# Patient Record
Sex: MALE | Race: WHITE | NOT HISPANIC OR LATINO | Employment: UNEMPLOYED | URBAN - METROPOLITAN AREA
[De-identification: names, ages, dates, MRNs, and addresses within clinical notes are randomized per-mention and may not be internally consistent; named-entity substitution may affect disease eponyms.]

---

## 2018-07-03 ENCOUNTER — OFFICE VISIT (OUTPATIENT)
Dept: FAMILY MEDICINE CLINIC | Facility: CLINIC | Age: 53
End: 2018-07-03
Payer: COMMERCIAL

## 2018-07-03 VITALS
BODY MASS INDEX: 24.14 KG/M2 | SYSTOLIC BLOOD PRESSURE: 138 MMHG | DIASTOLIC BLOOD PRESSURE: 80 MMHG | HEIGHT: 71 IN | WEIGHT: 172.4 LBS

## 2018-07-03 DIAGNOSIS — Z13.6 SCREENING FOR CARDIOVASCULAR CONDITION: ICD-10-CM

## 2018-07-03 DIAGNOSIS — Z23 NEED FOR VACCINATION: ICD-10-CM

## 2018-07-03 DIAGNOSIS — Z12.5 SCREENING FOR PROSTATE CANCER: ICD-10-CM

## 2018-07-03 DIAGNOSIS — Z13.0 SCREENING FOR DEFICIENCY ANEMIA: ICD-10-CM

## 2018-07-03 DIAGNOSIS — Z00.00 WELL ADULT EXAM: Primary | ICD-10-CM

## 2018-07-03 DIAGNOSIS — Z13.29 THYROID DISORDER SCREEN: ICD-10-CM

## 2018-07-03 DIAGNOSIS — Z13.1 SCREENING FOR DIABETES MELLITUS: ICD-10-CM

## 2018-07-03 DIAGNOSIS — Z12.11 SCREEN FOR COLON CANCER: ICD-10-CM

## 2018-07-03 PROCEDURE — 90715 TDAP VACCINE 7 YRS/> IM: CPT | Performed by: FAMILY MEDICINE

## 2018-07-03 PROCEDURE — 99386 PREV VISIT NEW AGE 40-64: CPT | Performed by: FAMILY MEDICINE

## 2018-07-03 PROCEDURE — 90471 IMMUNIZATION ADMIN: CPT | Performed by: FAMILY MEDICINE

## 2018-07-03 NOTE — PROGRESS NOTES
Assessment/Plan:    No problem-specific Assessment & Plan notes found for this encounter  There are no diagnoses linked to this encounter  Subjective:   Chief Complaint   Patient presents with    Physical Exam            Patient ID: Amber Lemus is a 48 y o  male  Patient is here as a new old patient for PE Patient has insurance now He has not been seen since 2006 Patient has history of hyperlipidemia Patient had at that time some chest pain and had stress testing done that was normal His last LDL was 195 ith HDL of 45 but that was in 2005 Patient is not no meds Patient ahs had not adacel in many years He works for an [employer]        The following portions of the patient's history were reviewed and updated as appropriate: allergies, current medications, past social history and problem list     Review of Systems   Constitutional: Negative for activity change, fever and unexpected weight change  HENT: Negative for congestion, ear discharge, ear pain, postnasal drip, rhinorrhea, sinus pain and sinus pressure  Eyes: Positive for visual disturbance  Negative for photophobia, pain, discharge, redness and itching  Respiratory: Negative for chest tightness, shortness of breath and wheezing  Cardiovascular: Negative for chest pain, palpitations and leg swelling  Gastrointestinal: Positive for blood in stool  Negative for abdominal pain, constipation, diarrhea, nausea, rectal pain and vomiting  Hemorrhoids   Genitourinary: Negative for difficulty urinating, dysuria, frequency, hematuria and testicular pain  Musculoskeletal: Positive for arthralgias  Skin: Negative for rash  Neurological: Negative for dizziness, tremors, light-headedness, numbness and headaches  Psychiatric/Behavioral: Negative for decreased concentration, dysphoric mood and sleep disturbance  The patient is not nervous/anxious            Objective:      /80   Ht 5' 11" (1 803 m)   Wt 78 2 kg (172 lb 6 4 oz) BMI 24 04 kg/m²          Physical Exam   Constitutional: He is oriented to person, place, and time  He appears well-developed and well-nourished  HENT:   Head: Normocephalic and atraumatic  Right Ear: Hearing, tympanic membrane and external ear normal    Left Ear: Hearing, tympanic membrane and external ear normal    Eyes: Conjunctivae and EOM are normal  Pupils are equal, round, and reactive to light  Neck: Normal range of motion  Neck supple  No thyromegaly present  Cardiovascular: Normal rate and normal heart sounds  Pulmonary/Chest: Effort normal and breath sounds normal  He has no wheezes  He has no rales  Abdominal: Soft  Bowel sounds are normal  He exhibits no distension  There is no tenderness  Musculoskeletal: He exhibits no edema or tenderness  Lymphadenopathy:     He has no cervical adenopathy  Neurological: He is alert and oriented to person, place, and time  No cranial nerve deficit  Coordination normal    Skin: Skin is warm and dry  No rash noted  Psychiatric: He has a normal mood and affect   His behavior is normal  Judgment and thought content normal

## 2018-07-03 NOTE — PATIENT INSTRUCTIONS
Wellness Visit for Adults   WHAT YOU NEED TO KNOW:   What is a wellness visit? A wellness visit is when you see your healthcare provider to get screened for health problems  You can also get advice on how to stay healthy  Write down your questions so you remember to ask them  Ask your healthcare provider how often you should have a wellness visit  What happens at a wellness visit? Your healthcare provider will ask about your health, and your family history of health problems  This includes high blood pressure, heart disease, and cancer  He or she will ask if you have symptoms that concern you, if you smoke, and about your mood  You may also be asked about your intake of medicines, supplements, food, and alcohol  Any of the following may be done:  · Your weight  will be checked  Your height may also be checked so your body mass index (BMI) can be calculated  Your BMI shows if you are at a healthy weight  · Your blood pressure  and heart rate will be checked  Your temperature may also be checked  · Blood and urine tests  may be done  Blood tests may be done to check your cholesterol levels  Abnormal cholesterol levels increase your risk for heart disease and stroke  You may also need a blood or urine test to check for diabetes if you are at increased risk  Urine tests may be done to look for signs of an infection or kidney disease  · A physical exam  includes checking your heartbeat and lungs with a stethoscope  Your healthcare provider may also check your skin to look for sun damage  · Screening tests  may be recommended  A screening test is done to check for diseases that may not cause symptoms  The screening tests you may need depend on your age, gender, family history, and lifestyle habits  For example, colorectal screening may be recommended if you are 48years old or older  What screening tests do I need if I am a woman? · A Pap smear  is used to screen for cervical cancer   Pap smears are usually done every 3 to 5 years depending on your age  You may need them more often if you have had abnormal Pap smear test results in the past  Ask your healthcare provider how often you should have a Pap smear  · A mammogram  is an x-ray of your breasts to screen for breast cancer  Experts recommend mammograms every 2 years starting at age 48 years  You may need a mammogram at age 52 years or younger if you have an increased risk for breast cancer  Talk to your healthcare provider about when you should start having mammograms and how often you need them  What vaccines might I need? · Get an influenza vaccine  every year  The influenza vaccine protects you from the flu  Several types of viruses cause the flu  The viruses change over time, so new vaccines are made each year  · Get a tetanus-diphtheria (Td) booster vaccine  every 10 years  This vaccine protects you against tetanus and diphtheria  Tetanus is a severe infection that may cause painful muscle spasms and lockjaw  Diphtheria is a severe bacterial infection that causes a thick covering in the back of your mouth and throat  · Get a human papillomavirus (HPV) vaccine  if you are female and aged 23 to 32 or male 23 to 24 and never received it  This vaccine protects you from HPV infection  HPV is the most common infection spread by sexual contact  HPV may also cause vaginal, penile, and anal cancers  · Get a pneumococcal vaccine  if you are aged 72 years or older  The pneumococcal vaccine is an injection given to protect you from pneumococcal disease  Pneumococcal disease is an infection caused by pneumococcal bacteria  The infection may cause pneumonia, meningitis, or an ear infection  · Get a shingles vaccine  if you are aged 61 or older, even if you have had shingles before  The shingles vaccine is an injection to protect you from the varicella-zoster virus  This is the same virus that causes chickenpox   Shingles is a painful rash that develops in people who had chickenpox or have been exposed to the virus  How can I eat healthy? My Plate is a model for planning healthy meals  It shows the types and amounts of foods that should go on your plate  Fruits and vegetables make up about half of your plate, and grains and protein make up the other half  A serving of dairy is included on the side of your plate  The amount of calories and serving sizes you need depends on your age, gender, weight, and height  Examples of healthy foods are listed below:  · Eat a variety of vegetables  such as dark green, red, and orange vegetables  You can also include canned vegetables low in sodium (salt) and frozen vegetables without added butter or sauces  · Eat a variety of fresh fruits , canned fruit in 100% juice, frozen fruit, and dried fruit  · Include whole grains  At least half of the grains you eat should be whole grains  Examples include whole-wheat bread, wheat pasta, brown rice, and whole-grain cereals such as oatmeal     · Eat a variety of protein foods such as seafood (fish and shellfish), lean meat, and poultry without skin (turkey and chicken)  Examples of lean meats include pork leg, shoulder, or tenderloin, and beef round, sirloin, tenderloin, and extra lean ground beef  Other protein foods include eggs and egg substitutes, beans, peas, soy products, nuts, and seeds  · Choose low-fat dairy products such as skim or 1% milk or low-fat yogurt, cheese, and cottage cheese  · Limit unhealthy fats  such as butter, hard margarine, and shortening  How much exercise do I need? Exercise at least 30 minutes per day on most days of the week  Some examples of exercise include walking, biking, dancing, and swimming  You can also fit in more physical activity by taking the stairs instead of the elevator or parking farther away from stores  Include muscle strengthening activities 2 days each week  Regular exercise provides many health benefits  It helps you manage your weight, and decreases your risk for type 2 diabetes, heart disease, stroke, and high blood pressure  Exercise can also help improve your mood  Ask your healthcare provider about the best exercise plan for you  What are some general health and safety guidelines I should follow? · Do not smoke  Nicotine and other chemicals in cigarettes and cigars can cause lung damage  Ask your healthcare provider for information if you currently smoke and need help to quit  E-cigarettes or smokeless tobacco still contain nicotine  Talk to your healthcare provider before you use these products  · Limit alcohol  A drink of alcohol is 12 ounces of beer, 5 ounces of wine, or 1½ ounces of liquor  · Lose weight, if needed  Being overweight increases your risk of certain health conditions  These include heart disease, high blood pressure, type 2 diabetes, and certain types of cancer  · Protect your skin  Do not sunbathe or use tanning beds  Use sunscreen with a SPF 15 or higher  Apply sunscreen at least 15 minutes before you go outside  Reapply sunscreen every 2 hours  Wear protective clothing, hats, and sunglasses when you are outside  · Drive safely  Always wear your seatbelt  Make sure everyone in your car wears a seatbelt  A seatbelt can save your life if you are in an accident  Do not use your cell phone when you are driving  This could distract you and cause an accident  Pull over if you need to make a call or send a text message  · Practice safe sex  Use latex condoms if are sexually active and have more than one partner  Your healthcare provider may recommend screening tests for sexually transmitted infections (STIs)  · Wear helmets, lifejackets, and protective gear  Always wear a helmet when you ride a bike or motorcycle, go skiing, or play sports that could cause a head injury  Wear protective equipment when you play sports   Wear a lifejacket when you are on a boat or doing water sports  CARE AGREEMENT:   You have the right to help plan your care  Learn about your health condition and how it may be treated  Discuss treatment options with your caregivers to decide what care you want to receive  You always have the right to refuse treatment  The above information is an  only  It is not intended as medical advice for individual conditions or treatments  Talk to your doctor, nurse or pharmacist before following any medical regimen to see if it is safe and effective for you  © 2017 [employer]  Information is for End User's use only and may not be sold, redistributed or otherwise used for commercial purposes  All illustrations and images included in CareNotes® are the copyrighted property of [employer]  or [employer]  Diphtheria/Acellular Pertussis/Tetanus Booster Vaccine (Tdap) (By injection)   Pertussis Vaccine, Acellular (per-TUS-iss VAX-een, d-QWAX-vsw-lar), Reduced Diphtheria Toxoid (ree-DOOST dif-THEER-ee-a TOX-oyd), Tetanus Toxoid (TET-a-nus TOX-oyd)  Protects against infections caused by tetanus (lockjaw), diphtheria, or pertussis (whooping cough)  This is a booster vaccine  Brand Name(s): Adacel, Boostrix   There may be other brand names for this medicine  When This Medicine Should Not Be Used: You should not receive this vaccine if you have had an allergic reaction to the separate or combined tetanus, diphtheria, or pertussis vaccine  You should not receive this vaccine if you have had seizures, mental changes, or any other serious reaction within 7 days after you received a pertussis vaccine  How to Use This Medicine:   Injectable  · A nurse or other health provider will give you this medicine  · Your doctor will prescribe your exact dose and tell you how often it should be given  This medicine is given as a shot into one of your muscles    · You may receive other vaccines at the same time as this one, but in a different body area  You should receive patient instructions for all of the vaccines  Talk to your doctor or nurse if you have questions  · Read and follow the patient instructions that come with this medicine  Talk to your doctor or pharmacist if you have any questions  Drugs and Foods to Avoid:   Ask your doctor or pharmacist before using any other medicine, including over-the-counter medicines, vitamins, and herbal products  · Make sure the doctor knows if you are receiving a treatment or medicine that weakens your immune system  This includes radiation treatment, steroid medicines (such as dexamethasone, hydrocortisone, methylprednisolone, prednisolone, prednisone, Medrol®), or cancer medicines  Warnings While Using This Medicine:   · Make sure your doctor knows if you are pregnant or breastfeeding or have epilepsy, a weak immune system, or a history of a stroke  Tell your doctor if you are sick or have a fever  · Tell your doctor about any reaction you had after you received a vaccine  This includes fainting, seizures, a fever over 105 degrees F, or severe redness or swelling where the shot was given  Tell your doctor if you have a history of Guillain-Barré syndrome after you received a vaccine with tetanus  · Call your doctor right away if you faint or have vision changes, numbness or tingling in your arms, hands, or feet, or a seizure after you receive this vaccine  · Tell your doctor if you have an allergy to latex  The syringes may contain dry natural latex rubber  · This vaccine will not treat an active infection  If you have a diphtheria, tetanus, or pertussis infection, you will need medicine to treat the infection    Possible Side Effects While Using This Medicine:   Call your doctor right away if you notice any of these side effects:  · Allergic reaction: Itching or hives, swelling in your face or hands, swelling or tingling in your mouth or throat, chest tightness, trouble breathing  · Changes in vision  · Fever over 105 degrees F  · Lightheadedness or fainting  · Numbness, tingling, or burning pain in your hands, arms, legs, or feet  · Seizures  · Sudden numbness or weakness in your arms or legs  · Severe pain, redness, or swelling where the shot was given  If you notice these less serious side effects, talk with your doctor:   · Headache  · Mild pain, redness, or swelling where the shot was given  · Nausea, vomiting, diarrhea, or stomach pain  · Tiredness  If you notice other side effects that you think are caused by this medicine, tell your doctor  Call your doctor for medical advice about side effects  You may report side effects to FDA at 4-334-FDA-5483  © 2017 [employer]  Information is for End User's use only and may not be sold, redistributed or otherwise used for commercial purposes  The above information is an  only  It is not intended as medical advice for individual conditions or treatments  Talk to your doctor, nurse or pharmacist before following any medical regimen to see if it is safe and effective for you

## 2018-07-15 LAB
BASOPHILS # BLD AUTO: 21 CELLS/UL (ref 0–200)
BASOPHILS NFR BLD AUTO: 0.4 %
CHOLEST SERPL-MCNC: 252 MG/DL
CHOLEST/HDLC SERPL: 5.3 (CALC)
EOSINOPHIL # BLD AUTO: 90 CELLS/UL (ref 15–500)
EOSINOPHIL NFR BLD AUTO: 1.7 %
ERYTHROCYTE [DISTWIDTH] IN BLOOD BY AUTOMATED COUNT: 13.6 % (ref 11–15)
HCT VFR BLD AUTO: 45.6 % (ref 38.5–50)
HDLC SERPL-MCNC: 48 MG/DL
HGB BLD-MCNC: 15.4 G/DL (ref 13.2–17.1)
LDLC SERPL CALC-MCNC: 177 MG/DL (CALC)
LYMPHOCYTES # BLD AUTO: 1171 CELLS/UL (ref 850–3900)
LYMPHOCYTES NFR BLD AUTO: 22.1 %
MCH RBC QN AUTO: 30.6 PG (ref 27–33)
MCHC RBC AUTO-ENTMCNC: 33.8 G/DL (ref 32–36)
MCV RBC AUTO: 90.5 FL (ref 80–100)
MONOCYTES # BLD AUTO: 578 CELLS/UL (ref 200–950)
MONOCYTES NFR BLD AUTO: 10.9 %
NEUTROPHILS # BLD AUTO: 3440 CELLS/UL (ref 1500–7800)
NEUTROPHILS NFR BLD AUTO: 64.9 %
NONHDLC SERPL-MCNC: 204 MG/DL (CALC)
PLATELET # BLD AUTO: 266 THOUSAND/UL (ref 140–400)
PMV BLD REES-ECKER: 10.6 FL (ref 7.5–12.5)
PSA SERPL-MCNC: 0.2 NG/ML
RBC # BLD AUTO: 5.04 MILLION/UL (ref 4.2–5.8)
TRIGL SERPL-MCNC: 135 MG/DL
TSH SERPL-ACNC: 1.24 MIU/L (ref 0.4–4.5)
WBC # BLD AUTO: 5.3 THOUSAND/UL (ref 3.8–10.8)

## 2018-07-17 ENCOUNTER — OFFICE VISIT (OUTPATIENT)
Dept: FAMILY MEDICINE CLINIC | Facility: CLINIC | Age: 53
End: 2018-07-17
Payer: COMMERCIAL

## 2018-07-17 VITALS
WEIGHT: 173 LBS | HEIGHT: 71 IN | DIASTOLIC BLOOD PRESSURE: 84 MMHG | BODY MASS INDEX: 24.22 KG/M2 | SYSTOLIC BLOOD PRESSURE: 138 MMHG

## 2018-07-17 DIAGNOSIS — E78.2 MIXED HYPERLIPIDEMIA: ICD-10-CM

## 2018-07-17 DIAGNOSIS — K42.9 UMBILICAL HERNIA WITHOUT OBSTRUCTION AND WITHOUT GANGRENE: Primary | ICD-10-CM

## 2018-07-17 PROCEDURE — 3008F BODY MASS INDEX DOCD: CPT | Performed by: FAMILY MEDICINE

## 2018-07-17 PROCEDURE — 99214 OFFICE O/P EST MOD 30 MIN: CPT | Performed by: FAMILY MEDICINE

## 2018-07-17 NOTE — PROGRESS NOTES
Assessment/Plan:    Mixed hyperlipidemia  Discussed with patient options for lipids Based on theASCVD plu calculator patient 10 yr risk is 7 3 % With treated lipids we lower his risk to 2 5% Discussed with patient if his risk was >10% I would tell him it is foolish not to try medication Patient was recommended to try omega 3 fatty acid and or fish oil Patient dietary changes recommended increase in fish such as tuna and salmon patient to have repeat labs in 6 months       Diagnoses and all orders for this visit:    Umbilical hernia without obstruction and without gangrene    Mixed hyperlipidemia          Subjective:   Chief Complaint   Patient presents with    Hernia          Patient ID: Sabrina Hollingsworth is a 48 y o  male  Patient is here for possible hernia in the umbilicus Patient states taht he noticed a lump there about 3 days ago It is the size of his index finger It is no painful 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 Patient is not really able to be off work for 4-6 weeks right now and wants to have it examined Patient labs were reveiweed with his today  with HDl 48 Patient is not willing to take statin medication He is asking about "natural things" he can do Patient has family histoyr of hyperlipidemia without any real CAD in the family Patient also lacerated his index finger and wanted me to look at that         The following portions of the patient's history were reviewed and updated as appropriate: allergies, current medications, past social history and problem list     Review of Systems   Constitutional: Negative for activity change, appetite change and unexpected weight change  Gastrointestinal: Negative for abdominal distention, abdominal pain, blood in stool, constipation, diarrhea, nausea and vomiting  Lump in the belly button area   Genitourinary: Negative for difficulty urinating  Skin: Positive for wound  Index finger         Objective:      /84   Ht 5' 11" (1 803 m)   Wt 78 5 kg (173 lb)   BMI 24 13 kg/m²          Physical Exam   Constitutional: He appears well-developed and well-nourished  Abdominal: Soft  Bowel sounds are normal  He exhibits no distension  There is no tenderness  There is no rebound and no guarding  1 5 cm umbilical hernia visibel in the sitting position Patient has no pain in the area   Neurological: He is alert  Skin:   Healing wound ont he right index finger   Psychiatric: He has a normal mood and affect   His behavior is normal  Judgment and thought content normal

## 2018-07-17 NOTE — ASSESSMENT & PLAN NOTE
Discussed with patient options for lipids Based on theASCVD plu calculator patient 10 yr risk is 7 3 % With treated lipids we lower his risk to 2 5% Discussed with patient if his risk was >10% I would tell him it is foolish not to try medication Patient was recommended to try omega 3 fatty acid and or fish oil Patient dietary changes recommended increase in fish such as tuna and salmon patient to have repeat labs in 6 months

## 2018-07-17 NOTE — PATIENT INSTRUCTIONS
Umbilical Hernia   WHAT YOU NEED TO KNOW:   What is an umbilical hernia? An umbilical hernia is a bulge through the abdominal muscles in the area of the navel (belly button)  The hernia may contain tissue from the abdomen, part of an organ (such as the intestine), or fluid  What increases my risk for an umbilical hernia? Umbilical hernias usually happen because of a hole or a weak area in the muscles of the abdominal wall  This can happen at any age  Umbilical hernias happen more often in women than in men  You may be more likely to have a hernia if other family members have them  You may also have an increased risk if you have any of the following:  · Overweight     · Ascites, which is fluid in the abdomen     · A large growth in your abdomen     · Pregnancy now or at any time in the past     · A very long labor when delivering your baby  What are the signs and symptoms of an umbilical hernia? · The most common sign is a bulge or swelling in the area of the navel  You may be able to see the lump, or you may feel it when you gently press on your navel  · The bulge may get bigger when you bend, cough, or strain to have a bowel movement  The bulge may get smaller and hurt less when you lie down  · You may have pain or burning in your abdomen  The pain may get worse when you cough, sneeze, lift, or stand for a long time  · The skin over the bulge may be swollen and red, gray, or blue  How is an umbilical hernia diagnosed? Your healthcare provider will usually find the hernia during an exam  Your healthcare provider may check to see if the hernia can be reduced (gently pushed back into the abdomen)  You may need tests, such as x-rays of the abdomen or an ultrasound  These tests will help healthcare providers decide how to treat your hernia  How is an umbilical hernia treated? · Surgery:  Most adults with umbilical hernias will need surgery to fix their hernias   Always tell your healthcare provider if you have new or worse pain in the area of your hernia  You may need emergency surgery if a loop of intestine becomes trapped in the hernia  · Ibuprofen or acetaminophen:  These medicines decrease pain  They are available without a doctor's order  Ask your healthcare provider which medicine is right for you  Ask how much to take and how often to take it  Follow directions  These medicines can cause stomach bleeding if not taken correctly  Ibuprofen can cause kidney damage  Do not take ibuprofen if you have kidney disease, an ulcer, or allergies to aspirin  Acetaminophen can cause liver damage  Do not drink alcohol if you take acetaminophen  What are the risks of an umbilical hernia? If your hernia is not treated, the following serious problems may happen:  · Incarcerated hernia: This happens when your healthcare provider cannot push your hernia back into your abdomen  The tissue becomes stuck or trapped, which can cause serious problems  Umbilical hernias have a high risk of becoming incarcerated  If this happens, your intestines may become blocked  You may have very bad pain in your abdomen  You may also have nausea or vomiting  · Strangulated hernia:  A loop of intestine in the hernia may become pinched or strangulated  This means that the blood supply to that area of intestine is decreased or cut off  If this happens, you may feel very bad pain in your abdomen  Other signs include nausea, vomiting, or a fever  You may have constipation or blood in your bowel movements  If your intestine becomes blocked, you may not be able to pass gas or have a bowel movement  If the hernia is not treated right away, that part of your intestine may die  This is called gangrene, and it can be life-threatening  When should I contact my healthcare provider? · You have nausea or vomiting  · You cannot gently push your hernia back into your abdomen      · You are constipated or have blood in your bowel movements  · Your hernia is getting bigger  · You have questions or concerns about your condition or care  When should I seek immediate care or call 911? · You have a fever  · Your hernia is stuck outside the abdomen and is painful, swollen, or feels hard  · You completely stop having bowel movements and stop passing gas  · Your abdominal pain is bad or getting worse  CARE AGREEMENT:   You have the right to help plan your care  Learn about your health condition and how it may be treated  Discuss treatment options with your caregivers to decide what care you want to receive  You always have the right to refuse treatment  The above information is an  only  It is not intended as medical advice for individual conditions or treatments  Talk to your doctor, nurse or pharmacist before following any medical regimen to see if it is safe and effective for you  © 2017 [employer] Information is for End User's use only and may not be sold, redistributed or otherwise used for commercial purposes  All illustrations and images included in CareNotes® are the copyrighted property of [employer] M [employer]  or [employer]  Cholesterol and Your Health   WHAT YOU NEED TO KNOW:   What is cholesterol? Cholesterol is a waxy, fat-like substance  Cholesterol is made by your body, but also comes from certain foods you eat  Your body uses cholesterol to make hormones and new cells  Your body also uses cholesterol to protect nerves  Cholesterol comes from foods such as meat and dairy products  Your total cholesterol level is made up by LDL cholesterol, HDL cholesterol, and triglycerides:  · LDL cholesterol  is called bad cholesterol  because it forms plaque in your arteries  As plaque builds up, your arteries become narrow, and less blood flows through  When plaque decreases blood flow to your heart, you may have chest pain   If plaque completely blocks an artery that bring blood to your heart, you may have a heart attack  Plaque can break off and form blood clots  Blood clots may block arteries in your brain and cause a stroke  · HDL cholesterol  is called good cholesterol  because it helps remove LDL cholesterol from your arteries  It does this by attaching to LDL cholesterol and carrying it to your liver  Your liver breaks down LDL cholesterol so your body can get rid of it  High levels of HDL cholesterol can help prevent a heart attack and stroke  Low levels of HDL cholesterol can increase your risk for heart disease, heart attack, and stroke  · Triglycerides  are a type of fat that store energy from foods you eat  High levels of triglycerides also cause plaque buildup  This can increase your risk for a heart attack or stroke  If your triglyceride level is high, your LDL cholesterol level may also be high  How does food affect my cholesterol levels? · Unhealthy fats  increase LDL cholesterol and triglyceride levels in your blood  They are found in foods high in cholesterol, saturated fat, and trans fat:     ¨ Cholesterol  is found in eggs, dairy, and meat  ¨ Saturated fat  is found in butter, cheese, ice cream, whole milk, and coconut oil  Saturated fat is also found in meat, such as sausage, hot dogs, and bologna  ¨ Trans fat  is found in liquid oils and is used in fried and baked foods  Foods that contain trans fats include chips, crackers, muffins, sweet rolls, microwave popcorn, and cookies  · Healthy fats,  also called unsaturated fats, help lower LDL cholesterol and triglyceride levels  Healthy fats include the following:     ¨ Monounsaturated fats  are found in foods such as olive oil, canola oil, avocado, nuts, and olives  ¨ Polyunsaturated fats,  such as omega 3 fats, are found in fish, such as salmon, trout, and tuna  They can also be found in plant foods such as flaxseed, walnuts, and soybeans  What other things affect my cholesterol levels?    · Smoking cigarettes    · Being overweight or obese     · Drinking large amounts of alcohol    · Not enough exercise or no exercise    · Certain genes passed from your parents to you  What do I need to know about having my cholesterol checked? Adults 21to 39years of age should have their cholesterol levels checked every 4 to 6 years  Adults 45 years and older should have their cholesterol checked every 1 to 2 years  You may need your cholesterol checked more often, or at a younger age, if you have risk factors for heart disease  You may also need to have your cholesterol checked more often if you have other health conditions, such as diabetes  Blood tests are used to check cholesterol levels  Blood tests measure your levels of triglycerides, LDL cholesterol, and HDL cholesterol  What should my cholesterol level be? Your cholesterol level goal may depend on your risk for heart disease  It may also depend on your age and other health conditions  Ask your healthcare provider if the following goals are right for you:  · Your total cholesterol level  should be less than 200 mg/dL  This number may also depend on your HDL and LDL cholesterol goals  · Your LDL cholesterol level  should be less than 130 mg/dL  · Your HDL cholesterol level  should be 60 mg/dL or higher  · Your triglyceride level  should be less than 150 mg/dL  How is high cholesterol treated? Treatment for high cholesterol will also decrease your risk of heart disease, heart attack, and stroke  Treatment may include any of the following:  · Medicines  may be given to lower your LDL cholesterol, triglyceride levels, or total cholesterol level   You may need medicines to lower your cholesterol if any of the following is true:     ¨ You have a history of stroke, TIA, unstable angina, or a heart attack    ¨ Your LDL cholesterol level is 190 mg/dL or higher    ¨ You are age 36to 76years of age, have diabetes, and your LDL cholesterol is 70 mg/dL or higher    ¨ You are age 36to 76years of age, have risk factors for heart disease, and your LDL cholesterol is 70 mg/dL or higher    · Lifestyle changes  include changes to your diet, exercise, weight loss, and quitting smoking  It also includes decreasing the amount of alcohol you drink  · Supplements  include fish oil, red yeast rice, and garlic  Fish oil may help lower your triglyceride and LDL cholesterol levels  It may also increase your HDL cholesterol level  Red yeast rice may help decrease your total cholesterol level and LDL cholesterol level  Garlic may help lower your total cholesterol level  Do not take these supplements without talking to your healthcare provider  What changes can I make to the foods I eat to lower my cholesterol levels? A registered dietitian can help you create a healthy eating plan  Read food labels and choose foods low in saturated fat, trans fats, and cholesterol  · Decrease the total amount of fat you eat  Choose lean meats, fat-free or 1% fat milk, and low-fat dairy products, such as yogurt and cheese  Try to limit or avoid red meats  Limit or do not eat fried foods or baked goods such as cookies  · Replace unhealthy fats with healthy fats  Cook foods in olive oil or canola oil  Choose soft margarines that are low in saturated fat and trans fat  Seeds, nuts, and avocados are other examples of healthy fats  · Eat foods with omega-3 fats  Examples include salmon, tuna, mackerel, walnuts, and flaxseed  Eat fish 2 times per week  Children and pregnant women should not eat fish that have high levels of mercury, such as shark, swordfish, and daphne mackerel  · Increase the amount of plant-based foods you eat  Plant-based foods are low in cholesterol and fat  Eating more of these foods may help lower your cholesterol and help you lose weight  Examples of plant-based foods includes fruits, vegetables, legumes, and whole grains   Replace milk that contains dairy with almond, soy, or coconut milk  Eat beans and foods with soy for protein instead of meat  Ask your healthcare provider or dietitian for more information on plant-based foods  · Increase the amount of fiber you eat  High-fiber foods can help lower your LDL cholesterol  You should eat between 20 and 30 grams of fiber each day  Eat at least 5 servings of fruits and vegetables each day  Other examples of high-fiber foods include whole-grain or whole-wheat breads, pastas, or cereals, and brown rice  Eat 3 ounces of whole-grain foods each day  Increase fiber slowly  You may have abdominal discomfort, bloating, and gas if you add fiber to your diet too quickly  What lifestyle changes can I make to lower my cholesterol levels? · Maintain a healthy weight  Ask your healthcare provider how much you should weigh  Ask him or her to help you create a weight loss plan if you are overweight  Weight loss can decrease your total cholesterol and triglyceride levels  · Exercise regularly  Exercise can help lower your total cholesterol level and maintain a healthy weight  Exercise can also help increase your HDL cholesterol level  Work with your healthcare provider to create an exercise program that is right for you  Get at least 30 minutes of moderate exercise most days of the week  Examples of exercise include brisk walking, swimming, or biking  · Do not smoke  Nicotine and other chemicals in cigarettes and cigars can damage your lungs, heart, and blood vessels  They can also raise your triglyceride levels  Ask your healthcare provider for information if you currently smoke and need help to quit  E-cigarettes or smokeless tobacco still contain nicotine  Talk to your healthcare provider before you use these products  · Limit or do not drink alcohol  Alcohol can increase your triglyceride levels  Ask your healthcare provider if it is safe for you to drink alcohol   Also ask how much is safe for you to drink each day   CARE AGREEMENT:   You have the right to help plan your care  Discuss treatment options with your caregivers to decide what care you want to receive  You always have the right to refuse treatment  The above information is an  only  It is not intended as medical advice for individual conditions or treatments  Talk to your doctor, nurse or pharmacist before following any medical regimen to see if it is safe and effective for you  © 2017 [employer]  Information is for End User's use only and may not be sold, redistributed or otherwise used for commercial purposes  All illustrations and images included in CareNotes® are the copyrighted property of [employer] A M , Inc  or [employer]

## 2018-08-31 ENCOUNTER — TELEPHONE (OUTPATIENT)
Dept: GASTROENTEROLOGY | Facility: MEDICAL CENTER | Age: 53
End: 2018-08-31

## 2018-08-31 ENCOUNTER — OFFICE VISIT (OUTPATIENT)
Dept: GASTROENTEROLOGY | Facility: MEDICAL CENTER | Age: 53
End: 2018-08-31
Payer: COMMERCIAL

## 2018-08-31 VITALS
SYSTOLIC BLOOD PRESSURE: 124 MMHG | BODY MASS INDEX: 23.88 KG/M2 | WEIGHT: 171.2 LBS | TEMPERATURE: 98.7 F | DIASTOLIC BLOOD PRESSURE: 82 MMHG | HEART RATE: 75 BPM

## 2018-08-31 DIAGNOSIS — K64.1 GRADE II HEMORRHOIDS: ICD-10-CM

## 2018-08-31 DIAGNOSIS — Z12.11 SCREEN FOR COLON CANCER: Primary | ICD-10-CM

## 2018-08-31 DIAGNOSIS — R10.13 DYSPEPSIA: ICD-10-CM

## 2018-08-31 PROCEDURE — 99244 OFF/OP CNSLTJ NEW/EST MOD 40: CPT | Performed by: INTERNAL MEDICINE

## 2018-08-31 NOTE — LETTER
August 31, 2018     Bernardo Sears DO  [address]  [id number]    Patient: Debra Mercado   YOB: 1965   Date of Visit: 8/31/2018       Dear Dr Mauricio Shaw:    Thank you for referring Luis Armando Lawson to me for evaluation  Below are my notes for this consultation  If you have questions, please do not hesitate to call me  I look forward to following your patient along with you  Sincerely,        Dinesh Rivas MD        CC: No Recipients  Dinesh Rivas MD  8/31/2018  8:30 AM  Sign at close encounter  [facility] - Outpatient Consultation  Debra Mercado 48 y o  male MRN: [id number]  Encounter: [id number]          ASSESSMENT AND PLAN:      1  Screen for colon cancer- this will be his index colonoscopy  He is average risk for colorectal cancer  - polyethylene glycol (GOLYTELY) 4000 mL solution; Take 4,000 mL by mouth once for 1 dose  Dispense: 4000 mL; Refill: 0  - Case request operating room: COLONOSCOPY    2  Grade II hemorrhoids  3  Dyspepsia  - discuss conservative management and further evaluation during colonoscopy  This would include increasing fiber, fluids  We also discuss starting probiotics due to his symptoms of dyspepsia  We also did stated improving of constipation which is mild may improve overall symptoms of dyspepsia as well   - H  pylori antigen, stool; Future  - Celiac Disease Antibody Profile; Future    ______________________________________________________________________    HPI:      He is a 80-year-old male presents here for his index colonoscopy  He does have history of hemorrhoids and has intermittent bleeding which is hemorrhoidal in nature  No prior colonoscopy  He previously had hemorrhoid surgery 20 years ago now hemorrhoids her more bothersome  Otherwise doing well without any acute issues  Hemoglobin is within normal limits  Review of system he does have history of dyspepsia associated bloating  He does he has lot of breads will rule not to make sure he does not have celiac disease  We also discussed increasing fiber as he is mildly constipated [place] stool chart type 2  REVIEW OF SYSTEMS:    CONSTITUTIONAL: Denies any fever, chills, rigors, and weight loss  HEENT: No earache or tinnitus  Denies hearing loss or visual disturbances  CARDIOVASCULAR: No chest pain or palpitations  RESPIRATORY: Denies any cough, hemoptysis, shortness of breath or dyspnea on exertion  GASTROINTESTINAL: As noted in the History of Present Illness  GENITOURINARY: No problems with urination  Denies any hematuria or dysuria  NEUROLOGIC: No dizziness or vertigo, denies headaches  MUSCULOSKELETAL: Denies any muscle or joint pain  SKIN: Denies skin rashes or itching  ENDOCRINE: Denies excessive thirst  Denies intolerance to heat or cold  PSYCHOSOCIAL: Denies depression or anxiety  Denies any recent memory loss  Historical Information   Past Medical History:   Diagnosis Date    Hyperlipidemia      History reviewed  No pertinent surgical history  Social History   History   Alcohol Use    Yes     Comment: occas      History   Drug Use No     History   Smoking Status    Never Smoker   Smokeless Tobacco    Never Used     Family History   Problem Relation Age of Onset    Hypertension Mother     Hyperlipidemia Mother     Hyperlipidemia Father     No Known Problems Brother        Meds/Allergies       Current Outpatient Prescriptions:     polyethylene glycol (GOLYTELY) 4000 mL solution    No Known Allergies        Objective     Blood pressure 124/82, pulse 75, temperature 98 7 °F (37 1 °C), temperature source Tympanic, weight 77 7 kg (171 lb 3 2 oz)  Body mass index is 23 88 kg/m²          PHYSICAL EXAM:      General Appearance:   Alert, cooperative, no distress   HEENT:   Normocephalic, atraumatic, anicteric      Neck:  Supple, symmetrical, trachea midline   Lungs:   Clear to auscultation bilaterally; no rales, rhonchi or wheezing; respirations unlabored    Heart[de-identified]   Regular rate and rhythm; no murmur, rub, or gallop  Abdomen:   Soft, non-tender, non-distended; normal bowel sounds; no masses, no organomegaly    Genitalia:   Deferred    Rectal:   Deferred    Extremities:  No cyanosis, clubbing or edema    Pulses:  2+ and symmetric    Skin:  No jaundice, rashes, or lesions    Lymph nodes:  No palpable cervical lymphadenopathy        Lab Results:   No visits with results within 1 Day(s) from this visit  Latest known visit with results is:   Orders Only on 07/14/2018   Component Date Value    Total Cholesterol 07/14/2018 252*    HDL 07/14/2018 48     Triglycerides 07/14/2018 135     LDL Direct 07/14/2018 177*    Chol HDLC Ratio 07/14/2018 5 3*    Non-HDL Cholesterol 07/14/2018 204*    White Blood Cell Count 07/14/2018 5 3     Red Blood Cell Count 07/14/2018 5 04     Hemoglobin 07/14/2018 15 4     HCT 07/14/2018 45 6     MCV 07/14/2018 90 5     MCH 07/14/2018 30 6     MCHC 07/14/2018 33 8     RDW 07/14/2018 13 6     Platelet Count 69/80/1539 266     SL AMB MPV 07/14/2018 10 6     Neutrophils (Absolute) 07/14/2018 3440     Lymphocytes (Absolute) 07/14/2018 1171     Monocytes (Absolute) 07/14/2018 578     Eosinophils (Absolute) 07/14/2018 90     Basophils (Absolute) 07/14/2018 21     Neutrophils 07/14/2018 64 9     Lymphocytes 07/14/2018 22 1     Monocytes 07/14/2018 10 9     Eosinophils 07/14/2018 1 7     Basophils Relative 07/14/2018 0 4     Prostate Specific Antige* 07/14/2018 0 2     SL AMB TSH W/ REFLEX TO * 07/14/2018 1 24          Radiology Results:   No results found

## 2018-08-31 NOTE — PROGRESS NOTES
[facility] - Outpatient Consultation  Dandre Conn 48 y o  male MRN: [id number]  Encounter: [id number]          ASSESSMENT AND PLAN:      1  Screen for colon cancer- this will be his index colonoscopy  He is average risk for colorectal cancer  - polyethylene glycol (GOLYTELY) 4000 mL solution; Take 4,000 mL by mouth once for 1 dose  Dispense: 4000 mL; Refill: 0  - Case request operating room: COLONOSCOPY    2  Grade II hemorrhoids  3  Dyspepsia  - discuss conservative management and further evaluation during colonoscopy  This would include increasing fiber, fluids  We also discuss starting probiotics due to his symptoms of dyspepsia  We also did stated improving of constipation which is mild may improve overall symptoms of dyspepsia as well   - H  pylori antigen, stool; Future  - Celiac Disease Antibody Profile; Future    ______________________________________________________________________    HPI:      He is a 71-year-old male presents here for his index colonoscopy  He does have history of hemorrhoids and has intermittent bleeding which is hemorrhoidal in nature  No prior colonoscopy  He previously had hemorrhoid surgery 20 years ago now hemorrhoids her more bothersome  Otherwise doing well without any acute issues  Hemoglobin is within normal limits  Review of system he does have history of dyspepsia associated bloating  He does he has lot of breads will rule not to make sure he does not have celiac disease  We also discussed increasing fiber as he is mildly constipated Banner stool chart type 2  REVIEW OF SYSTEMS:    CONSTITUTIONAL: Denies any fever, chills, rigors, and weight loss  HEENT: No earache or tinnitus  Denies hearing loss or visual disturbances  CARDIOVASCULAR: No chest pain or palpitations  RESPIRATORY: Denies any cough, hemoptysis, shortness of breath or dyspnea on exertion  GASTROINTESTINAL: As noted in the History of Present Illness  GENITOURINARY: No problems with urination  Denies any hematuria or dysuria  NEUROLOGIC: No dizziness or vertigo, denies headaches  MUSCULOSKELETAL: Denies any muscle or joint pain  SKIN: Denies skin rashes or itching  ENDOCRINE: Denies excessive thirst  Denies intolerance to heat or cold  PSYCHOSOCIAL: Denies depression or anxiety  Denies any recent memory loss  Historical Information   Past Medical History:   Diagnosis Date    Hyperlipidemia      History reviewed  No pertinent surgical history  Social History   History   Alcohol Use    Yes     Comment: occas      History   Drug Use No     History   Smoking Status    Never Smoker   Smokeless Tobacco    Never Used     Family History   Problem Relation Age of Onset    Hypertension Mother     Hyperlipidemia Mother     Hyperlipidemia Father     No Known Problems Brother        Meds/Allergies       Current Outpatient Prescriptions:     polyethylene glycol (GOLYTELY) 4000 mL solution    No Known Allergies        Objective     Blood pressure 124/82, pulse 75, temperature 98 7 °F (37 1 °C), temperature source Tympanic, weight 77 7 kg (171 lb 3 2 oz)  Body mass index is 23 88 kg/m²  PHYSICAL EXAM:      General Appearance:   Alert, cooperative, no distress   HEENT:   Normocephalic, atraumatic, anicteric      Neck:  Supple, symmetrical, trachea midline   Lungs:   Clear to auscultation bilaterally; no rales, rhonchi or wheezing; respirations unlabored    Heart[de-identified]   Regular rate and rhythm; no murmur, rub, or gallop  Abdomen:   Soft, non-tender, non-distended; normal bowel sounds; no masses, no organomegaly    Genitalia:   Deferred    Rectal:   Deferred    Extremities:  No cyanosis, clubbing or edema    Pulses:  2+ and symmetric    Skin:  No jaundice, rashes, or lesions    Lymph nodes:  No palpable cervical lymphadenopathy        Lab Results:   No visits with results within 1 Day(s) from this visit     Latest known visit with results is:   Orders Only on 07/14/2018   Component Date Value    Total Cholesterol 07/14/2018 252*    HDL 07/14/2018 48     Triglycerides 07/14/2018 135     LDL Direct 07/14/2018 177*    Chol HDLC Ratio 07/14/2018 5 3*    Non-HDL Cholesterol 07/14/2018 204*    White Blood Cell Count 07/14/2018 5 3     Red Blood Cell Count 07/14/2018 5 04     Hemoglobin 07/14/2018 15 4     HCT 07/14/2018 45 6     MCV 07/14/2018 90 5     MCH 07/14/2018 30 6     MCHC 07/14/2018 33 8     RDW 07/14/2018 13 6     Platelet Count 10/27/0535 266     SL AMB MPV 07/14/2018 10 6     Neutrophils (Absolute) 07/14/2018 3440     Lymphocytes (Absolute) 07/14/2018 1171     Monocytes (Absolute) 07/14/2018 578     Eosinophils (Absolute) 07/14/2018 90     Basophils (Absolute) 07/14/2018 21     Neutrophils 07/14/2018 64 9     Lymphocytes 07/14/2018 22 1     Monocytes 07/14/2018 10 9     Eosinophils 07/14/2018 1 7     Basophils Relative 07/14/2018 0 4     Prostate Specific Antige* 07/14/2018 0 2     SL AMB TSH W/ REFLEX TO * 07/14/2018 1 24          Radiology Results:   No results found

## 2018-08-31 NOTE — PATIENT INSTRUCTIONS
The patient was scheduled at [address] for a colonoscopy with Dr Nathaniel Swain/Randy prep was gone over with by the MA  He is aware he will be called the day prior

## 2018-08-31 NOTE — TELEPHONE ENCOUNTER
08/31/18  Screened by: Ngoc Anderson    Referring Provider Dr CAPELLAN [facility]    Pre- Screening: There is no height or weight on file to calculate BMI  Has patient been referred for a routine screening Colonoscopy? Yes  Is the patient between 39-70 years old? Yes    SCHEDULING STAFF   If the patient is between 45yrs-49yrs, please advise patient to confirm benefits/coverage with their insurance company for a routine screening colonoscopy, some insurance carriers will only cover at [phone] or older   If the patient is over 66years old, please schedule an office visit  Do you have any of the following symptoms? Yes    Have you had a coronary or vascular stent within the last year? No    Have you had a heart attack or stroke in the last 6 months? No    Have you had intestinal surgery in the last 3 months? No    Do you have problems with: No    Do you use: No    Have you been hospitalized in the last Month? NO    Have you been diagnosed with a bleeding disorder or anemia? No    Have you had chest pain (angina) or breathing problems  (COPD) in the last 3 months? No    Do you have any difficulty walking up a flight of stairs? No    Have you had Kidney failure or insufficiency? No    Have you had heart valve surgery? No    Are you confined to a wheelchair? No    Do you take any blood thinners? No    Have you been diagnosed with Diabetes or are you taking any   Diabetic medications? No    : If patient answers NO to medical questions, then schedule procedure  If patient answers YES to medical questions, then schedule office appointment  Previous Colonoscopy No  Date and Facility of last colonoscopy?      Comments:

## 2018-09-06 ENCOUNTER — TELEPHONE (OUTPATIENT)
Dept: GASTROENTEROLOGY | Facility: MEDICAL CENTER | Age: 53
End: 2018-09-06

## 2018-09-06 NOTE — TELEPHONE ENCOUNTER
----- Message from Reyes De Leon MD sent at 9/6/2018  2:36 PM EDT -----  Call patient to report normal results

## 2018-10-01 ENCOUNTER — ANESTHESIA EVENT (OUTPATIENT)
Dept: GASTROENTEROLOGY | Facility: MEDICAL CENTER | Age: 53
End: 2018-10-01
Payer: COMMERCIAL

## 2018-10-02 ENCOUNTER — HOSPITAL ENCOUNTER (OUTPATIENT)
Facility: MEDICAL CENTER | Age: 53
Setting detail: OUTPATIENT SURGERY
Discharge: HOME | End: 2018-10-02
Attending: INTERNAL MEDICINE | Admitting: INTERNAL MEDICINE
Payer: COMMERCIAL

## 2018-10-02 ENCOUNTER — ANESTHESIA (OUTPATIENT)
Dept: GASTROENTEROLOGY | Facility: MEDICAL CENTER | Age: 53
End: 2018-10-02
Payer: COMMERCIAL

## 2018-10-02 VITALS
TEMPERATURE: 98.2 F | OXYGEN SATURATION: 94 % | HEIGHT: 71 IN | WEIGHT: 165 LBS | RESPIRATION RATE: 17 BRPM | HEART RATE: 46 BPM | SYSTOLIC BLOOD PRESSURE: 117 MMHG | BODY MASS INDEX: 23.1 KG/M2 | DIASTOLIC BLOOD PRESSURE: 78 MMHG

## 2018-10-02 DIAGNOSIS — Z12.11 SCREEN FOR COLON CANCER: ICD-10-CM

## 2018-10-02 PROCEDURE — 88305 TISSUE EXAM BY PATHOLOGIST: CPT | Performed by: PATHOLOGY

## 2018-10-02 PROCEDURE — 45385 COLONOSCOPY W/LESION REMOVAL: CPT | Performed by: INTERNAL MEDICINE

## 2018-10-02 PROCEDURE — 45380 COLONOSCOPY AND BIOPSY: CPT | Performed by: INTERNAL MEDICINE

## 2018-10-02 RX ORDER — PROPOFOL 10 MG/ML
INJECTION, EMULSION INTRAVENOUS AS NEEDED
Status: DISCONTINUED | OUTPATIENT
Start: 2018-10-02 | End: 2018-10-02 | Stop reason: SURG

## 2018-10-02 RX ORDER — SODIUM CHLORIDE 9 MG/ML
125 INJECTION, SOLUTION INTRAVENOUS CONTINUOUS
Status: DISCONTINUED | OUTPATIENT
Start: 2018-10-02 | End: 2018-10-02 | Stop reason: HOSPADM

## 2018-10-02 RX ADMIN — PROPOFOL 50 MG: 10 INJECTION, EMULSION INTRAVENOUS at 08:23

## 2018-10-02 RX ADMIN — SODIUM CHLORIDE 125 ML/HR: 0.9 INJECTION, SOLUTION INTRAVENOUS at 08:01

## 2018-10-02 RX ADMIN — PROPOFOL 50 MG: 10 INJECTION, EMULSION INTRAVENOUS at 08:33

## 2018-10-02 RX ADMIN — PROPOFOL 50 MG: 10 INJECTION, EMULSION INTRAVENOUS at 08:20

## 2018-10-02 RX ADMIN — PROPOFOL 100 MG: 10 INJECTION, EMULSION INTRAVENOUS at 08:17

## 2018-10-02 RX ADMIN — PROPOFOL 50 MG: 10 INJECTION, EMULSION INTRAVENOUS at 08:38

## 2018-10-02 RX ADMIN — PROPOFOL 50 MG: 10 INJECTION, EMULSION INTRAVENOUS at 08:26

## 2018-10-02 RX ADMIN — PROPOFOL 50 MG: 10 INJECTION, EMULSION INTRAVENOUS at 08:29

## 2018-10-02 NOTE — OP NOTE
OPERATIVE REPORT  PATIENT NAME: Domo Johnson    :  1965  MRN: [id number]  Pt Location: [facility] GI ROOM 01    SURGERY DATE: 10/2/2018    Surgeon(s) and Role:     * Chary Sampson MD - Primary    Preop Diagnosis:  Screen for colon cancer [Z12 11]    Post-Op Diagnosis Codes:     * Screen for colon cancer [Z12 11]    Procedure(s) (LRB):  COLONOSCOPY (N/A)    Specimen(s):  ID Type Source Tests Collected by Time Destination   1 : cecal polyp- coldt snarex1 forcep x1 Tissue Polyp, Colorectal TISSUE EXAM Chary Sampson MD 10/2/2018 2835    2 : sigmoid polyp- hot snare Tissue Polyp, Colorectal TISSUE Savanna Herrmann MD 10/2/2018 1505    3 : sigmoid polyp- hot snare Tissue Polyp, Colorectal TISSUE EXAM Chary Sampson MD 10/2/2018 0840      Colonoscopy Procedure Note    Procedure: Colonoscopy    Sedation: Monitored anesthesia care, check anesthesia records      ASA Class: 2    INDICATIONS:  Screening colonoscopy    POST-OP DIAGNOSIS: See the impression below    Procedure Details     Prior colonoscopy: No prior colonoscopy  Informed consent was obtained for the procedure, including sedation  Risks of perforation, hemorrhage, adverse drug reaction and aspiration were discussed  The patient was placed in the left lateral decubitus position  Based on the pre-procedure assessment, including review of the patient's medical history, medications, allergies, and review of systems, he had been deemed to be an appropriate candidate for conscious sedation; he was therefore sedated with the medications listed below  The patient was monitored continuously with telemetry, pulse oximetry, blood pressure monitoring, and direct observations  A rectal examination was performed  The colonoscope was inserted into the rectum and advanced under direct vision to the cecum, which was identified by the ileocecal valve and appendiceal orifice  The quality of the colonic preparation was excellent    A careful inspection was made as the colonoscope was withdrawn, including a retroflexed view of the rectum; findings and interventions are described below  Findings:  10 mm colonic polyp seen in the cecum by cold snare polypectomy  4 mm colonic polyp seen the cecum by cold biopsy polypectomy  30 mm colonic polyp seen in the sigmoid removed by snare cautery polypectomy  3 clips were placed at the site of polypectomy  20 mm polyp seen in the sigmoid colon removed by snare cautery polypectomy  2 clips were placed at the site of polypectomy           Complications: None; patient tolerated the procedure well  Impression:    Multiple colonic polyps removed  Follow-up biopsy results  Close monitor for post polypectomy    Recommendations:  Repeat colonoscopy in 3 years if polyp is an adenoma        SIGNATURE: Carole Giordano MD  DATE: October 2, 2018  TIME: 8:49 AM

## 2018-10-02 NOTE — DISCHARGE INSTRUCTIONS
Colorectal Polyps   WHAT YOU NEED TO KNOW:   Colorectal polyps are small growths of tissue in the lining of the colon and rectum  Most polyps are hyperplastic polyps and are usually benign (noncancerous)  Certain types of polyps, called adenomatous polyps, may turn into cancer  DISCHARGE INSTRUCTIONS:   Follow up with your healthcare provider or gastroenterologist as directed: You may need to return for more tests, such as another colonoscopy  Write down your questions so you remember to ask them during your visits  Reduce your risk for colorectal polyps:   · Eat a variety of healthy foods:  Healthy foods include fruit, vegetables, whole-grain breads, low-fat dairy products, beans, lean meat, and fish  Ask if you need to be on a special diet  · Maintain a healthy weight:  Ask your healthcare provider if you need to lose weight and how much you need to lose  Ask for help with a weight loss program     · Exercise:  Begin to exercise slowly and do more as you get stronger  Talk with your healthcare provider before you start an exercise program      · Limit alcohol:  Your risk for polyps increases the more you drink  · Do not smoke: If you smoke, it is never too late to quit  Ask for information about how to stop  For support and more information:   · Mukul Deluca ([facility]) [address], [place] [address]  Phone: [phone] - [phone]  Web Address: www digestive  niddk nih gov  Contact your healthcare provider or gastroenterologist if:   · You have a fever  · You have chills, a cough, or feel weak and achy  · You have abdominal pain that does not go away or gets worse after you take medicine  · Your abdomen is swollen  · You are losing weight without trying  · You have questions or concerns about your condition or care  Seek care immediately or call 911 if:   · You have sudden shortness of breath       · You have a fast heart rate, fast breathing, or are too dizzy to stand up  · You have severe abdominal pain  · You see blood in your bowel movement  © 2017 [facility] Information is for End User's use only and may not be sold, redistributed or otherwise used for commercial purposes  All illustrations and images included in CareNotes® are the copyrighted property of A D A M , Inc  or Judson Villalobos  The above information is an  only  It is not intended as medical advice for individual conditions or treatments  Talk to your doctor, nurse or pharmacist before following any medical regimen to see if it is safe and effective for you

## 2018-10-02 NOTE — H&P
History and Physical -  [facility]  Melissa Nolan 48 y o  male MRN: [id number]                  HPI: Melissa Nolan is a 48y o  year old male who presents for screening colonoscopy    REVIEW OF SYSTEMS: Per the HPI, and otherwise unremarkable  Historical Information   Past Medical History:   Diagnosis Date    Hyperlipidemia      Past Surgical History:   Procedure Laterality Date    HEMORRHOID SURGERY      HERNIA REPAIR       Social History   History   Alcohol Use    Yes     Comment: occas      History   Drug Use No     History   Smoking Status    Never Smoker   Smokeless Tobacco    Never Used     Family History   Problem Relation Age of Onset    Hypertension Mother     Hyperlipidemia Mother     Hyperlipidemia Father     No Known Problems Brother        Meds/Allergies     No prescriptions prior to admission  No Known Allergies    Objective     There were no vitals taken for this visit  PHYSICAL EXAM    Gen: NAD  CV: RRR  CHEST: Clear  ABD: soft, NT/ND  EXT: no edema      ASSESSMENT/PLAN:  This is a 48y o  year old male here for screening colonoscopy and he is stable and optimized for his procedure

## 2018-10-02 NOTE — ANESTHESIA POSTPROCEDURE EVALUATION
Post-Op Assessment Note      CV Status:  Stable    Mental Status:  Alert and awake    Hydration Status:  Euvolemic    PONV Controlled:  Controlled    Airway Patency:  Patent    Post Op Vitals Reviewed: Yes          Staff: Anesthesiologist           /78 (10/02/18 0903)    Temp      Pulse (!) 46 (10/02/18 0903)   Resp 17 (10/02/18 0903)    SpO2 94 % (10/02/18 0903)

## 2018-10-02 NOTE — ANESTHESIA PREPROCEDURE EVALUATION
Review of Systems/Medical History  Patient summary reviewed        Cardiovascular  Negative cardio ROS Hyperlipidemia,    Pulmonary  Negative pulmonary ROS        GI/Hepatic  Negative GI/hepatic ROS          Negative  ROS        Endo/Other  Negative endo/other ROS      GYN  Negative gynecology ROS          Hematology  Negative hematology ROS      Musculoskeletal  Negative musculoskeletal ROS        Neurology  Negative neurology ROS      Psychology   Negative psychology ROS              Physical Exam    Airway    Mallampati score: II  TM Distance: >3 FB  Neck ROM: full     Dental   No notable dental hx     Cardiovascular  Comment: Negative ROS, Rhythm: regular, Rate: normal, Cardiovascular exam normal    Pulmonary  Pulmonary exam normal Breath sounds clear to auscultation,     Other Findings        Anesthesia Plan  ASA Score- 2     Anesthesia Type- IV sedation with anesthesia with ASA Monitors  Additional Monitors:   Airway Plan:         Plan Factors-    Induction- intravenous  Postoperative Plan-     Informed Consent- Anesthetic plan and risks discussed with patient

## 2018-10-02 NOTE — DISCHARGE INSTR - AVS FIRST PAGE
OPERATIVE REPORT  PATIENT NAME: Hallie Shell    :  1965  MRN: [id number]  Pt Location: [facility] GI ROOM 01    SURGERY DATE: 10/2/2018    Surgeon(s) and Role:     * Mallorie Bahena MD - Primary    Preop Diagnosis:  Screen for colon cancer [Z12 11]    Post-Op Diagnosis Codes:     * Screen for colon cancer [Z12 11]    Procedure(s) (LRB):  COLONOSCOPY (N/A)    Specimen(s):  ID Type Source Tests Collected by Time Destination  1 : cecal polyp- coldt snarex1 forcep x1 Tissue Polyp, Colorectal TISSUE EXAM Mallorie Bahena MD 10/2/2018 4831   2 : sigmoid polyp- hot snare Tissue Polyp, Colorectal TISSUE Edy Cassidy MD 10/2/2018 6546   3 : sigmoid polyp- hot snare Tissue Polyp, Colorectal TISSUE EXAM Mallorie Bahena MD 10/2/2018 0840     Colonoscopy Procedure Note    Procedure: Colonoscopy    Sedation: Monitored anesthesia care, check anesthesia records      ASA Class: 2    INDICATIONS:  Screening colonoscopy    POST-OP DIAGNOSIS: See the impression below    Procedure Details     Prior colonoscopy: No prior colonoscopy  Informed consent was obtained for the procedure, including sedation  Risks of perforation, hemorrhage, adverse drug reaction and aspiration were discussed  The patient was placed in the left lateral decubitus position  Based on the pre-procedure assessment, including review of the patient's medical history, medications, allergies, and review of systems, he had been deemed to be an appropriate candidate for conscious sedation; he was therefore sedated with the medications listed below  The patient was monitored continuously with telemetry, pulse oximetry, blood pressure monitoring, and direct observations  A rectal examination was performed  The colonoscope was inserted into the rectum and advanced under direct vision to the cecum, which was identified by the ileocecal valve and appendiceal orifice  The quality of the colonic preparation was excellent    A careful inspection was made as the colonoscope was withdrawn, including a retroflexed view of the rectum; findings and interventions are described below  Findings:  10 mm colonic polyp seen in the cecum by cold snare polypectomy  4 mm colonic polyp seen the cecum by cold biopsy polypectomy  30 mm colonic polyp seen in the sigmoid removed by snare cautery polypectomy  3 clips were placed at the site of polypectomy  20 mm polyp seen in the sigmoid colon removed by snare cautery polypectomy  2 clips were placed at the site of polypectomy           Complications: None; patient tolerated the procedure well  Impression:    Multiple colonic polyps removed  Follow-up biopsy results  Close monitor for post polypectomy    Recommendations:  Repeat colonoscopy in 3 years if polyp is an adenoma        SIGNATURE: Delta Hill MD  DATE: October 2, 2018  TIME: 8:49 AM

## 2018-10-04 ENCOUNTER — TELEPHONE (OUTPATIENT)
Dept: GASTROENTEROLOGY | Facility: MEDICAL CENTER | Age: 53
End: 2018-10-04

## 2018-10-04 NOTE — TELEPHONE ENCOUNTER
----- Message from Kb Mosquera MD sent at 10/3/2018  9:09 PM EDT -----  Multiple colonic polyps removed were tubular adenomas repeat in 3 years

## 2021-07-13 ENCOUNTER — TELEPHONE (OUTPATIENT)
Dept: INFECTIOUS DISEASES | Facility: CLINIC | Age: 56
End: 2021-07-13

## 2021-07-13 NOTE — TELEPHONE ENCOUNTER
Called Dr Stone Courser office regarding referral and for them to fax over office notes, labs  They stated will fax over  Will review and contact pt wife Juan José Lucasmaile regarding appt

## 2021-07-14 NOTE — TELEPHONE ENCOUNTER
Called and spoke with pt wife regarding referral and notified her that labs did not indicate lyme, and that pt should go back to PCP and discuss labs as THA came back positive, provider feels should see Rheumatology  Wife was appreciative and will follow up with PCP about seeing Rheumatology

## 2021-12-08 ENCOUNTER — OFFICE VISIT (OUTPATIENT)
Dept: FAMILY MEDICINE CLINIC | Facility: CLINIC | Age: 56
End: 2021-12-08
Payer: COMMERCIAL

## 2021-12-08 VITALS
HEART RATE: 76 BPM | WEIGHT: 170 LBS | SYSTOLIC BLOOD PRESSURE: 158 MMHG | DIASTOLIC BLOOD PRESSURE: 100 MMHG | TEMPERATURE: 98.7 F | BODY MASS INDEX: 25.76 KG/M2 | OXYGEN SATURATION: 98 % | HEIGHT: 68 IN

## 2021-12-08 DIAGNOSIS — Z11.4 SCREENING FOR HIV (HUMAN IMMUNODEFICIENCY VIRUS): ICD-10-CM

## 2021-12-08 DIAGNOSIS — E78.5 HYPERLIPIDEMIA, UNSPECIFIED HYPERLIPIDEMIA TYPE: ICD-10-CM

## 2021-12-08 DIAGNOSIS — Z11.59 ENCOUNTER FOR HEPATITIS C SCREENING TEST FOR LOW RISK PATIENT: ICD-10-CM

## 2021-12-08 DIAGNOSIS — I10 HYPERTENSION, UNSPECIFIED TYPE: Primary | ICD-10-CM

## 2021-12-08 DIAGNOSIS — Z12.5 SCREENING FOR PROSTATE CANCER: ICD-10-CM

## 2021-12-08 DIAGNOSIS — Z13.6 SCREENING FOR CARDIOVASCULAR CONDITION: ICD-10-CM

## 2021-12-08 PROBLEM — Z86.19 HISTORY OF LYME DISEASE: Status: ACTIVE | Noted: 2021-07-08

## 2021-12-08 PROCEDURE — 93000 ELECTROCARDIOGRAM COMPLETE: CPT | Performed by: FAMILY MEDICINE

## 2021-12-08 PROCEDURE — 3725F SCREEN DEPRESSION PERFORMED: CPT | Performed by: FAMILY MEDICINE

## 2021-12-08 PROCEDURE — 99204 OFFICE O/P NEW MOD 45 MIN: CPT | Performed by: FAMILY MEDICINE

## 2021-12-08 RX ORDER — AMLODIPINE BESYLATE 5 MG/1
5 TABLET ORAL DAILY
Qty: 90 TABLET | Refills: 0 | Status: SHIPPED | OUTPATIENT
Start: 2021-12-08 | End: 2021-12-20

## 2021-12-09 ENCOUNTER — APPOINTMENT (OUTPATIENT)
Dept: LAB | Facility: CLINIC | Age: 56
End: 2021-12-09
Payer: COMMERCIAL

## 2021-12-09 DIAGNOSIS — I10 HYPERTENSION, UNSPECIFIED TYPE: ICD-10-CM

## 2021-12-09 DIAGNOSIS — E78.5 HYPERLIPIDEMIA, UNSPECIFIED HYPERLIPIDEMIA TYPE: ICD-10-CM

## 2021-12-09 DIAGNOSIS — Z12.5 SCREENING FOR PROSTATE CANCER: ICD-10-CM

## 2021-12-09 DIAGNOSIS — Z11.59 ENCOUNTER FOR HEPATITIS C SCREENING TEST FOR LOW RISK PATIENT: ICD-10-CM

## 2021-12-09 DIAGNOSIS — Z11.4 SCREENING FOR HIV (HUMAN IMMUNODEFICIENCY VIRUS): ICD-10-CM

## 2021-12-09 LAB
ALBUMIN SERPL BCP-MCNC: 3.8 G/DL (ref 3.5–5)
ALP SERPL-CCNC: 65 U/L (ref 46–116)
ALT SERPL W P-5'-P-CCNC: 44 U/L (ref 12–78)
ANION GAP SERPL CALCULATED.3IONS-SCNC: 4 MMOL/L (ref 4–13)
AST SERPL W P-5'-P-CCNC: 22 U/L (ref 5–45)
BASOPHILS # BLD AUTO: 0.02 THOUSANDS/ΜL (ref 0–0.1)
BASOPHILS NFR BLD AUTO: 0 % (ref 0–1)
BILIRUB SERPL-MCNC: 0.97 MG/DL (ref 0.2–1)
BUN SERPL-MCNC: 18 MG/DL (ref 5–25)
CALCIUM SERPL-MCNC: 9.7 MG/DL (ref 8.3–10.1)
CHLORIDE SERPL-SCNC: 107 MMOL/L (ref 100–108)
CHOLEST SERPL-MCNC: 292 MG/DL
CO2 SERPL-SCNC: 27 MMOL/L (ref 21–32)
CREAT SERPL-MCNC: 1.05 MG/DL (ref 0.6–1.3)
EOSINOPHIL # BLD AUTO: 0.04 THOUSAND/ΜL (ref 0–0.61)
EOSINOPHIL NFR BLD AUTO: 1 % (ref 0–6)
ERYTHROCYTE [DISTWIDTH] IN BLOOD BY AUTOMATED COUNT: 13.5 % (ref 11.6–15.1)
GFR SERPL CREATININE-BSD FRML MDRD: 79 ML/MIN/1.73SQ M
GLUCOSE P FAST SERPL-MCNC: 97 MG/DL (ref 65–99)
HCT VFR BLD AUTO: 52.2 % (ref 36.5–49.3)
HCV AB SER QL: NORMAL
HDLC SERPL-MCNC: 53 MG/DL
HGB BLD-MCNC: 16.4 G/DL (ref 12–17)
IMM GRANULOCYTES # BLD AUTO: 0.02 THOUSAND/UL (ref 0–0.2)
IMM GRANULOCYTES NFR BLD AUTO: 0 % (ref 0–2)
LDLC SERPL CALC-MCNC: 205 MG/DL (ref 0–100)
LYMPHOCYTES # BLD AUTO: 1.1 THOUSANDS/ΜL (ref 0.6–4.47)
LYMPHOCYTES NFR BLD AUTO: 17 % (ref 14–44)
MCH RBC QN AUTO: 30.2 PG (ref 26.8–34.3)
MCHC RBC AUTO-ENTMCNC: 31.4 G/DL (ref 31.4–37.4)
MCV RBC AUTO: 96 FL (ref 82–98)
MONOCYTES # BLD AUTO: 0.57 THOUSAND/ΜL (ref 0.17–1.22)
MONOCYTES NFR BLD AUTO: 9 % (ref 4–12)
NEUTROPHILS # BLD AUTO: 4.56 THOUSANDS/ΜL (ref 1.85–7.62)
NEUTS SEG NFR BLD AUTO: 73 % (ref 43–75)
NRBC BLD AUTO-RTO: 0 /100 WBCS
PLATELET # BLD AUTO: 254 THOUSANDS/UL (ref 149–390)
PMV BLD AUTO: 11 FL (ref 8.9–12.7)
POTASSIUM SERPL-SCNC: 4.1 MMOL/L (ref 3.5–5.3)
PROT SERPL-MCNC: 7.1 G/DL (ref 6.4–8.2)
RBC # BLD AUTO: 5.43 MILLION/UL (ref 3.88–5.62)
SODIUM SERPL-SCNC: 138 MMOL/L (ref 136–145)
TRIGL SERPL-MCNC: 168 MG/DL
TSH SERPL DL<=0.05 MIU/L-ACNC: 1.43 UIU/ML (ref 0.36–3.74)
WBC # BLD AUTO: 6.31 THOUSAND/UL (ref 4.31–10.16)

## 2021-12-09 PROCEDURE — 84443 ASSAY THYROID STIM HORMONE: CPT

## 2021-12-09 PROCEDURE — 80053 COMPREHEN METABOLIC PANEL: CPT

## 2021-12-09 PROCEDURE — 86803 HEPATITIS C AB TEST: CPT

## 2021-12-09 PROCEDURE — 85025 COMPLETE CBC W/AUTO DIFF WBC: CPT

## 2021-12-09 PROCEDURE — 87389 HIV-1 AG W/HIV-1&-2 AB AG IA: CPT

## 2021-12-09 PROCEDURE — 80061 LIPID PANEL: CPT

## 2021-12-09 PROCEDURE — 84154 ASSAY OF PSA FREE: CPT

## 2021-12-09 PROCEDURE — 84153 ASSAY OF PSA TOTAL: CPT

## 2021-12-09 PROCEDURE — 36415 COLL VENOUS BLD VENIPUNCTURE: CPT

## 2021-12-10 LAB
HIV 1+2 AB+HIV1 P24 AG SERPL QL IA: NORMAL
PSA FREE MFR SERPL: 43.3 %
PSA FREE SERPL-MCNC: 0.13 NG/ML
PSA SERPL-MCNC: 0.3 NG/ML (ref 0–4)

## 2021-12-14 ENCOUNTER — TELEPHONE (OUTPATIENT)
Dept: FAMILY MEDICINE CLINIC | Facility: CLINIC | Age: 56
End: 2021-12-14

## 2021-12-20 ENCOUNTER — OFFICE VISIT (OUTPATIENT)
Dept: FAMILY MEDICINE CLINIC | Facility: CLINIC | Age: 56
End: 2021-12-20
Payer: COMMERCIAL

## 2021-12-20 VITALS
HEIGHT: 68 IN | SYSTOLIC BLOOD PRESSURE: 136 MMHG | DIASTOLIC BLOOD PRESSURE: 90 MMHG | BODY MASS INDEX: 27.01 KG/M2 | TEMPERATURE: 98.7 F | OXYGEN SATURATION: 99 % | HEART RATE: 82 BPM | WEIGHT: 178.2 LBS

## 2021-12-20 DIAGNOSIS — M25.50 ARTHRALGIA, UNSPECIFIED JOINT: ICD-10-CM

## 2021-12-20 DIAGNOSIS — I10 HYPERTENSION, UNSPECIFIED TYPE: Primary | ICD-10-CM

## 2021-12-20 DIAGNOSIS — E78.5 HYPERLIPIDEMIA, UNSPECIFIED HYPERLIPIDEMIA TYPE: ICD-10-CM

## 2021-12-20 PROCEDURE — 99214 OFFICE O/P EST MOD 30 MIN: CPT | Performed by: FAMILY MEDICINE

## 2021-12-20 PROCEDURE — 1036F TOBACCO NON-USER: CPT | Performed by: FAMILY MEDICINE

## 2021-12-20 PROCEDURE — 3008F BODY MASS INDEX DOCD: CPT | Performed by: FAMILY MEDICINE

## 2021-12-20 RX ORDER — AMLODIPINE BESYLATE 10 MG/1
10 TABLET ORAL DAILY
Qty: 90 TABLET | Refills: 1 | Status: SHIPPED | OUTPATIENT
Start: 2021-12-20 | End: 2022-07-11

## 2021-12-20 RX ORDER — MELOXICAM 15 MG/1
15 TABLET ORAL DAILY
Qty: 30 TABLET | Refills: 5 | Status: SHIPPED | OUTPATIENT
Start: 2021-12-20 | End: 2022-02-14

## 2021-12-20 RX ORDER — ATORVASTATIN CALCIUM 40 MG/1
40 TABLET, FILM COATED ORAL DAILY
Qty: 90 TABLET | Refills: 1 | Status: SHIPPED | OUTPATIENT
Start: 2021-12-20 | End: 2022-02-14

## 2022-02-14 DIAGNOSIS — E78.5 HYPERLIPIDEMIA, UNSPECIFIED HYPERLIPIDEMIA TYPE: ICD-10-CM

## 2022-02-14 DIAGNOSIS — I10 HYPERTENSION, UNSPECIFIED TYPE: Primary | ICD-10-CM

## 2022-02-14 RX ORDER — VALSARTAN 80 MG/1
80 TABLET ORAL DAILY
Qty: 90 TABLET | Refills: 1 | Status: SHIPPED | OUTPATIENT
Start: 2022-02-14 | End: 2022-08-01 | Stop reason: SDUPTHER

## 2022-02-14 RX ORDER — SIMVASTATIN 20 MG
20 TABLET ORAL
Qty: 90 TABLET | Refills: 1 | Status: SHIPPED | OUTPATIENT
Start: 2022-02-14 | End: 2022-08-01 | Stop reason: SDUPTHER

## 2022-06-07 ENCOUNTER — VBI (OUTPATIENT)
Dept: ADMINISTRATIVE | Facility: OTHER | Age: 57
End: 2022-06-07

## 2022-07-05 LAB
ALBUMIN SERPL-MCNC: 4.7 G/DL (ref 3.6–5.1)
ALBUMIN/GLOB SERPL: 2.5 (CALC) (ref 1–2.5)
ALP SERPL-CCNC: 63 U/L (ref 35–144)
ALT SERPL-CCNC: 25 U/L (ref 9–46)
AST SERPL-CCNC: 22 U/L (ref 10–35)
BILIRUB SERPL-MCNC: 0.6 MG/DL (ref 0.2–1.2)
BUN SERPL-MCNC: 19 MG/DL (ref 7–25)
BUN/CREAT SERPL: NORMAL (CALC) (ref 6–22)
CALCIUM SERPL-MCNC: 10.2 MG/DL (ref 8.6–10.3)
CHLORIDE SERPL-SCNC: 107 MMOL/L (ref 98–110)
CHOLEST SERPL-MCNC: 160 MG/DL
CHOLEST/HDLC SERPL: 2.9 (CALC)
CO2 SERPL-SCNC: 27 MMOL/L (ref 20–32)
CREAT SERPL-MCNC: 0.95 MG/DL (ref 0.7–1.33)
GLOBULIN SER CALC-MCNC: 1.9 G/DL (CALC) (ref 1.9–3.7)
GLUCOSE SERPL-MCNC: 89 MG/DL (ref 65–99)
HDLC SERPL-MCNC: 55 MG/DL
LDLC SERPL CALC-MCNC: 85 MG/DL (CALC)
NONHDLC SERPL-MCNC: 105 MG/DL (CALC)
POTASSIUM SERPL-SCNC: 4 MMOL/L (ref 3.5–5.3)
PROT SERPL-MCNC: 6.6 G/DL (ref 6.1–8.1)
SL AMB EGFR AFRICAN AMERICAN: 103 ML/MIN/1.73M2
SL AMB EGFR NON AFRICAN AMERICAN: 88 ML/MIN/1.73M2
SODIUM SERPL-SCNC: 140 MMOL/L (ref 135–146)
TRIGL SERPL-MCNC: 107 MG/DL

## 2022-07-11 ENCOUNTER — OFFICE VISIT (OUTPATIENT)
Dept: FAMILY MEDICINE CLINIC | Facility: CLINIC | Age: 57
End: 2022-07-11
Payer: COMMERCIAL

## 2022-07-11 VITALS
OXYGEN SATURATION: 98 % | TEMPERATURE: 98.8 F | DIASTOLIC BLOOD PRESSURE: 70 MMHG | HEART RATE: 75 BPM | HEIGHT: 68 IN | BODY MASS INDEX: 25.16 KG/M2 | SYSTOLIC BLOOD PRESSURE: 104 MMHG | WEIGHT: 166 LBS

## 2022-07-11 DIAGNOSIS — G47.00 INSOMNIA, UNSPECIFIED TYPE: ICD-10-CM

## 2022-07-11 DIAGNOSIS — M25.50 ARTHRALGIA, UNSPECIFIED JOINT: ICD-10-CM

## 2022-07-11 DIAGNOSIS — Z12.5 SCREENING FOR PROSTATE CANCER: ICD-10-CM

## 2022-07-11 DIAGNOSIS — Z00.00 ANNUAL PHYSICAL EXAM: Primary | ICD-10-CM

## 2022-07-11 DIAGNOSIS — E78.5 HYPERLIPIDEMIA, UNSPECIFIED HYPERLIPIDEMIA TYPE: ICD-10-CM

## 2022-07-11 DIAGNOSIS — Z86.010 HISTORY OF COLON POLYPS: ICD-10-CM

## 2022-07-11 DIAGNOSIS — I10 HYPERTENSION, UNSPECIFIED TYPE: ICD-10-CM

## 2022-07-11 PROCEDURE — 99396 PREV VISIT EST AGE 40-64: CPT | Performed by: FAMILY MEDICINE

## 2022-07-11 PROCEDURE — 3725F SCREEN DEPRESSION PERFORMED: CPT | Performed by: FAMILY MEDICINE

## 2022-07-11 RX ORDER — AMLODIPINE BESYLATE 5 MG/1
5 TABLET ORAL DAILY
Qty: 90 TABLET | Refills: 3 | Status: SHIPPED | OUTPATIENT
Start: 2022-07-11

## 2022-07-11 RX ORDER — HYDROXYZINE PAMOATE 50 MG/1
50 CAPSULE ORAL
Qty: 30 CAPSULE | Refills: 3 | Status: SHIPPED | OUTPATIENT
Start: 2022-07-11

## 2022-07-11 NOTE — ASSESSMENT & PLAN NOTE
Patient with episodes of symptomatic hypotension and low blood pressure readings at home and today in office  Patient instructed to decrease amlodipine to 5 mg daily  Continue to check BP at home and follow up in 6 months or sooner if he continues to have episodes of symptomatic hypotension

## 2022-07-11 NOTE — PROGRESS NOTES
Patient Name:  Siria Hurtado   :  1965   MRN:  [id number]   CSN:  [id number]     Subjective:   REASON FOR VISIT:    Chief Complaint   Patient presents with    Hypertension    Physical Exam        HISTORY OF PRESENT ILLNESS:     Angelika Tineo is a 62 y o  male who presents today for annual physical      Patient started a new job and it is very physical   He is using his hands a lot and states that his chronic joint pains are becoming more frequent  He gets joint pain in his hands and shoulders  He was taking mobic in the past but was not very effective  He now takes Naproxen as needed once a day  It does provide relief and he tries not to use it daily  He is working at [address] [place] [address] [employer] as a   He works until midnight and tries to get in bed   He has trouble staying asleep  He has taken Lunesta in the past which helped but it has been several years  He is requesting medication for sleep aide  Patient has been checking his BP regularly and has a couple of episodes where his SBP <100  He has felt lightheaded during these episodes  He is compliant with his medications  He has lost weight and is eating healthier  Date of last physical exam:  2018    Preventive:   BMI Counseling: Body mass index is 25 16 kg/m²  The BMI is above normal  Nutrition recommendations include decreasing overall calorie intake and moderation in carbohydrate intake  Exercise recommendations include moderate aerobic physical activity for 150 minutes/week and exercising 3-5 times per week  Diet: small meals throughout the day   Exercise: denies, hiking 1-2 times per month   Colonoscopy (>39years old) :   Overdue this year, every 3 years recommended due to polyps, last one in 2018  Last Dental Visit: Scheduled in august      Sexually active: yes with wife  2 kids  Son is 29years old and works for [employer], daughter is 21years old and working in a lab/interested in [address] [place] [address] Street:   Past Medical History:   Diagnosis Date    Hyperlipidemia     Hypertension         PAST SURGICAL HISTORY:   Past Surgical History:   Procedure Laterality Date    HEMORRHOID SURGERY      HERNIA REPAIR      HI COLONOSCOPY FLX DX W/COLLJ SPEC WHEN PFRMD N/A 10/2/2018    Procedure: COLONOSCOPY;  Surgeon: Carole Giordano MD;  Location: [facility]; Service: Gastroenterology        CURRENT MEDICATIONS:   Previous Medications    SIMVASTATIN (ZOCOR) 20 MG TABLET    Take 1 tablet (20 mg total) by mouth daily at bedtime    VALSARTAN (DIOVAN) 80 MG TABLET    Take 1 tablet (80 mg total) by mouth daily        SOCIAL HISTORY:   Social History     Tobacco Use    Smoking status: Never Smoker    Smokeless tobacco: Never Used   Substance Use Topics    Alcohol use: Yes     Comment: social        DEPRESSION SCREENING:   Depression Screening   PHQ-2/9 Depression Screening    Little interest or pleasure in doing things: 0 - not at all  Feeling down, depressed, or hopeless: 0 - not at all  PHQ-2 Score: 0  PHQ-2 Interpretation: Negative depression screen                                                                   FAMILY HISTORY:   Family History   Problem Relation Age of Onset    Hypertension Mother     Hyperlipidemia Mother     Hyperlipidemia Father     No Known Problems Brother         ALLERGIES:   No Known Allergies     ROS:   Review of Systems   Constitutional: Negative for appetite change, chills, fatigue and fever  HENT: Negative for congestion, ear discharge, ear pain, postnasal drip, rhinorrhea, sinus pressure, sinus pain, sneezing, sore throat and trouble swallowing  Eyes: Negative for pain, discharge, redness, itching and visual disturbance  Respiratory: Negative for apnea, cough, chest tightness, shortness of breath and wheezing  Cardiovascular: Negative for chest pain and leg swelling     Gastrointestinal: Negative for abdominal distention, abdominal pain, blood in stool, constipation, diarrhea, nausea and vomiting  Endocrine: Negative for polyuria  Genitourinary: Negative for decreased urine volume, difficulty urinating, dysuria, flank pain, frequency, hematuria, penile discharge, penile pain, penile swelling, scrotal swelling, testicular pain and urgency  Musculoskeletal: Positive for arthralgias  Negative for back pain, gait problem, joint swelling, myalgias, neck pain and neck stiffness  Skin: Negative for rash  Neurological: Negative for dizziness, weakness, light-headedness, numbness and headaches  Psychiatric/Behavioral: Positive for sleep disturbance  Negative for behavioral problems  The patient is not nervous/anxious  All other systems reviewed and are negative  Objective:   PHYSICAL EXAM:     /70 (BP Location: Left arm, Patient Position: Sitting, Cuff Size: Standard)   Pulse 75   Temp 98 8 °F (37 1 °C) (Tympanic)   Ht 5' 8 11" (1 73 m)   Wt 75 3 kg (166 lb)   SpO2 98%   BMI 25 16 kg/m²    No exam data present   Physical Exam  Vitals and nursing note reviewed  Constitutional:       General: He is not in acute distress  Appearance: Normal appearance  He is well-developed  He is not toxic-appearing  HENT:      Head: Normocephalic and atraumatic  Right Ear: Tympanic membrane, ear canal and external ear normal       Left Ear: Tympanic membrane, ear canal and external ear normal       Nose: Nose normal       Mouth/Throat:      Mouth: Mucous membranes are moist       Pharynx: Oropharynx is clear  No oropharyngeal exudate  Eyes:      Extraocular Movements: Extraocular movements intact  Conjunctiva/sclera: Conjunctivae normal       Pupils: Pupils are equal, round, and reactive to light  Cardiovascular:      Rate and Rhythm: Normal rate and regular rhythm  Heart sounds: Normal heart sounds  No murmur heard  Pulmonary:      Effort: Pulmonary effort is normal  No respiratory distress  Breath sounds: Normal breath sounds  No wheezing     Abdominal: General: Abdomen is flat  Bowel sounds are normal       Palpations: Abdomen is soft  Tenderness: There is no abdominal tenderness  Hernia: A hernia is present  Hernia is present in the umbilical area  Musculoskeletal:         General: Normal range of motion  Cervical back: Normal range of motion and neck supple  Lymphadenopathy:      Cervical: No cervical adenopathy  Skin:     General: Skin is warm and dry  Neurological:      General: No focal deficit present  Mental Status: He is alert and oriented to person, place, and time  Mental status is at baseline  Psychiatric:         Mood and Affect: Mood normal          Behavior: Behavior normal          Thought Content: Thought content normal          Judgment: Judgment normal           Assessment/Plan:   Problem List Items Addressed This Visit        Cardiovascular and Mediastinum    Hypertension     Patient with episodes of symptomatic hypotension and low blood pressure readings at home and today in office  Patient instructed to decrease amlodipine to 5 mg daily  Continue to check BP at home and follow up in 6 months or sooner if he continues to have episodes of symptomatic hypotension  Relevant Medications    amLODIPine (NORVASC) 5 mg tablet    Other Relevant Orders    CBC and differential    Comprehensive metabolic panel    TSH, 3rd generation       Other    Screening for prostate cancer    Relevant Orders    PSA, Total Screen    Hyperlipidemia     Cholesterol is now well controlled since starting Zocor 20 mg  Continue current medication and recheck in 6 months  Relevant Orders    Lipid Panel with Direct LDL reflex    Arthralgia     Chronic arthralgias  Patient is taking Naproxen but was advised not to take daily due to GI upset and kidney function  He was prescribed voltaren gel to apply to affected joints as needed  Follow up if symptoms worsen and consider referral to Ortho              Relevant Medications Diclofenac Sodium (VOLTAREN) 1 %    Insomnia     Insomnia is chronic  Patient has tried different modalities to improve his sleep hygiene  Patient has taken Lunesta in the past which helped his symptoms  Please start medication which was prescribed:  Vistaril 50 mg nightly prn  Side effects of the medication were discussed with the patient, which include daytime drowsiness  Follow up in 1 month for assessment of insomnia and adjustment of medication if needed                 Relevant Medications    hydrOXYzine pamoate (VISTARIL) 50 mg capsule      Other Visit Diagnoses     Annual physical exam    -  Primary    Relevant Orders    CBC and differential    Comprehensive metabolic panel    History of colon polyps        Relevant Orders    Ambulatory referral for colonoscopy          Lul Mcgill DO

## 2022-07-11 NOTE — ASSESSMENT & PLAN NOTE
Chronic arthralgias  Patient is taking Naproxen but was advised not to take daily due to GI upset and kidney function  He was prescribed voltaren gel to apply to affected joints as needed  Follow up if symptoms worsen and consider referral to Ortho

## 2022-07-11 NOTE — ASSESSMENT & PLAN NOTE
Cholesterol is now well controlled since starting Zocor 20 mg  Continue current medication and recheck in 6 months

## 2022-07-11 NOTE — ASSESSMENT & PLAN NOTE
Insomnia is chronic  Patient has tried different modalities to improve his sleep hygiene  Patient has taken Lunesta in the past which helped his symptoms  Please start medication which was prescribed:  Vistaril 50 mg nightly prn  Side effects of the medication were discussed with the patient, which include daytime drowsiness  Follow up in 1 month for assessment of insomnia and adjustment of medication if needed

## 2022-08-01 ENCOUNTER — PATIENT MESSAGE (OUTPATIENT)
Dept: FAMILY MEDICINE CLINIC | Facility: CLINIC | Age: 57
End: 2022-08-01

## 2022-08-01 DIAGNOSIS — E78.5 HYPERLIPIDEMIA, UNSPECIFIED HYPERLIPIDEMIA TYPE: ICD-10-CM

## 2022-08-01 DIAGNOSIS — I10 HYPERTENSION, UNSPECIFIED TYPE: ICD-10-CM

## 2022-08-01 RX ORDER — SIMVASTATIN 20 MG
20 TABLET ORAL
Qty: 90 TABLET | Refills: 1 | Status: SHIPPED | OUTPATIENT
Start: 2022-08-01

## 2022-08-01 RX ORDER — VALSARTAN 80 MG/1
80 TABLET ORAL DAILY
Qty: 90 TABLET | Refills: 1 | Status: SHIPPED | OUTPATIENT
Start: 2022-08-01

## 2023-03-22 ENCOUNTER — TELEPHONE (OUTPATIENT)
Dept: OBGYN CLINIC | Facility: OTHER | Age: 58
End: 2023-03-22

## 2023-03-22 DIAGNOSIS — M19.049 HAND ARTHRITIS: Primary | ICD-10-CM

## 2023-03-22 NOTE — TELEPHONE ENCOUNTER
Patient is being referred to a orthopedics  Please schedule accordingly      [address]   [phone]

## 2023-07-13 DIAGNOSIS — I10 HYPERTENSION, UNSPECIFIED TYPE: ICD-10-CM

## 2023-07-14 ENCOUNTER — TELEPHONE (OUTPATIENT)
Dept: FAMILY MEDICINE CLINIC | Facility: CLINIC | Age: 58
End: 2023-07-14

## 2023-07-14 DIAGNOSIS — Z00.00 ANNUAL PHYSICAL EXAM: ICD-10-CM

## 2023-07-14 DIAGNOSIS — Z12.5 SCREENING FOR PROSTATE CANCER: ICD-10-CM

## 2023-07-14 DIAGNOSIS — I10 HYPERTENSION, UNSPECIFIED TYPE: ICD-10-CM

## 2023-07-14 DIAGNOSIS — Z13.220 SCREENING FOR LIPID DISORDERS: ICD-10-CM

## 2023-07-14 DIAGNOSIS — E78.5 HYPERLIPIDEMIA, UNSPECIFIED HYPERLIPIDEMIA TYPE: Primary | ICD-10-CM

## 2023-07-14 RX ORDER — AMLODIPINE BESYLATE 5 MG/1
5 TABLET ORAL DAILY
Qty: 90 TABLET | Refills: 3 | Status: SHIPPED | OUTPATIENT
Start: 2023-07-14

## 2023-07-14 NOTE — TELEPHONE ENCOUNTER
Pt scheduled physical for 8/7 is asking for Amlodipine to hold him over until then. Also is asking about lab work for the appt.     Please advise [phone]

## 2023-07-14 NOTE — TELEPHONE ENCOUNTER
Medication was refilled and lab orders were placed. Please advise patient to complete fasting labs prior to appt.

## 2023-08-07 ENCOUNTER — OFFICE VISIT (OUTPATIENT)
Dept: FAMILY MEDICINE CLINIC | Facility: CLINIC | Age: 58
End: 2023-08-07
Payer: COMMERCIAL

## 2023-08-07 VITALS
TEMPERATURE: 98.3 F | BODY MASS INDEX: 23.94 KG/M2 | DIASTOLIC BLOOD PRESSURE: 80 MMHG | HEIGHT: 70 IN | OXYGEN SATURATION: 99 % | HEART RATE: 66 BPM | SYSTOLIC BLOOD PRESSURE: 120 MMHG | WEIGHT: 167.2 LBS

## 2023-08-07 DIAGNOSIS — Z00.00 ANNUAL PHYSICAL EXAM: Primary | ICD-10-CM

## 2023-08-07 DIAGNOSIS — E78.5 HYPERLIPIDEMIA, UNSPECIFIED HYPERLIPIDEMIA TYPE: ICD-10-CM

## 2023-08-07 DIAGNOSIS — Z12.11 SCREEN FOR COLON CANCER: ICD-10-CM

## 2023-08-07 DIAGNOSIS — I10 HYPERTENSION, UNSPECIFIED TYPE: ICD-10-CM

## 2023-08-07 PROCEDURE — 99396 PREV VISIT EST AGE 40-64: CPT | Performed by: FAMILY MEDICINE

## 2023-08-07 NOTE — PROGRESS NOTES
Patient Name:  Alta Brizuela   :  1965   MRN:  [id number]   CSN:  [id number]     Subjective:   REASON FOR VISIT:    Chief Complaint   Patient presents with   • Physical Exam        HISTORY OF PRESENT ILLNESS:     Rishi Regalado is a 62 y.o. male who presents today for annual physical.   He is feeling well and is very active. He denies any chest pain, shortness of breath, or insomnia. Preventive: The patient's BMI is Body mass index is 24.34 kg/m². Enbrennan Arana The BMI is in the acceptable range   Diet: small meals throughout the day   Exercise:  hiking 1-2 times per month   Colonoscopy (>39years old) : Overdue, every 3 years recommended due to polyps, last one in 2018  Last Dental Visit: every 6 months, [address]      Sexually active: yes with wife  2 kids  Son is 29years old and works for [employer], daughter is 21years old and working in a lab/interested in [employer]:   Past Medical History:   Diagnosis Date   • Hyperlipidemia    • Hypertension         PAST SURGICAL HISTORY:   Past Surgical History:   Procedure Laterality Date   • HEMORRHOID SURGERY     • HERNIA REPAIR     • MA COLONOSCOPY FLX DX W/COLLJ SPEC WHEN PFRMD N/A 10/2/2018    Procedure: COLONOSCOPY;  Surgeon: Sarwat Marsh MD;  Location: [facility]; Service: Gastroenterology        CURRENT MEDICATIONS:   Previous Medications    AMLODIPINE (NORVASC) 5 MG TABLET    Take 1 tablet (5 mg total) by mouth daily    SIMVASTATIN (ZOCOR) 20 MG TABLET    take one tablet (20mg total) by mouth daily at bedtime.     VALSARTAN (DIOVAN) 80 MG TABLET    Take 1 tablet (80 mg total) by mouth daily        SOCIAL HISTORY:   Social History     Tobacco Use   • Smoking status: Never   • Smokeless tobacco: Never   Substance Use Topics   • Alcohol use: Yes     Comment: 1 or 2 drinks once or twice a month        DEPRESSION SCREENING:   Depression Screening   PHQ-2/9 Depression Screening    Little interest or pleasure in doing things: 0 - not at all  Feeling down, depressed, or hopeless: 0 - not at all  PHQ-2 Score: 0  PHQ-2 Interpretation: Negative depression screen                                                                   FAMILY HISTORY:   Family History   Problem Relation Age of Onset   • Hypertension Mother    • Hyperlipidemia Mother    • Hyperlipidemia Father    • No Known Problems Brother         ALLERGIES:   No Known Allergies     ROS:   Review of Systems   Constitutional: Negative for appetite change, chills, fatigue and fever. HENT: Negative for congestion, ear discharge, ear pain, postnasal drip, rhinorrhea, sinus pressure, sinus pain, sneezing, sore throat and trouble swallowing. Eyes: Negative for pain, discharge, redness, itching and visual disturbance. Respiratory: Negative for apnea, cough, chest tightness, shortness of breath and wheezing. Cardiovascular: Negative for chest pain and leg swelling. Gastrointestinal: Negative for abdominal distention, abdominal pain, blood in stool, constipation, diarrhea, nausea and vomiting. Endocrine: Negative for polyuria. Genitourinary: Negative for decreased urine volume, difficulty urinating, dysuria, flank pain, frequency, hematuria, penile discharge, penile pain, penile swelling, scrotal swelling, testicular pain and urgency. Musculoskeletal: Negative for arthralgias, back pain, gait problem, joint swelling, myalgias, neck pain and neck stiffness. Skin: Negative for rash. Neurological: Negative for dizziness, weakness, light-headedness, numbness and headaches. Psychiatric/Behavioral: Negative for behavioral problems. The patient is not nervous/anxious. All other systems reviewed and are negative.        Objective:   PHYSICAL EXAM:     /80 (BP Location: Left arm, Patient Position: Sitting, Cuff Size: Adult)   Pulse 66   Temp 98.3 °F (36.8 °C)   Ht 5' 9.5" (1.765 m)   Wt 75.8 kg (167 lb 3.2 oz)   SpO2 99%   BMI 24.34 kg/m²    Vision Screening    Right eye Left eye Both eyes   Without correction 20/25 20/25 20/25   With correction         Physical Exam  Vitals and nursing note reviewed. Constitutional:       General: He is not in acute distress. Appearance: Normal appearance. He is well-developed. He is not toxic-appearing. HENT:      Head: Normocephalic and atraumatic. Right Ear: Tympanic membrane, ear canal and external ear normal.      Left Ear: Tympanic membrane, ear canal and external ear normal.      Nose: Nose normal.      Mouth/Throat:      Mouth: Mucous membranes are moist.      Pharynx: Oropharynx is clear. No oropharyngeal exudate. Eyes:      Extraocular Movements: Extraocular movements intact. Conjunctiva/sclera: Conjunctivae normal.      Pupils: Pupils are equal, round, and reactive to light. Cardiovascular:      Rate and Rhythm: Normal rate and regular rhythm. Heart sounds: Normal heart sounds. No murmur heard. Pulmonary:      Effort: Pulmonary effort is normal. No respiratory distress. Breath sounds: Normal breath sounds. No wheezing. Abdominal:      General: Abdomen is flat. Bowel sounds are normal.      Palpations: Abdomen is soft. Tenderness: There is no abdominal tenderness. Musculoskeletal:         General: Normal range of motion. Cervical back: Normal range of motion and neck supple. Lymphadenopathy:      Cervical: No cervical adenopathy. Skin:     General: Skin is warm and dry. Neurological:      General: No focal deficit present. Mental Status: He is alert and oriented to person, place, and time. Mental status is at baseline. Psychiatric:         Mood and Affect: Mood normal.         Behavior: Behavior normal.         Thought Content: Thought content normal.         Judgment: Judgment normal.          Assessment/Plan:   Problem List Items Addressed This Visit        Cardiovascular and Mediastinum    Hypertension     Well controlled on current blood pressure medications. Other    Screen for colon cancer    Relevant Orders    Ambulatory Referral to Gastroenterology    Hyperlipidemia     Well controlled on statin. Other Visit Diagnoses     Annual physical exam    -  Primary        Blood work was reviewed with patient today. Patient Counseling:   · Exercise: Stressed the importance of regular exercise.      150 minutes of cardiovascular exercise encouraged   · Nutrition: Stressed importance of moderation in sodium/caffeine intake, saturated fat and cholesterol, caloric balance, sufficient intake of fresh fruits, vegetables, fiber     Rina Lessen DO

## 2023-08-10 DIAGNOSIS — I10 HYPERTENSION, UNSPECIFIED TYPE: ICD-10-CM

## 2023-08-10 RX ORDER — AMLODIPINE BESYLATE 5 MG/1
TABLET ORAL
Qty: 90 TABLET | Refills: 0 | OUTPATIENT
Start: 2023-08-10

## 2023-08-11 RX ORDER — VALSARTAN 80 MG/1
80 TABLET ORAL DAILY
Qty: 90 TABLET | Refills: 0 | Status: SHIPPED | OUTPATIENT
Start: 2023-08-11

## 2023-09-21 ENCOUNTER — TELEPHONE (OUTPATIENT)
Dept: GASTROENTEROLOGY | Facility: CLINIC | Age: 58
End: 2023-09-21

## 2023-09-21 ENCOUNTER — PREP FOR PROCEDURE (OUTPATIENT)
Dept: GASTROENTEROLOGY | Facility: CLINIC | Age: 58
End: 2023-09-21

## 2023-09-21 ENCOUNTER — OFFICE VISIT (OUTPATIENT)
Dept: FAMILY MEDICINE CLINIC | Facility: CLINIC | Age: 58
End: 2023-09-21
Payer: COMMERCIAL

## 2023-09-21 VITALS
SYSTOLIC BLOOD PRESSURE: 110 MMHG | TEMPERATURE: 98.1 F | WEIGHT: 159 LBS | HEART RATE: 72 BPM | OXYGEN SATURATION: 97 % | DIASTOLIC BLOOD PRESSURE: 78 MMHG | BODY MASS INDEX: 23.14 KG/M2 | RESPIRATION RATE: 18 BRPM

## 2023-09-21 DIAGNOSIS — Z86.010 HISTORY OF COLON POLYPS: Primary | ICD-10-CM

## 2023-09-21 DIAGNOSIS — R31.29 OTHER MICROSCOPIC HEMATURIA: ICD-10-CM

## 2023-09-21 DIAGNOSIS — M79.671 PAIN OF RIGHT HEEL: ICD-10-CM

## 2023-09-21 DIAGNOSIS — R10.9 RIGHT FLANK PAIN: Primary | ICD-10-CM

## 2023-09-21 DIAGNOSIS — L98.9 SKIN LESION OF FOOT: ICD-10-CM

## 2023-09-21 LAB
SL AMB  POCT GLUCOSE, UA: ABNORMAL
SL AMB LEUKOCYTE ESTERASE,UA: ABNORMAL
SL AMB POCT BILIRUBIN,UA: ABNORMAL
SL AMB POCT BLOOD,UA: ABNORMAL
SL AMB POCT CLARITY,UA: CLEAR
SL AMB POCT COLOR,UA: YELLOW
SL AMB POCT KETONES,UA: ABNORMAL
SL AMB POCT NITRITE,UA: ABNORMAL
SL AMB POCT PH,UA: 5.5
SL AMB POCT SPECIFIC GRAVITY,UA: 1.02
SL AMB POCT URINE PROTEIN: ABNORMAL
SL AMB POCT UROBILINOGEN: 0.2

## 2023-09-21 PROCEDURE — 87086 URINE CULTURE/COLONY COUNT: CPT | Performed by: FAMILY MEDICINE

## 2023-09-21 PROCEDURE — 99214 OFFICE O/P EST MOD 30 MIN: CPT | Performed by: FAMILY MEDICINE

## 2023-09-21 PROCEDURE — 81003 URINALYSIS AUTO W/O SCOPE: CPT | Performed by: FAMILY MEDICINE

## 2023-09-21 NOTE — PROGRESS NOTES
Name: Sho Acosta      : 1965      MRN: [id number]  Encounter Provider: Kory Tom DO  Encounter Date: 2023   Encounter department: [address] [place] [address]   Patient is a 62year old female seen for sudden episode of flank pain associated with nausea. His urine in the office did test positive for blood. I have ordered a STAT CT to R/O kidney stone. He also complains of some difficulty with urine stream.  1. Right flank pain  -     POCT urine dip auto non-scope  -     Urine culture; Future  -     CT renal stone study abdomen pelvis wo contrast; Future; Expected date: 2023  -     Urine culture    2. Pain of right heel  -     Ambulatory Referral to Podiatry; Future    3. Skin lesion of foot  -     Ambulatory Referral to Podiatry; Future    4. Other microscopic hematuria  -     Urine culture; Future  -     CT renal stone study abdomen pelvis wo contrast; Future; Expected date: 2023  -     Urine culture       Also discussed heel pain - I have referred him to podiatry. Possible plantar fascitis vs heel spur. He also has a brown skin lesion on the side of his right heel. Subjective      Chief Complaint   Patient presents with   • Back Pain       Patient had an attack while eating a sandwich - right flank pain - got sweaty. Went away and then about three hours later had similar episode. No history of kidney stones. Is having urinary frequency. Stream seems weaker. Feels like he has lost weight in the past week - decreased appetite. Bowels are normal. No vomiting    Also with pain in right foot. Pain in heel. Back Pain      Review of Systems   Constitutional: Positive for appetite change. Gastrointestinal: Positive for nausea. Musculoskeletal: Positive for arthralgias and back pain.        Current Outpatient Medications on File Prior to Visit   Medication Sig   • amLODIPine (NORVASC) 5 mg tablet Take 1 tablet (5 mg total) by mouth daily   • simvastatin (ZOCOR) 20 mg tablet take one tablet (20mg total) by mouth daily at bedtime. • valsartan (DIOVAN) 80 mg tablet TAKE ONE TABLET BY MOUTH EVERY DAY       Objective     /78 (BP Location: Right arm, Patient Position: Sitting, Cuff Size: Standard)   Pulse 72   Temp 98.1 °F (36.7 °C) (Temporal)   Resp 18   Wt 72.1 kg (159 lb)   SpO2 97%   BMI 23.14 kg/m²     Physical Exam  Vitals and nursing note reviewed. Constitutional:       General: He is not in acute distress. HENT:      Head: Normocephalic. Cardiovascular:      Rate and Rhythm: Normal rate and regular rhythm. Pulmonary:      Effort: Pulmonary effort is normal.      Breath sounds: Normal breath sounds. Abdominal:      Tenderness: There is right CVA tenderness. There is no left CVA tenderness. Musculoskeletal:         General: Tenderness (right heel) present. Right lower leg: No edema. Left lower leg: No edema. Skin:     Findings: Lesion (5 mm brownish  lesion on side of right heel) present. Neurological:      Mental Status: He is alert and oriented to person, place, and time.    Psychiatric:         Mood and Affect: Mood normal.       Sunshine Juarez DO

## 2023-09-21 NOTE — TELEPHONE ENCOUNTER
Scheduled date of colonoscopy (as of today): 12/29/23      Physician performing colonoscopy: JUAN      Location of colonoscopy: [facility]      Clearances: NONE      TEENA/ DUL PREP SENT TO [facility]

## 2023-09-22 ENCOUNTER — OFFICE VISIT (OUTPATIENT)
Dept: UROLOGY | Facility: CLINIC | Age: 58
End: 2023-09-22
Payer: COMMERCIAL

## 2023-09-22 ENCOUNTER — HOSPITAL ENCOUNTER (OUTPATIENT)
Dept: CT IMAGING | Facility: HOSPITAL | Age: 58
End: 2023-09-22
Payer: COMMERCIAL

## 2023-09-22 ENCOUNTER — TELEPHONE (OUTPATIENT)
Age: 58
End: 2023-09-22

## 2023-09-22 ENCOUNTER — TELEPHONE (OUTPATIENT)
Dept: FAMILY MEDICINE CLINIC | Facility: CLINIC | Age: 58
End: 2023-09-22

## 2023-09-22 VITALS
WEIGHT: 160.2 LBS | DIASTOLIC BLOOD PRESSURE: 72 MMHG | HEIGHT: 69 IN | RESPIRATION RATE: 16 BRPM | OXYGEN SATURATION: 97 % | HEART RATE: 70 BPM | SYSTOLIC BLOOD PRESSURE: 120 MMHG | BODY MASS INDEX: 23.73 KG/M2

## 2023-09-22 DIAGNOSIS — N13.2 HYDRONEPHROSIS WITH URINARY OBSTRUCTION DUE TO RENAL CALCULUS: ICD-10-CM

## 2023-09-22 DIAGNOSIS — R31.29 OTHER MICROSCOPIC HEMATURIA: ICD-10-CM

## 2023-09-22 DIAGNOSIS — R10.9 RIGHT FLANK PAIN: ICD-10-CM

## 2023-09-22 DIAGNOSIS — N20.1 RIGHT URETERAL STONE: Primary | ICD-10-CM

## 2023-09-22 DIAGNOSIS — R10.9 FLANK PAIN: Primary | ICD-10-CM

## 2023-09-22 DIAGNOSIS — N20.1 RIGHT URETERAL STONE: ICD-10-CM

## 2023-09-22 LAB — BACTERIA UR CULT: NORMAL

## 2023-09-22 PROCEDURE — 99204 OFFICE O/P NEW MOD 45 MIN: CPT | Performed by: UROLOGY

## 2023-09-22 PROCEDURE — G1004 CDSM NDSC: HCPCS

## 2023-09-22 PROCEDURE — 74176 CT ABD & PELVIS W/O CONTRAST: CPT

## 2023-09-22 RX ORDER — TAMSULOSIN HYDROCHLORIDE 0.4 MG/1
0.4 CAPSULE ORAL
Qty: 30 CAPSULE | Refills: 0 | Status: SHIPPED | OUTPATIENT
Start: 2023-09-22

## 2023-09-22 NOTE — TELEPHONE ENCOUNTER
Spoke w/Karen at [facility]. Karen stated that due to the ASAP on order, the appointment would need to be scheduled with pt by triage. Pt notified that triage would be calling him to schedule.

## 2023-09-22 NOTE — PROGRESS NOTES
UROLOGY NEW PATIENT ENCOUNTER    Stephan Arenas is a 62 y.o. male referred for urolithiasis. Saw PCP 9/21/23 with R flank pain. U dip 9/21/23: -N, -L, +B    CTAP w/o 9/22/23: mildly obstructing 7 mm stone R prox ureter; mild R HN, no L HN; BL renal stones    Denied previous stone history    Family hx: father with stones          Assessment and plan:     Urolithiasis    Patient presented to the PCP office on 9/21/2023 with right flank pain, nausea. He had CT abdomen pelvis without contrast on 9/22/2023 morning which demonstrated mildly obstructing 7 mm right proximal ureteral stone. Images were personally reviewed. Stone was visualized in the proximal ureter. Patient additionally has bilateral nonobstructing stones in both kidneys. Patient denies fevers, chills, nausea, vomiting at this time. His urine dip in the PCPs office yesterday was negative for infection. Additional urine culture was sent from the PCP office. I explained to the patient that given the size of the stone, it is very unlikely that he will pass the stone on his own. Given his continued symptoms, I explained that we should move forward with surgical intervention next week if possible. He was consented for urologic procedure with rest.  I consented him for right ureteroscopy and stone treatment. I explained to him that we will attempt to treat his right-sided stone burden on the initial surgery. I also explained that there was a chance that if his urine appeared infected or if his ureter was too tight to navigate up with a ureteroscope, we would just place a stent and stage his stone treatment for another date. He verbalized understanding. We reviewed the basic risks of the surgery including but not limited to: General anesthesia risks, infection, bleeding, damage to urethra/bladder/ureter/kidney. I explained that he would likely have a ureteral stent after his procedure.   We reviewed the basic symptoms that could happen with ureteral stent in place. He verbalized understanding. I explained to him that if his urine culture came back positive, we would send him antibiotics. All questions were answered. Hydronephrosis    See above    Flank pain    See above        PLAN  -Fast track OR for R ureteroscopy, stone treatment. Consent signed in office today.  -Follow up UCx from PCP office 9/21. Will send abx if positive. Portions of the above record have been created with voice recognition software. Occasional wrong word or "sound alike" substitution may have occurred due to the inherent limitations of voice recognition software. Read the chart carefully and recognize, using context, where substitution may have occurred. Sanju Ends, DO        Chief Complaint     Urolithiasis    History of Present Illness     Benton Giordano is a 62 y.o. male presenting in consultation for urolithiasis. The patient was referred by Dr. Pancho Bello and today's note has been sent to the referring provider. See summary above    Denied fevers, chills    Having intermittent R flank pain, nausea        The following portions of the patient's history were reviewed and updated as appropriate: allergies, current medications, past family history, past medical history, past social history, past surgical history and problem list.        Review of Systems     Review of Systems   Constitutional: Negative for chills and fever. Respiratory: Negative for cough and shortness of breath. Gastrointestinal: Positive for nausea. Negative for abdominal pain. Genitourinary: Negative for dysuria and hematuria. Neurological: Negative for dizziness and headaches. Psychiatric/Behavioral: Negative for agitation and behavioral problems. Allergies     No Known Allergies    Physical Exam     Physical Exam  Constitutional:       General: He is not in acute distress. Appearance: Normal appearance.    Pulmonary:      Effort: Pulmonary effort is normal. No respiratory distress. Abdominal:      General: Abdomen is flat. Palpations: Abdomen is soft. Tenderness: There is right CVA tenderness. There is no left CVA tenderness. Skin:     General: Skin is warm and dry. Neurological:      General: No focal deficit present. Mental Status: He is alert and oriented to person, place, and time. Psychiatric:         Mood and Affect: Mood normal.         Behavior: Behavior normal.             Vital Signs  There were no vitals filed for this visit. Current Medications       Current Outpatient Medications:   •  amLODIPine (NORVASC) 5 mg tablet, Take 1 tablet (5 mg total) by mouth daily, Disp: 90 tablet, Rfl: 3  •  simvastatin (ZOCOR) 20 mg tablet, take one tablet (20mg total) by mouth daily at bedtime. , Disp: 90 tablet, Rfl: 3  •  tamsulosin (FLOMAX) 0.4 mg, Take 1 capsule (0.4 mg total) by mouth daily with dinner, Disp: 30 capsule, Rfl: 0  •  valsartan (DIOVAN) 80 mg tablet, TAKE ONE TABLET BY MOUTH EVERY DAY, Disp: 90 tablet, Rfl: 0      Active Problems     Patient Active Problem List   Diagnosis   • Well adult exam   • Screen for colon cancer   • Screening for diabetes mellitus   • Screening for cardiovascular condition   • Screening for deficiency anemia   • Thyroid disorder screen   • Screening for prostate cancer   • Umbilical hernia without obstruction and without gangrene   • Hyperlipidemia   • Grade II hemorrhoids   • Dyspepsia   • History of Lyme disease   • Hypertension   • Arthralgia   • Insomnia         Past Medical History     Past Medical History:   Diagnosis Date   • Hyperlipidemia    • Hypertension          Surgical History     Past Surgical History:   Procedure Laterality Date   • HEMORRHOID SURGERY     • HERNIA REPAIR     • [place] COLONOSCOPY FLX DX W/COLLJ SPEC WHEN PFRMD N/A 10/2/2018    Procedure: COLONOSCOPY;  Surgeon: Jaki Aguilar MD;  Location: [facility] GI [facility];   Service: Gastroenterology         Family History     Family History   Problem Relation Age of Onset   • Hypertension Mother    • Hyperlipidemia Mother    • Hyperlipidemia Father    • No Known Problems Brother          Social History     Social History     Social History     Tobacco Use   Smoking Status Never   Smokeless Tobacco Never         Pertinent Lab Values     Lab Results   Component Value Date    CREATININE 0.95 07/05/2022       Lab Results   Component Value Date    PSA 0.3 12/09/2021    PSA 0.2 07/14/2018         Pertinent Imaging     The patient's images were reviewed by me personally and also in real time with them in the examination room using our PACS imaging system.       CTAP w/o 9/22/23: mildly obstructing 7 mm stone R prox ureter; mild R HN, no L HN; BL renal stones      Pertinent Pathology     N/A

## 2023-09-22 NOTE — LETTER
September 22, 2023     Paula Rodgers, [address]  [address]    Patient: Kelly Mcnulty   YOB: 1965   Date of Visit: 9/22/2023       Dear Dr. Carlos Ha:    Thank you for referring Monique Lerma to me for evaluation. Below are my notes for this consultation. If you have questions, please do not hesitate to call me. I look forward to following your patient along with you. Sincerely,        [employer], DO        CC: Isha Sprague, DO    [employer], DO  9/22/2023  3:26 PM  Sign when Signing Visit  UROLOGY NEW PATIENT ENCOUNTER    Kelly Mcnulty is a 62 y.o. male referred for urolithiasis. Saw PCP 9/21/23 with R flank pain. U dip 9/21/23: -N, -L, +B    CTAP w/o 9/22/23: mildly obstructing 7 mm stone R prox ureter; mild R HN, no L HN; BL renal stones    Denied previous stone history    Family hx: father with stones          Assessment and plan:     Urolithiasis    Patient presented to the PCP office on 9/21/2023 with right flank pain, nausea. He had CT abdomen pelvis without contrast on 9/22/2023 morning which demonstrated mildly obstructing 7 mm right proximal ureteral stone. Images were personally reviewed. Stone was visualized in the proximal ureter. Patient additionally has bilateral nonobstructing stones in both kidneys. Patient denies fevers, chills, nausea, vomiting at this time. His urine dip in the PCPs office yesterday was negative for infection. Additional urine culture was sent from the PCP office. I explained to the patient that given the size of the stone, it is very unlikely that he will pass the stone on his own. Given his continued symptoms, I explained that we should move forward with surgical intervention next week if possible. He was consented for urologic procedure with rest.  I consented him for right ureteroscopy and stone treatment.     I explained to him that we will attempt to treat his right-sided stone burden on the initial surgery. I also explained that there was a chance that if his urine appeared infected or if his ureter was too tight to navigate up with a ureteroscope, we would just place a stent and stage his stone treatment for another date. He verbalized understanding. We reviewed the basic risks of the surgery including but not limited to: General anesthesia risks, infection, bleeding, damage to urethra/bladder/ureter/kidney. I explained that he would likely have a ureteral stent after his procedure. We reviewed the basic symptoms that could happen with ureteral stent in place. He verbalized understanding. I explained to him that if his urine culture came back positive, we would send him antibiotics. All questions were answered. Hydronephrosis    See above    Flank pain    See above        PLAN  -Fast track OR for R ureteroscopy, stone treatment. Consent signed in office today.  -Follow up UCx from PCP office 9/21. Will send abx if positive. Portions of the above record have been created with voice recognition software. Occasional wrong word or "sound alike" substitution may have occurred due to the inherent limitations of voice recognition software. Read the chart carefully and recognize, using context, where substitution may have occurred. Josue Swan, DO        Chief Complaint     Urolithiasis    History of Present Illness     Corina Harrison is a 62 y.o. male presenting in consultation for urolithiasis. The patient was referred by Dr. Elissa Shaffer and today's note has been sent to the referring provider.       See summary above    Denied fevers, chills    Having intermittent R flank pain, nausea        The following portions of the patient's history were reviewed and updated as appropriate: allergies, current medications, past family history, past medical history, past social history, past surgical history and problem list.        Review of Systems     Review of Systems   Constitutional: Negative for chills and fever. Respiratory: Negative for cough and shortness of breath. Gastrointestinal: Positive for nausea. Negative for abdominal pain. Genitourinary: Negative for dysuria and hematuria. Neurological: Negative for dizziness and headaches. Psychiatric/Behavioral: Negative for agitation and behavioral problems. Allergies     No Known Allergies    Physical Exam     Physical Exam  Constitutional:       General: He is not in acute distress. Appearance: Normal appearance. Pulmonary:      Effort: Pulmonary effort is normal. No respiratory distress. Abdominal:      General: Abdomen is flat. Palpations: Abdomen is soft. Tenderness: There is right CVA tenderness. There is no left CVA tenderness. Skin:     General: Skin is warm and dry. Neurological:      General: No focal deficit present. Mental Status: He is alert and oriented to person, place, and time. Psychiatric:         Mood and Affect: Mood normal.         Behavior: Behavior normal.             Vital Signs  There were no vitals filed for this visit. Current Medications       Current Outpatient Medications:   •  amLODIPine (NORVASC) 5 mg tablet, Take 1 tablet (5 mg total) by mouth daily, Disp: 90 tablet, Rfl: 3  •  simvastatin (ZOCOR) 20 mg tablet, take one tablet (20mg total) by mouth daily at bedtime. , Disp: 90 tablet, Rfl: 3  •  tamsulosin (FLOMAX) 0.4 mg, Take 1 capsule (0.4 mg total) by mouth daily with dinner, Disp: 30 capsule, Rfl: 0  •  valsartan (DIOVAN) 80 mg tablet, TAKE ONE TABLET BY MOUTH EVERY DAY, Disp: 90 tablet, Rfl: 0      Active Problems     Patient Active Problem List   Diagnosis   • Well adult exam   • Screen for colon cancer   • Screening for diabetes mellitus   • Screening for cardiovascular condition   • Screening for deficiency anemia   • Thyroid disorder screen   • Screening for prostate cancer   • Umbilical hernia without obstruction and without gangrene   • Hyperlipidemia   • Grade II hemorrhoids   • Dyspepsia   • History of Lyme disease   • Hypertension   • Arthralgia   • Insomnia         Past Medical History     Past Medical History:   Diagnosis Date   • Hyperlipidemia    • Hypertension          Surgical History     Past Surgical History:   Procedure Laterality Date   • HEMORRHOID SURGERY     • HERNIA REPAIR     • [place] COLONOSCOPY FLX DX W/COLLJ SPEC WHEN PFRMD N/A 10/2/2018    Procedure: COLONOSCOPY;  Surgeon: Hayden Martinez MD;  Location: [facility] GI LAB; Service: Gastroenterology         Family History     Family History   Problem Relation Age of Onset   • Hypertension Mother    • Hyperlipidemia Mother    • Hyperlipidemia Father    • No Known Problems Brother          Social History     Social History     Social History     Tobacco Use   Smoking Status Never   Smokeless Tobacco Never         Pertinent Lab Values     Lab Results   Component Value Date    CREATININE 0.95 07/05/2022       Lab Results   Component Value Date    PSA 0.3 12/09/2021    PSA 0.2 07/14/2018         Pertinent Imaging     The patient's images were reviewed by me personally and also in real time with them in the examination room using our PACS imaging system.       CTAP w/o 9/22/23: mildly obstructing 7 mm stone R prox ureter; mild R HN, no L HN; BL renal stones      Pertinent Pathology     N/A

## 2023-09-22 NOTE — TELEPHONE ENCOUNTER
----- Message from Willam Gaitan DO sent at 9/22/2023 11:02 AM EDT -----  I spoke to patient. He does have a 7 mm kidney stone in the right ureter. It is causing some mild obstruction. I am going to send a prescription for tamsulosin to the pharmacy. He should push fluids. He will take ibuprofen as needed for pain-he did not want anything stronger. I am also putting in a referral to urology. Please make sure that he is contacted by urology.

## 2023-09-22 NOTE — TELEPHONE ENCOUNTER
Returned call to patient. Reports having pain and discomfort. Also reports some discomfort with urination. Pt denies fever but having sweats. Appt scheduled for today.

## 2023-09-22 NOTE — PATIENT INSTRUCTIONS
Surgery scheduler will call you about timing for surgery    If your urine culture grows bacteria, I will call you to start antibiotic    Come to ED immediately if you have worse pain, fevers, chills, nausea, vomiting

## 2023-09-22 NOTE — TELEPHONE ENCOUNTER
New Patient Triage  Please Triage:   New Patient-   What is the reason for the patients appointment? N20.1 (ICD-10-CM) - Right ureteral stone  N13.2 (ICD-10-CM) - Hydronephrosis with urinary obstruction due to renal calculus    Imaging/Lab Results:   IMPRESSION:     Mildly obstructing 7 mm calculus in the right proximal ureter.     Multiple bilateral intrarenal calculi. Insurance:   Do we accept the pt's insurance or is the patient Self-Pay? Provider & Plan:  [employer]   Member ID#: [id number]  Has the pt had any previous urologist? No     Have pt records been requested? No    Has the pt had any outside testing done? no    Does the pt have a personal history of cancer?: no    Mornings preferred.

## 2023-09-25 ENCOUNTER — TELEPHONE (OUTPATIENT)
Age: 58
End: 2023-09-25

## 2023-09-25 NOTE — TELEPHONE ENCOUNTER
Nelda of DR. Manzano at [facility]      Patient called stating he is to be schedule for surgery right of way. He is having a lot pain and would like to know how to proceed.     Patient can be reached at [phone]

## 2023-09-25 NOTE — TELEPHONE ENCOUNTER
Luz Hung MD  You; Steve Mg DO 17 minutes ago (12:09 PM)       Sorry for not responding earlier. Mele Madrigal happy to the case on 9/29. Chadd Lau MD; Steve Mg DO 3 hours ago (8:34 AM)     [facility] Dr. Milton Connell, just following up to see if I could add this patient to 9/29 @ [address] [facility],[address] - I sent you an email 9/22 as well. Let me know, thanks!        Vladislav Michaels routed conversation to [employer] 3 days ago     DO Emanuel Anthony 3 days ago       Fast track for right ureteroscopy please

## 2023-09-25 NOTE — TELEPHONE ENCOUNTER
Called transferred to me from [facility]. Patient does not want to wait until Friday for surgery and is asking if it can be done Wed. Told him I will reach on to the [address] [place] [address] Nicola call MD to ask - Let him know I will call back when I hear from Dr. Jorge A Mcdowell.

## 2023-09-26 ENCOUNTER — TELEPHONE (OUTPATIENT)
Dept: UROLOGY | Facility: CLINIC | Age: 58
End: 2023-09-26

## 2023-09-26 ENCOUNTER — ANESTHESIA EVENT (EMERGENCY)
Dept: PERIOP | Facility: HOSPITAL | Age: 58
End: 2023-09-26
Payer: COMMERCIAL

## 2023-09-26 ENCOUNTER — HOSPITAL ENCOUNTER (EMERGENCY)
Facility: HOSPITAL | Age: 58
Discharge: HOME | End: 2023-09-26
Attending: EMERGENCY MEDICINE
Payer: COMMERCIAL

## 2023-09-26 ENCOUNTER — NURSE TRIAGE (OUTPATIENT)
Dept: OTHER | Facility: OTHER | Age: 58
End: 2023-09-26

## 2023-09-26 ENCOUNTER — APPOINTMENT (EMERGENCY)
Dept: RADIOLOGY | Facility: HOSPITAL | Age: 58
End: 2023-09-26
Payer: COMMERCIAL

## 2023-09-26 ENCOUNTER — ANESTHESIA (EMERGENCY)
Dept: PERIOP | Facility: HOSPITAL | Age: 58
End: 2023-09-26
Payer: COMMERCIAL

## 2023-09-26 VITALS
WEIGHT: 160 LBS | HEIGHT: 69 IN | OXYGEN SATURATION: 95 % | BODY MASS INDEX: 23.7 KG/M2 | TEMPERATURE: 97.8 F | SYSTOLIC BLOOD PRESSURE: 128 MMHG | DIASTOLIC BLOOD PRESSURE: 79 MMHG | RESPIRATION RATE: 16 BRPM | HEART RATE: 65 BPM

## 2023-09-26 DIAGNOSIS — Z98.890 STATUS POST LASER LITHOTRIPSY OF URETERAL CALCULUS: ICD-10-CM

## 2023-09-26 DIAGNOSIS — N20.0 NEPHROLITHIASIS: Primary | ICD-10-CM

## 2023-09-26 LAB
ANION GAP SERPL CALCULATED.3IONS-SCNC: 6 MMOL/L
BACTERIA UR QL AUTO: NORMAL /HPF
BASOPHILS # BLD AUTO: 0.02 THOUSANDS/ÂΜL (ref 0–0.1)
BASOPHILS NFR BLD AUTO: 0 % (ref 0–1)
BILIRUB UR QL STRIP: NEGATIVE
BUN SERPL-MCNC: 24 MG/DL (ref 5–25)
CALCIUM SERPL-MCNC: 9.7 MG/DL (ref 8.4–10.2)
CHLORIDE SERPL-SCNC: 107 MMOL/L (ref 96–108)
CLARITY UR: CLEAR
CO2 SERPL-SCNC: 27 MMOL/L (ref 21–32)
COLOR UR: COLORLESS
CREAT SERPL-MCNC: 1.02 MG/DL (ref 0.6–1.3)
EOSINOPHIL # BLD AUTO: 0.05 THOUSAND/ÂΜL (ref 0–0.61)
EOSINOPHIL NFR BLD AUTO: 1 % (ref 0–6)
ERYTHROCYTE [DISTWIDTH] IN BLOOD BY AUTOMATED COUNT: 13.2 % (ref 11.6–15.1)
GFR SERPL CREATININE-BSD FRML MDRD: 80 ML/MIN/1.73SQ M
GLUCOSE SERPL-MCNC: 94 MG/DL (ref 65–140)
GLUCOSE UR STRIP-MCNC: NEGATIVE MG/DL
HCT VFR BLD AUTO: 47.2 % (ref 36.5–49.3)
HGB BLD-MCNC: 15.2 G/DL (ref 12–17)
HGB UR QL STRIP.AUTO: NEGATIVE
IMM GRANULOCYTES # BLD AUTO: 0.03 THOUSAND/UL (ref 0–0.2)
IMM GRANULOCYTES NFR BLD AUTO: 0 % (ref 0–2)
KETONES UR STRIP-MCNC: NEGATIVE MG/DL
LEUKOCYTE ESTERASE UR QL STRIP: ABNORMAL
LYMPHOCYTES # BLD AUTO: 1.01 THOUSANDS/ÂΜL (ref 0.6–4.47)
LYMPHOCYTES NFR BLD AUTO: 11 % (ref 14–44)
MCH RBC QN AUTO: 30.2 PG (ref 26.8–34.3)
MCHC RBC AUTO-ENTMCNC: 32.2 G/DL (ref 31.4–37.4)
MCV RBC AUTO: 94 FL (ref 82–98)
MONOCYTES # BLD AUTO: 0.76 THOUSAND/ÂΜL (ref 0.17–1.22)
MONOCYTES NFR BLD AUTO: 9 % (ref 4–12)
NEUTROPHILS # BLD AUTO: 6.99 THOUSANDS/ÂΜL (ref 1.85–7.62)
NEUTS SEG NFR BLD AUTO: 79 % (ref 43–75)
NITRITE UR QL STRIP: NEGATIVE
NON-SQ EPI CELLS URNS QL MICRO: NORMAL /HPF
NRBC BLD AUTO-RTO: 0 /100 WBCS
PH UR STRIP.AUTO: 6.5 [PH]
PLATELET # BLD AUTO: 236 THOUSANDS/UL (ref 149–390)
PMV BLD AUTO: 9.8 FL (ref 8.9–12.7)
POTASSIUM SERPL-SCNC: 3.8 MMOL/L (ref 3.5–5.3)
PROT UR STRIP-MCNC: NEGATIVE MG/DL
RBC # BLD AUTO: 5.03 MILLION/UL (ref 3.88–5.62)
RBC #/AREA URNS AUTO: NORMAL /HPF
SODIUM SERPL-SCNC: 140 MMOL/L (ref 135–147)
SP GR UR STRIP.AUTO: 1.01 (ref 1–1.03)
UROBILINOGEN UR STRIP-ACNC: <2 MG/DL
WBC # BLD AUTO: 8.86 THOUSAND/UL (ref 4.31–10.16)
WBC #/AREA URNS AUTO: NORMAL /HPF

## 2023-09-26 PROCEDURE — 36415 COLL VENOUS BLD VENIPUNCTURE: CPT

## 2023-09-26 PROCEDURE — C1747 URETEROSCOPE DIGITAL FLEX SNGL USE RVS DEFLECTION APTRA: HCPCS | Performed by: UROLOGY

## 2023-09-26 PROCEDURE — C2617 STENT, NON-COR, TEM W/O DEL: HCPCS | Performed by: UROLOGY

## 2023-09-26 PROCEDURE — 96372 THER/PROPH/DIAG INJ SC/IM: CPT

## 2023-09-26 PROCEDURE — 81001 URINALYSIS AUTO W/SCOPE: CPT

## 2023-09-26 PROCEDURE — 96375 TX/PRO/DX INJ NEW DRUG ADDON: CPT

## 2023-09-26 PROCEDURE — C1758 CATHETER, URETERAL: HCPCS | Performed by: UROLOGY

## 2023-09-26 PROCEDURE — 96361 HYDRATE IV INFUSION ADD-ON: CPT

## 2023-09-26 PROCEDURE — 96374 THER/PROPH/DIAG INJ IV PUSH: CPT

## 2023-09-26 PROCEDURE — 85025 COMPLETE CBC W/AUTO DIFF WBC: CPT

## 2023-09-26 PROCEDURE — 99285 EMERGENCY DEPT VISIT HI MDM: CPT | Performed by: EMERGENCY MEDICINE

## 2023-09-26 PROCEDURE — C1894 INTRO/SHEATH, NON-LASER: HCPCS | Performed by: UROLOGY

## 2023-09-26 PROCEDURE — 99215 OFFICE O/P EST HI 40 MIN: CPT | Performed by: UROLOGY

## 2023-09-26 PROCEDURE — 52356 CYSTO/URETERO W/LITHOTRIPSY: CPT | Performed by: UROLOGY

## 2023-09-26 PROCEDURE — 74018 RADEX ABDOMEN 1 VIEW: CPT

## 2023-09-26 PROCEDURE — 99284 EMERGENCY DEPT VISIT MOD MDM: CPT

## 2023-09-26 PROCEDURE — 80048 BASIC METABOLIC PNL TOTAL CA: CPT

## 2023-09-26 PROCEDURE — C1769 GUIDE WIRE: HCPCS | Performed by: UROLOGY

## 2023-09-26 DEVICE — INLAY OPTIMA URETERAL STENT W/O GUIDEWIRE
Type: IMPLANTABLE DEVICE | Site: URETER | Status: FUNCTIONAL
Brand: BARD® INLAY OPTIMA® URETERAL STENT

## 2023-09-26 RX ORDER — CEFAZOLIN SODIUM 2 G/50ML
2000 SOLUTION INTRAVENOUS
Status: COMPLETED | OUTPATIENT
Start: 2023-09-26 | End: 2023-09-26

## 2023-09-26 RX ORDER — FENTANYL CITRATE 50 UG/ML
50 INJECTION, SOLUTION INTRAMUSCULAR; INTRAVENOUS
Status: DISCONTINUED | OUTPATIENT
Start: 2023-09-26 | End: 2023-09-26 | Stop reason: HOSPADM

## 2023-09-26 RX ORDER — TAMSULOSIN HYDROCHLORIDE 0.4 MG/1
0.4 CAPSULE ORAL
Status: DISCONTINUED | OUTPATIENT
Start: 2023-09-26 | End: 2023-09-26 | Stop reason: HOSPADM

## 2023-09-26 RX ORDER — KETOROLAC TROMETHAMINE 30 MG/ML
INJECTION, SOLUTION INTRAMUSCULAR; INTRAVENOUS AS NEEDED
Status: DISCONTINUED | OUTPATIENT
Start: 2023-09-26 | End: 2023-09-26

## 2023-09-26 RX ORDER — HYDROCODONE BITARTRATE AND ACETAMINOPHEN 5; 325 MG/1; MG/1
1-2 TABLET ORAL EVERY 6 HOURS PRN
Qty: 5 TABLET | Refills: 0 | Status: CANCELLED | OUTPATIENT
Start: 2023-09-26

## 2023-09-26 RX ORDER — KETOROLAC TROMETHAMINE 30 MG/ML
15 INJECTION, SOLUTION INTRAMUSCULAR; INTRAVENOUS ONCE
Status: COMPLETED | OUTPATIENT
Start: 2023-09-26 | End: 2023-09-26

## 2023-09-26 RX ORDER — CEPHALEXIN 500 MG/1
500 CAPSULE ORAL EVERY 6 HOURS SCHEDULED
Qty: 12 CAPSULE | Refills: 0 | Status: SHIPPED | OUTPATIENT
Start: 2023-09-26 | End: 2023-09-29

## 2023-09-26 RX ORDER — FENTANYL CITRATE 50 UG/ML
INJECTION, SOLUTION INTRAMUSCULAR; INTRAVENOUS AS NEEDED
Status: DISCONTINUED | OUTPATIENT
Start: 2023-09-26 | End: 2023-09-26

## 2023-09-26 RX ORDER — LIDOCAINE HYDROCHLORIDE 20 MG/ML
INJECTION, SOLUTION EPIDURAL; INFILTRATION; INTRACAUDAL; PERINEURAL AS NEEDED
Status: DISCONTINUED | OUTPATIENT
Start: 2023-09-26 | End: 2023-09-26

## 2023-09-26 RX ORDER — ONDANSETRON 2 MG/ML
4 INJECTION INTRAMUSCULAR; INTRAVENOUS EVERY 6 HOURS PRN
Status: DISCONTINUED | OUTPATIENT
Start: 2023-09-26 | End: 2023-09-26 | Stop reason: HOSPADM

## 2023-09-26 RX ORDER — ACETAMINOPHEN 325 MG/1
650 TABLET ORAL EVERY 4 HOURS PRN
Status: DISCONTINUED | OUTPATIENT
Start: 2023-09-26 | End: 2023-09-26 | Stop reason: HOSPADM

## 2023-09-26 RX ORDER — PHENAZOPYRIDINE HYDROCHLORIDE 200 MG/1
200 TABLET, FILM COATED ORAL ONCE
Status: CANCELLED | OUTPATIENT
Start: 2023-09-26 | End: 2023-09-26

## 2023-09-26 RX ORDER — HYDROCODONE BITARTRATE AND ACETAMINOPHEN 5; 325 MG/1; MG/1
1 TABLET ORAL EVERY 6 HOURS PRN
Status: DISCONTINUED | OUTPATIENT
Start: 2023-09-26 | End: 2023-09-26 | Stop reason: HOSPADM

## 2023-09-26 RX ORDER — SODIUM CHLORIDE 9 MG/ML
125 INJECTION, SOLUTION INTRAVENOUS CONTINUOUS
Status: DISCONTINUED | OUTPATIENT
Start: 2023-09-26 | End: 2023-09-26 | Stop reason: HOSPADM

## 2023-09-26 RX ORDER — EPHEDRINE SULFATE 50 MG/ML
INJECTION INTRAVENOUS AS NEEDED
Status: DISCONTINUED | OUTPATIENT
Start: 2023-09-26 | End: 2023-09-26

## 2023-09-26 RX ORDER — HYDROMORPHONE HCL/PF 1 MG/ML
0.5 SYRINGE (ML) INJECTION EVERY 4 HOURS PRN
Status: DISCONTINUED | OUTPATIENT
Start: 2023-09-26 | End: 2023-09-26 | Stop reason: HOSPADM

## 2023-09-26 RX ORDER — OXYCODONE HYDROCHLORIDE 10 MG/1
10 TABLET ORAL EVERY 4 HOURS PRN
Status: DISCONTINUED | OUTPATIENT
Start: 2023-09-26 | End: 2023-09-26 | Stop reason: HOSPADM

## 2023-09-26 RX ORDER — MAGNESIUM HYDROXIDE 1200 MG/15ML
LIQUID ORAL AS NEEDED
Status: DISCONTINUED | OUTPATIENT
Start: 2023-09-26 | End: 2023-09-26 | Stop reason: HOSPADM

## 2023-09-26 RX ORDER — PHENAZOPYRIDINE HYDROCHLORIDE 200 MG/1
200 TABLET, FILM COATED ORAL 3 TIMES DAILY PRN
Qty: 10 TABLET | Refills: 0 | Status: SHIPPED | OUTPATIENT
Start: 2023-09-26

## 2023-09-26 RX ORDER — DOCUSATE SODIUM 100 MG/1
100 CAPSULE, LIQUID FILLED ORAL 2 TIMES DAILY
Status: DISCONTINUED | OUTPATIENT
Start: 2023-09-26 | End: 2023-09-26 | Stop reason: HOSPADM

## 2023-09-26 RX ORDER — CEPHALEXIN 500 MG/1
500 CAPSULE ORAL EVERY 6 HOURS SCHEDULED
Qty: 12 CAPSULE | Refills: 0 | Status: CANCELLED | OUTPATIENT
Start: 2023-09-26 | End: 2023-09-29

## 2023-09-26 RX ORDER — KETOROLAC TROMETHAMINE 10 MG/1
10 TABLET, FILM COATED ORAL EVERY 6 HOURS PRN
Qty: 20 TABLET | Refills: 0 | Status: CANCELLED | OUTPATIENT
Start: 2023-09-26 | End: 2023-10-01

## 2023-09-26 RX ORDER — PROPOFOL 10 MG/ML
INJECTION, EMULSION INTRAVENOUS AS NEEDED
Status: DISCONTINUED | OUTPATIENT
Start: 2023-09-26 | End: 2023-09-26

## 2023-09-26 RX ORDER — OXYCODONE HYDROCHLORIDE 5 MG/1
5 TABLET ORAL EVERY 4 HOURS PRN
Status: DISCONTINUED | OUTPATIENT
Start: 2023-09-26 | End: 2023-09-26 | Stop reason: HOSPADM

## 2023-09-26 RX ORDER — DEXAMETHASONE SODIUM PHOSPHATE 4 MG/ML
INJECTION, SOLUTION INTRA-ARTICULAR; INTRALESIONAL; INTRAMUSCULAR; INTRAVENOUS; SOFT TISSUE AS NEEDED
Status: DISCONTINUED | OUTPATIENT
Start: 2023-09-26 | End: 2023-09-26

## 2023-09-26 RX ORDER — ONDANSETRON 2 MG/ML
INJECTION INTRAMUSCULAR; INTRAVENOUS AS NEEDED
Status: DISCONTINUED | OUTPATIENT
Start: 2023-09-26 | End: 2023-09-26

## 2023-09-26 RX ORDER — PHENAZOPYRIDINE HYDROCHLORIDE 200 MG/1
200 TABLET, FILM COATED ORAL ONCE
Status: COMPLETED | OUTPATIENT
Start: 2023-09-26 | End: 2023-09-26

## 2023-09-26 RX ORDER — HYDROCODONE BITARTRATE AND ACETAMINOPHEN 5; 325 MG/1; MG/1
1-2 TABLET ORAL EVERY 6 HOURS PRN
Qty: 5 TABLET | Refills: 0 | Status: SHIPPED | OUTPATIENT
Start: 2023-09-26

## 2023-09-26 RX ORDER — PHENAZOPYRIDINE HYDROCHLORIDE 200 MG/1
200 TABLET, FILM COATED ORAL 3 TIMES DAILY PRN
Qty: 10 TABLET | Refills: 0 | Status: CANCELLED | OUTPATIENT
Start: 2023-09-26

## 2023-09-26 RX ORDER — HYDROCODONE BITARTRATE AND ACETAMINOPHEN 5; 325 MG/1; MG/1
1 TABLET ORAL EVERY 6 HOURS PRN
Status: CANCELLED | OUTPATIENT
Start: 2023-09-26

## 2023-09-26 RX ORDER — HEPARIN SODIUM 5000 [USP'U]/ML
5000 INJECTION, SOLUTION INTRAVENOUS; SUBCUTANEOUS EVERY 8 HOURS SCHEDULED
Status: DISCONTINUED | OUTPATIENT
Start: 2023-09-26 | End: 2023-09-26 | Stop reason: HOSPADM

## 2023-09-26 RX ORDER — MIDAZOLAM HYDROCHLORIDE 2 MG/2ML
INJECTION, SOLUTION INTRAMUSCULAR; INTRAVENOUS AS NEEDED
Status: DISCONTINUED | OUTPATIENT
Start: 2023-09-26 | End: 2023-09-26

## 2023-09-26 RX ADMIN — EPHEDRINE SULFATE 5 MG: 50 INJECTION, SOLUTION INTRAVENOUS at 14:48

## 2023-09-26 RX ADMIN — FENTANYL CITRATE 50 MCG: 50 INJECTION INTRAMUSCULAR; INTRAVENOUS at 14:29

## 2023-09-26 RX ADMIN — CEFAZOLIN SODIUM 2000 MG: 2 SOLUTION INTRAVENOUS at 14:25

## 2023-09-26 RX ADMIN — PROPOFOL 200 MG: 10 INJECTION, EMULSION INTRAVENOUS at 14:31

## 2023-09-26 RX ADMIN — FENTANYL CITRATE 25 MCG: 50 INJECTION INTRAMUSCULAR; INTRAVENOUS at 15:07

## 2023-09-26 RX ADMIN — KETOROLAC TROMETHAMINE 15 MG: 30 INJECTION, SOLUTION INTRAMUSCULAR at 15:06

## 2023-09-26 RX ADMIN — KETOROLAC TROMETHAMINE 15 MG: 30 INJECTION, SOLUTION INTRAMUSCULAR; INTRAVENOUS at 07:06

## 2023-09-26 RX ADMIN — PHENAZOPYRIDINE 200 MG: 200 TABLET ORAL at 17:07

## 2023-09-26 RX ADMIN — LIDOCAINE HYDROCHLORIDE 50 MG: 20 INJECTION, SOLUTION EPIDURAL; INFILTRATION; INTRACAUDAL at 14:31

## 2023-09-26 RX ADMIN — DOCUSATE SODIUM 100 MG: 100 CAPSULE, LIQUID FILLED ORAL at 11:14

## 2023-09-26 RX ADMIN — FENTANYL CITRATE 25 MCG: 50 INJECTION INTRAMUSCULAR; INTRAVENOUS at 14:39

## 2023-09-26 RX ADMIN — MIDAZOLAM 2 MG: 1 INJECTION INTRAMUSCULAR; INTRAVENOUS at 14:25

## 2023-09-26 RX ADMIN — EPHEDRINE SULFATE 5 MG: 50 INJECTION, SOLUTION INTRAVENOUS at 14:54

## 2023-09-26 RX ADMIN — ONDANSETRON 4 MG: 2 INJECTION INTRAMUSCULAR; INTRAVENOUS at 14:36

## 2023-09-26 RX ADMIN — SODIUM CHLORIDE: 0.9 INJECTION, SOLUTION INTRAVENOUS at 14:40

## 2023-09-26 RX ADMIN — HEPARIN SODIUM 5000 UNITS: 5000 INJECTION INTRAVENOUS; SUBCUTANEOUS at 11:14

## 2023-09-26 RX ADMIN — DEXAMETHASONE SODIUM PHOSPHATE 10 MG: 4 INJECTION INTRA-ARTICULAR; INTRALESIONAL; INTRAMUSCULAR; INTRAVENOUS; SOFT TISSUE at 14:36

## 2023-09-26 RX ADMIN — SODIUM CHLORIDE 125 ML/HR: 0.9 INJECTION, SOLUTION INTRAVENOUS at 11:13

## 2023-09-26 NOTE — ANESTHESIA POSTPROCEDURE EVALUATION
Post-Op Assessment Note    CV Status:  Stable  Pain Score: 1    Pain management: adequate     Mental Status:  Alert and awake   Hydration Status:  Euvolemic   PONV Controlled:  Controlled   Airway Patency:  Patent      Post Op Vitals Reviewed: Yes      Staff: Anesthesiologist         No notable events documented.     BP      Temp      Pulse     Resp      /79   Pulse 65   Temp 97.8 °F (36.6 °C) (Temporal)   Resp 16   Ht 5' 9" (1.753 m)   Wt 72.6 kg (160 lb)   SpO2 95%   BMI 23.63 kg/m²

## 2023-09-26 NOTE — ED PROVIDER NOTES
History  Chief Complaint   Patient presents with   • Flank Pain     Right flank pain, comes and goes, has a 7mm kidney stone, surgery scheduled for 9/29. Taking tylenol and motrin with some relief     59-year-old male presents to ED for evaluation intermittent right flank pain x6 days. Patient states the pain is dull, is colicky, and currently is 2 out of 10 in severity. Patient states he has been having increasing episodes of worsening pain, with 4 episodes over the last 12 hours, and the last episode lasted approximately 1 hour. Patient was initially taking Motrin and Tylenol at home but without relief so he stopped taking them. Patient was also placed on tamsulosin daily, which she took today. Patient was evaluated by his primary care provider last week for this problem. He received a CT abdomen pelvis on 9/22/2023 which showed Mildly obstructing 7 mm calculus in the right proximal ureter and multiple bilateral intrarenal calculi. He was evaluated by urologist Dr. Simin Martin on 9/2021/25 and was scheduled for surgery on 9/29/2023. Patient states that initially he could tolerate his pain, but it is worsening and increasing in frequency. Patient states that he has had intermittent lightheadedness and nausea with the worsening episodes. He currently does not feel nauseated or lightheaded. Denies vomiting, fever, chills, urinary problems, dysuria, hematuria. Prior to Admission Medications   Prescriptions Last Dose Informant Patient Reported? Taking? amLODIPine (NORVASC) 5 mg tablet  Self No Yes   Sig: Take 1 tablet (5 mg total) by mouth daily   simvastatin (ZOCOR) 20 mg tablet  Self No Yes   Sig: take one tablet (20mg total) by mouth daily at bedtime.    tamsulosin (FLOMAX) 0.4 mg  Self No Yes   Sig: Take 1 capsule (0.4 mg total) by mouth daily with dinner   valsartan (DIOVAN) 80 mg tablet  Self No Yes   Sig: TAKE ONE TABLET BY MOUTH EVERY DAY      Facility-Administered Medications: None Past Medical History:   Diagnosis Date   • Hyperlipidemia    • Hypertension        Past Surgical History:   Procedure Laterality Date   • HEMORRHOID SURGERY     • HERNIA REPAIR     • AL COLONOSCOPY FLX DX W/COLLJ SPEC WHEN PFRMD N/A 10/2/2018    Procedure: COLONOSCOPY;  Surgeon: Raven Beavers MD;  Location: [facility]; Service: Gastroenterology       Family History   Problem Relation Age of Onset   • Hypertension Mother    • Hyperlipidemia Mother    • Hyperlipidemia Father    • No Known Problems Brother      I have reviewed and agree with the history as documented. E-Cigarette/Vaping   • E-Cigarette Use Never User      E-Cigarette/Vaping Substances     Social History     Tobacco Use   • Smoking status: Never   • Smokeless tobacco: Never   Vaping Use   • Vaping Use: Never used   Substance Use Topics   • Alcohol use: Yes     Comment: 1 or 2 drinks once or twice a month   • Drug use: No        Review of Systems   Constitutional: Negative for chills and fever. HENT: Negative for ear pain and sore throat. Eyes: Negative for pain and visual disturbance. Respiratory: Negative for cough and shortness of breath. Cardiovascular: Negative for chest pain and palpitations. Gastrointestinal: Positive for nausea (Resolved). Negative for abdominal pain and vomiting. Genitourinary: Positive for flank pain (Right flank). Negative for difficulty urinating, dysuria, hematuria and urgency. Musculoskeletal: Negative for arthralgias, back pain and neck pain. Skin: Negative for color change, rash and wound. Neurological: Positive for light-headedness (Resolved). Negative for dizziness, seizures, syncope, weakness and headaches. Psychiatric/Behavioral: Negative for agitation, behavioral problems and confusion. All other systems reviewed and are negative.       Physical Exam  ED Triage Vitals   Temperature Pulse Respirations Blood Pressure SpO2   09/26/23 0540 09/26/23 0540 09/26/23 0540 09/26/23 0540 09/26/23 0540   98 °F (36.7 °C) 91 19 133/96 98 %      Temp Source Heart Rate Source Patient Position - Orthostatic VS BP Location FiO2 (%)   09/26/23 0540 09/26/23 0540 09/26/23 1021 09/26/23 1021 --   Oral Monitor Lying Right arm       Pain Score       09/26/23 0540       3             Orthostatic Vital Signs  Vitals:    09/26/23 0737 09/26/23 1021 09/26/23 1213 09/26/23 1336   BP: 122/84 135/95 124/95 116/84   Pulse: 59 58 58 (!) 54   Patient Position - Orthostatic VS:  Lying Lying Lying       Physical Exam  Vitals and nursing note reviewed. Constitutional:       General: He is not in acute distress. Appearance: Normal appearance. He is well-developed and normal weight. He is not toxic-appearing. HENT:      Head: Normocephalic and atraumatic. Mouth/Throat:      Mouth: Mucous membranes are moist.   Eyes:      Conjunctiva/sclera: Conjunctivae normal.   Cardiovascular:      Rate and Rhythm: Normal rate and regular rhythm. Pulses: Normal pulses. Heart sounds: Normal heart sounds. Pulmonary:      Effort: Pulmonary effort is normal. No respiratory distress. Breath sounds: Normal breath sounds. No wheezing. Abdominal:      General: There is no distension. Palpations: Abdomen is soft. Tenderness: There is no abdominal tenderness. There is right CVA tenderness (Mild). There is no left CVA tenderness, guarding or rebound. Musculoskeletal:         General: No swelling. Cervical back: Neck supple. Right lower leg: No edema. Left lower leg: No edema. Skin:     General: Skin is warm and dry. Capillary Refill: Capillary refill takes less than 2 seconds. Neurological:      Mental Status: He is alert and oriented to person, place, and time.    Psychiatric:         Mood and Affect: Mood normal.         ED Medications  Medications   ceFAZolin (ANCEF) IVPB (premix in dextrose) 2,000 mg 50 mL (has no administration in time range)   sodium chloride 0.9 % infusion (125 mL/hr Intravenous New Bag 9/26/23 1113)   acetaminophen (TYLENOL) tablet 650 mg (has no administration in time range)   docusate sodium (COLACE) capsule 100 mg (100 mg Oral Given 9/26/23 1114)   ondansetron (ZOFRAN) injection 4 mg (has no administration in time range)   heparin (porcine) subcutaneous injection 5,000 Units (5,000 Units Subcutaneous Given 9/26/23 1114)   tamsulosin (FLOMAX) capsule 0.4 mg (has no administration in time range)   oxyCODONE (ROXICODONE) IR tablet 5 mg (has no administration in time range)   oxyCODONE (ROXICODONE) immediate release tablet 10 mg (has no administration in time range)   HYDROmorphone (DILAUDID) injection 0.5 mg (has no administration in time range)   ketorolac (TORADOL) injection 15 mg (15 mg Intravenous Given 9/26/23 0706)       Diagnostic Studies  Results Reviewed     Procedure Component Value Units Date/Time    Platelet count [[id number]]     Lab Status: No result Specimen: Blood     Basic metabolic panel [[id number]] Collected: 09/26/23 0709    Lab Status: Final result Specimen: Blood from Arm, Right Updated: 09/26/23 0741     Sodium 140 mmol/L      Potassium 3.8 mmol/L      Chloride 107 mmol/L      CO2 27 mmol/L      ANION GAP 6 mmol/L      BUN 24 mg/dL      Creatinine 1.02 mg/dL      Glucose 94 mg/dL      Calcium 9.7 mg/dL      eGFR 80 ml/min/1.73sq m     Narrative:      [place] guidelines for Chronic Kidney Disease (CKD):   •  Stage 1 with normal or high GFR (GFR > 90 mL/min/1.73 square meters)  •  Stage 2 Mild CKD (GFR = 60-89 mL/min/1.73 square meters)  •  Stage 3A Moderate CKD (GFR = 45-59 mL/min/1.73 square meters)  •  Stage 3B Moderate CKD (GFR = 30-44 mL/min/1.73 square meters)  •  Stage 4 Severe CKD (GFR = 15-29 mL/min/1.73 square meters)  •  Stage 5 End Stage CKD (GFR <15 mL/min/1.73 square meters)  Note: GFR calculation is accurate only with a steady state creatinine    Urine Microscopic [[id number]]  (Normal) Collected: 09/26/23 9668 Lab Status: Final result Specimen: Urine, Clean Catch Updated: 09/26/23 0736     RBC, UA 1-2 /hpf      WBC, UA 1-2 /hpf      Epithelial Cells None Seen /hpf      Bacteria, UA None Seen /hpf     UA w Reflex to Microscopic w Reflex to Culture [[id number]]  (Abnormal) Collected: 09/26/23 0712    Lab Status: Final result Specimen: Urine, Clean Catch Updated: 09/26/23 0735     Color, UA Colorless     Clarity, UA Clear     Specific Gravity, UA 1.007     pH, UA 6.5     Leukocytes, UA Trace     Nitrite, UA Negative     Protein, UA Negative mg/dl      Glucose, UA Negative mg/dl      Ketones, UA Negative mg/dl      Urobilinogen, UA <2.0 mg/dl      Bilirubin, UA Negative     Occult Blood, UA Negative    CBC and differential [[id number]]  (Abnormal) Collected: 09/26/23 0709    Lab Status: Final result Specimen: Blood from Arm, Right Updated: 09/26/23 0717     WBC 8.86 Thousand/uL      RBC 5.03 Million/uL      Hemoglobin 15.2 g/dL      Hematocrit 47.2 %      MCV 94 fL      MCH 30.2 pg      MCHC 32.2 g/dL      RDW 13.2 %      MPV 9.8 fL      Platelets 436 Thousands/uL      nRBC 0 /100 WBCs      Neutrophils Relative 79 %      Immat GRANS % 0 %      Lymphocytes Relative 11 %      Monocytes Relative 9 %      Eosinophils Relative 1 %      Basophils Relative 0 %      Neutrophils Absolute 6.99 Thousands/µL      Immature Grans Absolute 0.03 Thousand/uL      Lymphocytes Absolute 1.01 Thousands/µL      Monocytes Absolute 0.76 Thousand/µL      Eosinophils Absolute 0.05 Thousand/µL      Basophils Absolute 0.02 Thousands/µL                  FL < 1 hour    (Results Pending)         Procedures  Procedures      ED Course  ED Course as of 09/26/23 1347   Tue Sep 26, 2023   0721 WBC: 8.86                             SBIRT 22yo+    Flowsheet Row Most Recent Value   Initial Alcohol Screen: US AUDIT-C     1. How often do you have a drink containing alcohol? 0 Filed at: 09/26/2023 0500   2.  How many drinks containing alcohol do you have on a typical day you are drinking? 0 Filed at: 09/26/2023 0540   3a. Male UNDER 65: How often do you have five or more drinks on one occasion? 0 Filed at: 09/26/2023 0540   3b. FEMALE Any Age, or MALE 65+: How often do you have 4 or more drinks on one occassion? 0 Filed at: 09/26/2023 0540   Audit-C Score 0 Filed at: 09/26/2023 0540   LULY: How many times in the past year have you. .. Used an illegal drug or used a prescription medication for non-medical reasons? Never Filed at: 09/26/2023 Ankit Sosa is a 62 y.o. male who presents to ED for evaluation of intermittent R flank pain x 6 days. Diagnosed with 7 mm stone of proximal ureter on right. Scheduled for procedure on 9/29/23 with urology. Patient came in today because his pain is not controlled at home. +intermittent nausea and lightheadedness. Denies fever, chills, vomiting, urinary problems, hematuria. Physical exam: Vitals stable. Afebrile. No acute distress, nontoxic appearing. Heart RRR. Lungs CTAB. Mild R flank pain to palpation. Abd soft, nontender. Differential diagnoses include: Nephrolithiasis, I doubt pyelonephritis    Workup includes: toradol for pain, BMP, CBC, UA    White blood cell count normal.  BUN and creatinine normal.  No signs of infection. Urinalysis normal.  Case discussed with Urology. Pt will be admitted to the urology service to have his procedure as an add on later today: Cystoscopy ureteroscopy with lithotripsy. Will follow up and continue to monitor. Please see ED Course for additional information. Amount and/or Complexity of Data Reviewed  Labs: ordered. Decision-making details documented in ED Course. Risk  Prescription drug management. Decision regarding hospitalization.             Disposition  Final diagnoses:   Nephrolithiasis     Time reflects when diagnosis was documented in both MDM as applicable and the Disposition within this note     Time User Action Codes Description Comment    9/26/2023  8:08 AM Kyle Vizcaino Add [N20.0] Nephrolithiasis       ED Disposition     ED Disposition   Admit    Condition   Stable    Date/Time   Tue Sep 26, 2023  9:25 AM    Comment   Case was discussed with Urology and the patient's admission status was agreed to be Admission Status: inpatient status to the service of Dr. Verner Fabian . Follow-up Information    None         Patient's Medications   Discharge Prescriptions    No medications on file     No discharge procedures on file. PDMP Review       Value Time User    PDMP Reviewed  Yes 7/11/2022  9:12 AM Ananya Rodrigues DO           ED Provider  Attending physically available and evaluated Samanta Wang. I managed the patient along with the ED Attending.     Electronically Signed by         Kyle Vizcaino DO  09/26/23 9252

## 2023-09-26 NOTE — ED NOTES
Pt aware all urine needs to be strained. Urinal and strainer at bedside.       [facility]  09/26/23 7740

## 2023-09-26 NOTE — INTERVAL H&P NOTE
H&P reviewed. After examining the patient I find no changes in the patients condition since the H&P had been written.     Vitals:    09/26/23 1336   BP: 116/84   Pulse: (!) 54   Resp: 16   Temp:    SpO2: 98%

## 2023-09-26 NOTE — ANESTHESIA PREPROCEDURE EVALUATION
Procedure:  CYSTOSCOPY URETEROSCOPY WITH LITHOTRIPSY HOLMIUM LASER, RETROGRADE PYELOGRAM AND INSERTION STENT URETERAL (Right: Ureter)    Relevant Problems   ANESTHESIA (within normal limits)      CARDIO   (+) Hyperlipidemia   (+) Hypertension      /RENAL   (+) Nephrolithiasis      MUSCULOSKELETAL (within normal limits)      NEURO/PSYCH (within normal limits)      PULMONARY (within normal limits)      Other   (+) Dyspepsia        Physical Exam    Airway    Mallampati score: II         Dental   No notable dental hx     Cardiovascular  Rhythm: regular, Rate: normal, Cardiovascular exam normal    Pulmonary  Pulmonary exam normal Breath sounds clear to auscultation,     Other Findings        Anesthesia Plan  ASA Score- 2     Anesthesia Type- general with ASA Monitors. Additional Monitors:   Airway Plan:           Plan Factors-    Chart reviewed. Existing labs reviewed. Patient summary reviewed. Patient is not a current smoker. Induction- intravenous. Postoperative Plan-     Informed Consent- Anesthetic plan and risks discussed with patient.

## 2023-09-26 NOTE — DISCHARGE INSTR - AVS FIRST PAGE
Expect to see blood in the urine, and to experience urgency/frequency/burning with urination and dribbling. This is normal after urological procedures. Is also normal to experience some nausea after these procedures. Go back your regular diet carefully. It is normal to feel pain in the kidney when urinating and when the bladder is filling due to urine refluxing up to the kidney because of the open stent. The stent is necessary to keep the ureter open to allow clots and swelling to resolve and to allow the kidney to drain properly after instrumentation. Some stones may pass around the stent, but most stones pass after the stent is removed. The presence of the stent makes the ureter wider while it is in and after has been removed, allowing passage of larger fragments. The stones on your right side were broken up completely and you will pass the small fragments. The procedure went well. Our office will call you to arrange stent removal in a week or you can do this at home. She is in 6 months with a renal ultrasound. Call for fever greater that 101.5, inability to urinate, prolonged nausea and vomiting, or severe pain not relieved by pain medications. Cr Gomez Keep stent string taped- if it becomes dislodged or gets pulled out early, that is OK- simply remove it completely by pulling on string until it is completely out. No driving/operating machinery for 24 hours, and while taking narcotics. Take over the counter remedy of choice to avoid constipation. Drink plenty of fluids.

## 2023-09-26 NOTE — H&P
HISTORY AND PHYSICAL      Patient Name: Daniela Helton  Patient MRN:  [id number]  Admission Date:  9/26/2023  5:35 AM  Attending Provider:  Meagan Mills*  Service: Urology                               Chief Complaint  Flank pain    HPI   Source:the patient  Daniela Helton Is a 51-year-old male recently evaluated in office for right flank pain found to have right ureteral calculus. He was scheduled for definitive stone treatment electively at [facility] 9/29. Presenting to [address] [place] [address] due to intractable pain not accompanied by fever, chills, dysuria but positive intermittent hematuria and nausea. He is afebrile, hemodynamically stable without acute kidney injury or leukocytosis on emergency room lab evaluation. We were contacted by the emergency room provider after CT re-demonstrated 7 mm right proximal ureteral calculus and resulting hydronephrosis. Review of Systems   Constitutional: Negative for chills and fever. HENT: Negative. Eyes: Negative. Respiratory: Negative. Cardiovascular: Negative. Gastrointestinal: Positive for abdominal pain and nausea. Negative for vomiting. Endocrine: Negative. Genitourinary: Positive for flank pain and hematuria. Negative for difficulty urinating, dysuria, penile discharge, penile pain, penile swelling, scrotal swelling, testicular pain and urgency. Skin: Negative. Allergic/Immunologic: Negative. Neurological: Negative. Hematological: Negative. Psychiatric/Behavioral: Negative. Chart Review   The statement should read: The following portions of the patient’s history were reviewed and updated as appropriate:   He  has a past medical history of Hyperlipidemia and Hypertension.   He   Patient Active Problem List    Diagnosis Date Noted   • Nephrolithiasis 09/26/2023   • Insomnia 07/11/2022   • Arthralgia 12/20/2021   • Hypertension 12/08/2021   • History of Lyme disease 07/08/2021   • Grade II hemorrhoids 08/31/2018   • Dyspepsia 47/48/6453   • Umbilical hernia without obstruction and without gangrene 07/17/2018   • Hyperlipidemia 07/17/2018   • Well adult exam 07/03/2018   • Screen for colon cancer 07/03/2018   • Screening for diabetes mellitus 07/03/2018   • Screening for cardiovascular condition 07/03/2018   • Screening for deficiency anemia 07/03/2018   • Thyroid disorder screen 07/03/2018   • Screening for prostate cancer 07/03/2018     He  has a past surgical history that includes Hemorrhoid surgery; Hernia repair; and pr colonoscopy flx dx w/collj spec when pfrmd (N/A, 10/2/2018). His family history includes Hyperlipidemia in his father and mother; Hypertension in his mother; No Known Problems in his brother. He  reports that he has never smoked. He has never used smokeless tobacco. He reports current alcohol use. He reports that he does not use drugs. No current facility-administered medications on file prior to encounter. Current Outpatient Medications on File Prior to Encounter   Medication Sig   • amLODIPine (NORVASC) 5 mg tablet Take 1 tablet (5 mg total) by mouth daily   • simvastatin (ZOCOR) 20 mg tablet take one tablet (20mg total) by mouth daily at bedtime. • tamsulosin (FLOMAX) 0.4 mg Take 1 capsule (0.4 mg total) by mouth daily with dinner   • valsartan (DIOVAN) 80 mg tablet TAKE ONE TABLET BY MOUTH EVERY DAY     He has No Known Allergies.     Vital Signs & PE  /84   Pulse 59   Temp 98 °F (36.7 °C) (Oral)   Resp 15   Ht 5' 9" (1.753 m)   Wt 72.6 kg (160 lb)   SpO2 99%   BMI 23.63 kg/m²   General appearance: alert and oriented, in no acute distress, appears stated age, cooperative and no distress  Head: Normocephalic, without obvious abnormality, atraumatic  Neck: no JVD and supple, symmetrical, trachea midline  Lungs: clear to auscultation bilaterally  Heart: regular rate and rhythm, S1, S2 normal, no murmur, click, rub or gallop  Abdomen: abnormal findings:  moderate tenderness in the RLQ and in the lower abdomen  Male genitalia: normal  Extremities: extremities normal, warm and well-perfused; no cyanosis, clubbing, or edema  Pulses: 2+ and symmetric  Neurologic: Grossly normal  No urinary drains    Laboratory Studies  Lab Results   Component Value Date    K 3.8 09/26/2023    K 4.0 07/05/2022     09/26/2023     07/05/2022    CO2 27 09/26/2023    CO2 27 07/05/2022    CREATININE 1.02 09/26/2023    BUN 24 09/26/2023    BUN 19 07/05/2022     Lab Results   Component Value Date    WBC 8.86 09/26/2023    RBC 5.03 09/26/2023    HGB 15.2 09/26/2023    HCT 47.2 09/26/2023    MCV 94 09/26/2023    MCH 30.2 09/26/2023    RDW 13.2 09/26/2023     09/26/2023           Imaging and Other Studies  )CT renal stone study abdomen pelvis wo contrast    Result Date: 9/22/2023  Narrative: CT ABDOMEN AND PELVIS WITHOUT IV CONTRAST - LOW DOSE RENAL STONE INDICATION:   R10.9: Unspecified abdominal pain R31.29: Other microscopic hematuria. COMPARISON:  None. TECHNIQUE:  Low radiation dose thin section CT examination of the abdomen and pelvis was performed without intravenous or oral contrast according to a protocol specifically designed to evaluate for urinary tract calculus. Axial, sagittal, and coronal 2D  reformatted images were created from the source data and submitted for interpretation. Evaluation for pathology in the abdomen and pelvis that is unrelated to urinary tract calculi is limited. Radiation dose length product (DLP) for this visit:  139 mGy-cm . This examination, like all CT scans performed in the [facility], was performed utilizing techniques to minimize radiation dose exposure, including the use of iterative reconstruction and automated exposure control. URINARY TRACT FINDINGS: RIGHT KIDNEY AND URETER: 7 mm calculus in the proximal ureter, at the level of the transverse process of L3, causing mild hydronephrosis. Multiple intrarenal calculi measuring up to 4 mm.  LEFT KIDNEY AND URETER: Multiple intrarenal calculi measuring up to 6 mm. No hydronephrosis or hydroureter. URINARY BLADDER:  Unremarkable. ADDITIONAL FINDINGS: LOWER CHEST:  No clinically significant abnormality identified in the visualized lower chest. SOLID VISCERA: Limited low radiation dose noncontrast CT evaluation demonstrates no clinically significant abnormality of the imaged portions of the liver, spleen, pancreas, or adrenal glands. GALLBLADDER/BILIARY TREE:  No calcified gallstones. No pericholecystic inflammatory change. No biliary dilatation. STOMACH AND BOWEL:  Unremarkable. APPENDIX:  No findings to suggest appendicitis. ABDOMINOPELVIC CAVITY:  No ascites. No pneumoperitoneum. No lymphadenopathy. VESSELS: Unremarkable for patient's age. REPRODUCTIVE ORGANS:  Unremarkable for patient's age. ABDOMINAL WALL/INGUINAL REGIONS:  There is a small fat-containing umbilical hernia. OSSEOUS STRUCTURES:  No acute fracture or destructive osseous lesion. Spinal degenerative changes are noted. Impression: Mildly obstructing 7 mm calculus in the right proximal ureter. Multiple bilateral intrarenal calculi. Workstation performed: [id number]       Assessment/Plan    · Right ureteral calculus  · Hydronephrosis  · Renal Colic      Plan:   1. Admission for ureteral calculus & renal colic. 2. Initiate aggressive IVFs   3. Flomax. 4. Analgesia/Narcotics   5. Anti-emetics   6. ATBs empirically while awaiting culture  7. Strain urine   8. Medical Consultation for Med. Mgmt (if applicable)   9. NPO  for OR--cystoscopy, right retrograde pyelography, right ureteroscopy, laser lithotripsy and right ureteral stent placement. Surgeon reserves the discretion to place ureteral stent only for instances of technical difficulty and/or evidence of infection for renal unit decompression.   Risks versus benefits and potential complications explained including risk of bleeding, infection, bladder and/or ureteral injury as well as need for additional procedures including outpatient definitive ureteroscopy and/or IR percutaneous nephrostomy tube placement. Problems  Patient Active Problem List   Diagnosis   • Well adult exam   • Screen for colon cancer   • Screening for diabetes mellitus   • Screening for cardiovascular condition   • Screening for deficiency anemia   • Thyroid disorder screen   • Screening for prostate cancer   • Umbilical hernia without obstruction and without gangrene   • Hyperlipidemia   • Grade II hemorrhoids   • Dyspepsia   • History of Lyme disease   • Hypertension   • Arthralgia   • Insomnia   • Nephrolithiasis       DVT Prophylaxis  ambulate ad jigar    Communication  Explained risk, benefits and potential complications of ureteroscopic stone extraction/laser lithotripsy. Consent obtained    Level of Care  Admit to outpatient surgery/no charge.                Donnella Rinne, CRNP

## 2023-09-26 NOTE — Clinical Note
Case was discussed with Urology and the patient's admission status was agreed to be Admission Status: inpatient status to the service of Dr. Yao

## 2023-09-26 NOTE — OP NOTE
OPERATIVE REPORT  PATIENT NAME: Stephan Arenas    :  1965  MRN: [id number]  Pt Location: [facility] OR ROOM 02    SURGERY DATE: 2023    Surgeon(s) and Role:     * Florencio Birmingham MD - Primary    Preop Diagnosis:  7 mm proximal right ureteral stone, 4 mm stone right kidney, right flank pain and hydronephrosis    Postoperative diagnosis: Same    Procedure(s):  Right - CYSTOSCOPY URETEROSCOPY WITH LITHOTRIPSY HOLMIUM LASER. AND INSERTION STENT URETERAL    Specimen(s):  * No specimens in log *    Estimated Blood Loss:   Minimal    Drains:  * No LDAs found *    Anesthesia Type:   General    Operative Indications:  7 mm proximal right ureteral calculus, with pain and hydronephrosis without sign of infection. 4 mm stone and some others in the lower pole the right kidney. Incidentally found stones measuring up to 6 mm in the left kidney. Nonobstructing. 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 Broken up into tiny passable fragments. Complications:   None    Procedure and Technique: 63-year-old man with no previous stone history, who describes recently switching from drinking water to iced tea, lots of it, developed right flank pain 10 out of 10 and presented to the emergency department. He had already been seen in the office and was set up for definitive stone treatment on . However he was unable to tolerate this so he came to the ED. Urinalysis negative afebrile, he wishes to have this taken care of so he was set up for cystoscopy right ureteroscopy laser lithotripsy right ureteral stent placement today. The procedure as well as the risks of bleeding infection damage to the urinary tract and need for additional procedures were explained and he gives informed consent. The patient was brought to the operating room and identified properly.  The patient was prepared and draped in the dorsolithotomy position after general anesthesia was induced, and a time out performed. Care was taken during positioning to protect all extremities. Cystoscopy was performed with 22 Salvadorean cysto sheath and 30  degree lens. The urethra was normal without stricture. The bladder was smooth, nontrabeculated and there were no stones, tumors or other abnormalities. The 0.035 inch wire was fed into the right ureteral orifice and fed up into the renal pelvis. Fluoroscopy revealed a radioopaque stone. A dual lumen catheter was placed over the wire fluoroscopically into the proximal ureter, and a second wire was deployed. Using one wire clamped to the drapes as a safety wire, a 10 Salvadorean 35 cm access sheath was placed, and the flexible ureteroscope was advanced. The proximal ureteral stone was seen and pushed up into the renal pelvis, and holmium laser lithotripsy was carried out with the 272 micron holmium laser fiber, breaking the stone up into small, passable fragments. There are some stones in the lower pole associated with Julian's plaques that were broken up as well but not all of them as they were too attached to the kidney A 6 [place] by 26 cm ureteral stent with string/no string was deployed, and coils were established in the renal pelvis and bladder. The bladder was drained with the cysto sheath, the stent string taped to the penis, and the pt awakened. I was present for the entire procedure. and A qualified resident physician was not available.     Patient Disposition:  PACU  and extubated and stable        SIGNATURE: Lindy Lund MD  DATE: September 26, 2023  TIME: 2:30 PM

## 2023-09-26 NOTE — ED ATTENDING ATTESTATION
9/26/2023  I, Vicki Oakes MD, saw and evaluated the patient. I have discussed the patient with the resident/non-physician practitioner and agree with the resident's/non-physician practitioner's findings, Plan of Care, and MDM as documented in the resident's/non-physician practitioner's note, except where noted. All available labs and Radiology studies were reviewed. I was present for key portions of any procedure(s) performed by the resident/non-physician practitioner and I was immediately available to provide assistance. At this point I agree with the current assessment done in the Emergency Department. I have conducted an independent evaluation of this patient a history and physical is as follows:  Patient is here for recurrent waxing and waning right flank pain secondary to a 7 mm proximal stone. He is supposed to be scheduled to have a procedure on Friday. The pain got bad today and so we came in. In reviewing prior notes it looks like he did not want to have stronger pain medication other than Naprosyn and Flomax. He has had no fevers or chills. No vomiting. The right flank pain is intermittent and is better at this point. Laboratory evaluation is unremarkable at this point and I am texting urology to discuss the case with them to make sure that he is on the schedule for Friday. We can provide the patient with a work note and a pain medication prescription. Currently there is no signs of infection and I do not see any need for an urgent expedited urological procedure at this point and can control his pain with some pain medication at home. 0720: Discussed with urology they will see if they can add him on today. His last meal was 6 PM last night he had a sip of liquid this morning. They will get back to me shortly. ED Course  ED Course as of 09/26/23 0855   Tue Sep 26, 2023   2434 Urine Microscopic  No urinary tract infection.    0754 WBC: 8.86  White blood cell count normal. 0755 Creatinine: 1.02   0755 eGFR: 80  Normal renal function.          Critical Care Time  Procedures

## 2023-09-26 NOTE — TELEPHONE ENCOUNTER
----- Message from Norm Remy MD sent at 9/26/2023  3:15 PM EDT -----  Please call patient to stent removal with nurses in a week and then follow-up with us in 6 months with a kidney bladder ultrasound

## 2023-09-26 NOTE — TELEPHONE ENCOUNTER
Reason for Disposition  • [1] SEVERE pain (e.g., excruciating, scale 8-10) AND [2] present > 1 hour    Answer Assessment - Initial Assessment Questions  1. LOCATION: "Where does it hurt?" (e.g., left, right)      Right lower back    2. ONSET: "When did the pain start?"       6 days ago     3. SEVERITY: "How bad is the pain?" (e.g., Scale 1-10; mild, moderate, or severe)    - MILD (1-3): doesn't interfere with normal activities     - MODERATE (4-7): interferes with normal activities or awakens from sleep     - SEVERE (8-10): excruciating pain and patient unable to do normal activities (stays in bed)        10/10    4. PATTERN: "Does the pain come and go, or is it constant?"       intermittently, every 2-3 hours lasting 20 to 60+ minutes each time    5. CAUSE: "What do you think is causing the pain?"      Kidney stone    6. OTHER SYMPTOMS:  "Do you have any other symptoms?" (e.g., fever, abdominal pain, vomiting, leg weakness, burning with urination, blood in urine)      Nausea     Patient reports that he scheduled on Friday but that he does not think he can wait until Friday because his pain is severe. He is taking Flomax, Tylenol, ibuprofen, and is having no relief. Contacted on-call provider who advised that his pain is uncontrolled at home, patient should go to the ER for pain control and possible admission to hospital.    Contacted patient to make him aware of above information. Spoke to wife who verbalized understanding and was agreeable. Protocols used:  FLANK PAIN-ADULT-

## 2023-09-27 NOTE — TELEPHONE ENCOUNTER
Post Op Note    Jhonatan Jimenez is a 62 y.o. male s/p CYSTOSCOPY URETEROSCOPY WITH LITHOTRIPSY HOLMIUM LASER, AND INSERTION STENT URETERAL (Right: Ureter) performed 9/26/23 by Dr. Morena Holbrook while on call. Jhonatan Jimenez  is seen at the [facility]) office. How would you rate your pain on a scale from 1 to 10, 10 being the worst pain ever? Slight discomfort in the R flank area     Have you had a fever? No    Do you have any difficulty urinating? No    Do you have any other questions or concerns that I can address at this time? Patient reports feeling much better than prior to surgery. Slight flank discomfort, but otherwise no complaints. Discussed stent symptoms, prn medications, and hydration. Also discussed stent with string removal and patient will remove stent on his own next Tuesday at home. Will follow up with patient on 10/3 to ensure stent was removed and to schedule 6 month follow up with renal US prior.

## 2023-10-03 NOTE — TELEPHONE ENCOUNTER
Called and spoke with Amber Richter. Confirmed that stent with string was removed- patient self removed yesterday and is feeling well. No issues at this time. Patient scheduled for 6 month follow up and is aware to have renal US completed 1-2 weeks prior to that visit. Central scheduling phone number provided.

## 2023-11-08 ENCOUNTER — OFFICE VISIT (OUTPATIENT)
Dept: PODIATRY | Facility: CLINIC | Age: 58
End: 2023-11-08
Payer: COMMERCIAL

## 2023-11-08 VITALS — WEIGHT: 160 LBS | RESPIRATION RATE: 18 BRPM | HEIGHT: 69 IN | BODY MASS INDEX: 23.7 KG/M2

## 2023-11-08 DIAGNOSIS — L98.9 SKIN LESION OF FOOT: ICD-10-CM

## 2023-11-08 DIAGNOSIS — M79.671 PAIN OF RIGHT HEEL: ICD-10-CM

## 2023-11-08 DIAGNOSIS — I10 HYPERTENSION, UNSPECIFIED TYPE: ICD-10-CM

## 2023-11-08 DIAGNOSIS — M72.2 PLANTAR FASCIITIS: Primary | ICD-10-CM

## 2023-11-08 PROCEDURE — 20550 NJX 1 TENDON SHEATH/LIGAMENT: CPT | Performed by: PODIATRIST

## 2023-11-08 PROCEDURE — 99242 OFF/OP CONSLTJ NEW/EST SF 20: CPT | Performed by: PODIATRIST

## 2023-11-08 RX ORDER — LIDOCAINE HYDROCHLORIDE 10 MG/ML
1 INJECTION, SOLUTION INFILTRATION; PERINEURAL
Status: SHIPPED | OUTPATIENT
Start: 2023-11-08

## 2023-11-08 RX ORDER — MELOXICAM 15 MG/1
15 TABLET ORAL DAILY
Qty: 30 TABLET | Refills: 0 | Status: SHIPPED | OUTPATIENT
Start: 2023-11-08 | End: 2023-12-08

## 2023-11-08 RX ORDER — BUPIVACAINE HYDROCHLORIDE 2.5 MG/ML
1 INJECTION, SOLUTION INFILTRATION; PERINEURAL
Status: SHIPPED | OUTPATIENT
Start: 2023-11-08

## 2023-11-08 RX ORDER — TRIAMCINOLONE ACETONIDE 40 MG/ML
20 INJECTION, SUSPENSION INTRA-ARTICULAR; INTRAMUSCULAR
Status: SHIPPED | OUTPATIENT
Start: 2023-11-08

## 2023-11-08 RX ADMIN — TRIAMCINOLONE ACETONIDE 20 MG: 40 INJECTION, SUSPENSION INTRA-ARTICULAR; INTRAMUSCULAR at 14:00

## 2023-11-08 RX ADMIN — BUPIVACAINE HYDROCHLORIDE 1 ML: 2.5 INJECTION, SOLUTION INFILTRATION; PERINEURAL at 14:00

## 2023-11-08 RX ADMIN — LIDOCAINE HYDROCHLORIDE 1 ML: 10 INJECTION, SOLUTION INFILTRATION; PERINEURAL at 14:00

## 2023-11-08 NOTE — PROGRESS NOTES
Assessment/Plan:      Explained to patient that his right heel pain is secondary to Planter fasciitis. Discussed treatment options. Recommended stretching exercises. Dispensed heel supports, size 9.5. Injected right heel with 0.5 cc Kenalog 40 along with 1 cc 1% Xylocaine and 1 cc 0.5% Marcaine. Prescribed meloxicam 15 mg daily. Right heel lesion is most consistent with a benign nevus and no treatment needed. Patient told to contact me should it increase in size or symmetry. No problem-specific Assessment & Plan notes found for this encounter. Diagnoses and all orders for this visit:    Plantar fasciitis    Pain of right heel  -     Ambulatory Referral to Podiatry    Skin lesion of foot  -     Ambulatory Referral to Podiatry          Subjective:      Patient ID: Zay Marroquin is a 62 y.o. male. HPI    Patient, a 59-year-old male in apparent good health presents with right heel pain present for 2 to 3 months. Patient relates no trauma to the heel. The pain is intermittent with symptoms of post static dyskinesia. No left heel pain related. Patient is also has a small nevus present on the right heel. It has been present for years and not changed size or shape. It is asymptomatic. The following portions of the patient's history were reviewed and updated as appropriate: allergies, current medications, past family history, past medical history, past social history, past surgical history, and problem list.    Review of Systems   Constitutional: Negative. Gastrointestinal: Negative. Musculoskeletal:  Positive for gait problem. Psychiatric/Behavioral: Negative. Objective:      Resp 18   Ht 5' 9" (1.753 m)   Wt 72.6 kg (160 lb)   BMI 23.63 kg/m²          Physical Exam  Constitutional:       Appearance: Normal appearance. Cardiovascular:      Pulses: Normal pulses. Musculoskeletal:         General: Tenderness present. No signs of injury.       Comments: Pain with palpation medial aspect right heel at fascia insertion into calcaneus. Tinel's sign is negative for tarsal tunnel syndrome. No heel pain with side-to-side pressure on the calcaneus. Skin:     Findings: Lesion present. Comments: Nevus measuring 0.2 cm in diameter present medial aspect right heel. Neurological:      General: No focal deficit present. Mental Status: He is oriented to person, place, and time. Foot/lower extremity injection    Performed by: Ирина Catherine DPM  Authorized by: Ирина Catherine DPM    Procedure:      Other Assisting Provider: No      Verbal consent obtained?: Yes      Risks and benefits: Risks, benefits and alternatives were discussed      Consent given by:  Patient    Patient states understanding of procedure being performed: Yes      Patient identity confirmed:  Verbally with patient    Supporting Documentation:     Indications:  Pain    Procedure Details:                Ethyl Chloride was applied      Needle size: 25 G G    Approach:  Medial    Laterality:  Right    Cyst Aspiration/Injection: No      Location: aponeurosis      Aponeurosis Structures: Plantar fascia origin      Injection Information:       Medications:  1 mL bupivacaine 0.25 %; 1 mL lidocaine 1 %; 20 mg triamcinolone acetonide 40 mg/mL

## 2023-11-09 RX ORDER — VALSARTAN 80 MG/1
80 TABLET ORAL DAILY
Qty: 30 TABLET | Refills: 0 | Status: SHIPPED | OUTPATIENT
Start: 2023-11-09

## 2023-12-07 DIAGNOSIS — I10 HYPERTENSION, UNSPECIFIED TYPE: ICD-10-CM

## 2023-12-07 RX ORDER — VALSARTAN 80 MG/1
80 TABLET ORAL DAILY
Qty: 30 TABLET | Refills: 0 | Status: SHIPPED | OUTPATIENT
Start: 2023-12-07

## 2023-12-21 ENCOUNTER — ANESTHESIA (OUTPATIENT)
Dept: ANESTHESIOLOGY | Facility: HOSPITAL | Age: 58
End: 2023-12-21

## 2023-12-21 ENCOUNTER — ANESTHESIA EVENT (OUTPATIENT)
Dept: ANESTHESIOLOGY | Facility: HOSPITAL | Age: 58
End: 2023-12-21

## 2023-12-28 RX ORDER — SODIUM CHLORIDE 9 MG/ML
125 INJECTION, SOLUTION INTRAVENOUS CONTINUOUS
Status: CANCELLED | OUTPATIENT
Start: 2023-12-28

## 2023-12-29 ENCOUNTER — HOSPITAL ENCOUNTER (OUTPATIENT)
Dept: GASTROENTEROLOGY | Facility: MEDICAL CENTER | Age: 58
Setting detail: OUTPATIENT SURGERY
End: 2023-12-29
Attending: INTERNAL MEDICINE
Payer: COMMERCIAL

## 2023-12-29 ENCOUNTER — ANESTHESIA (OUTPATIENT)
Dept: GASTROENTEROLOGY | Facility: MEDICAL CENTER | Age: 58
End: 2023-12-29

## 2023-12-29 ENCOUNTER — ANESTHESIA EVENT (OUTPATIENT)
Dept: GASTROENTEROLOGY | Facility: MEDICAL CENTER | Age: 58
End: 2023-12-29

## 2023-12-29 VITALS
DIASTOLIC BLOOD PRESSURE: 77 MMHG | OXYGEN SATURATION: 97 % | SYSTOLIC BLOOD PRESSURE: 110 MMHG | HEART RATE: 69 BPM | BODY MASS INDEX: 23.7 KG/M2 | TEMPERATURE: 97.3 F | RESPIRATION RATE: 18 BRPM | HEIGHT: 69 IN | WEIGHT: 160 LBS

## 2023-12-29 DIAGNOSIS — Z86.010 HISTORY OF COLON POLYPS: ICD-10-CM

## 2023-12-29 PROCEDURE — 45385 COLONOSCOPY W/LESION REMOVAL: CPT | Performed by: INTERNAL MEDICINE

## 2023-12-29 PROCEDURE — 88305 TISSUE EXAM BY PATHOLOGIST: CPT | Performed by: PATHOLOGY

## 2023-12-29 RX ORDER — SODIUM CHLORIDE 9 MG/ML
125 INJECTION, SOLUTION INTRAVENOUS CONTINUOUS
Status: DISCONTINUED | OUTPATIENT
Start: 2023-12-29 | End: 2024-01-02 | Stop reason: HOSPADM

## 2023-12-29 RX ORDER — PROPOFOL 10 MG/ML
INJECTION, EMULSION INTRAVENOUS AS NEEDED
Status: DISCONTINUED | OUTPATIENT
Start: 2023-12-29 | End: 2023-12-29

## 2023-12-29 RX ADMIN — PROPOFOL 100 MG: 10 INJECTION, EMULSION INTRAVENOUS at 07:51

## 2023-12-29 RX ADMIN — PROPOFOL 150 MG: 10 INJECTION, EMULSION INTRAVENOUS at 07:47

## 2023-12-29 RX ADMIN — PROPOFOL 100 MG: 10 INJECTION, EMULSION INTRAVENOUS at 07:55

## 2023-12-29 RX ADMIN — SODIUM CHLORIDE 125 ML/HR: 0.9 INJECTION, SOLUTION INTRAVENOUS at 07:30

## 2023-12-29 NOTE — ANESTHESIA POSTPROCEDURE EVALUATION
"Post-Op Assessment Note    CV Status:  Stable  Pain Score: 1    Pain management: adequate       Mental Status:  Alert and awake   Hydration Status:  Euvolemic   PONV Controlled:  Controlled   Airway Patency:  Patent     Post Op Vitals Reviewed: Yes      Staff: Anesthesiologist               BP      Temp      Pulse     Resp      SpO2      /76   Pulse (!) 54   Temp (!) 97.3 °F (36.3 °C) (Temporal)   Resp 16   Ht 5' 9\" (1.753 m)   Wt 72.6 kg (160 lb)   SpO2 97%   BMI 23.63 kg/m²     "

## 2023-12-29 NOTE — H&P
History and Physical - [facility]  Markie Robert 58 y.o. male MRN: [id number]                  HPI: Markie Robert is a 58 y.o. year old male who presents for colonoscopy for history of colon polyps.      REVIEW OF SYSTEMS: Per the HPI, and otherwise unremarkable.    Historical Information   Past Medical History:   Diagnosis Date    Hyperlipidemia     Hypertension      Past Surgical History:   Procedure Laterality Date    HEMORRHOID SURGERY      HERNIA REPAIR      NE COLONOSCOPY FLX DX W/COLLJ SPEC WHEN PFRMD N/A 10/2/2018    Procedure: COLONOSCOPY;  Surgeon: Mo St MD;  Location: [facility] GI LAB;  Service: Gastroenterology    NE CYSTO/URETERO W/LITHOTRIPSY &INDWELL STENT INSRT Right 9/26/2023    Procedure: CYSTOSCOPY URETEROSCOPY WITH LITHOTRIPSY HOLMIUM LASER, AND INSERTION STENT URETERAL;  Surgeon: Shad Kenyon MD;  Location: [facility] OR;  Service: Urology     Social History   Social History     Substance and Sexual Activity   Alcohol Use Yes    Comment: 1 or 2 drinks once or twice a month     Social History     Substance and Sexual Activity   Drug Use No     Social History     Tobacco Use   Smoking Status Never   Smokeless Tobacco Never     Family History   Problem Relation Age of Onset    Hypertension Mother     Hyperlipidemia Mother     Hyperlipidemia Father     No Known Problems Brother        Meds/Allergies       Current Outpatient Medications:     amLODIPine (NORVASC) 5 mg tablet    valsartan (DIOVAN) 80 mg tablet    HYDROcodone-acetaminophen (NORCO) 5-325 mg per tablet    meloxicam (MOBIC) 15 mg tablet    phenazopyridine (PYRIDIUM) 200 mg tablet    simvastatin (ZOCOR) 20 mg tablet    tamsulosin (FLOMAX) 0.4 mg    Current Facility-Administered Medications:     bupivacaine (MARCAINE) 0.25 % injection 1 mL, 1 mL, Infiltration, , 1 mL at 11/08/23 1400    lidocaine (XYLOCAINE) 1 % injection 1 mL, 1 mL, Infiltration, , 1 mL at 11/08/23 1400    sodium chloride 0.9 % infusion, 125 mL/hr,  "Intravenous, Continuous, 125 mL/hr at 12/29/23 0730    triamcinolone acetonide (KENALOG-40) 40 mg/mL injection 20 mg, 20 mg, Infiltration, , 20 mg at 11/08/23 1400    No Known Allergies    Objective     /76   Pulse (!) 54   Temp (!) 97.3 °F (36.3 °C) (Temporal)   Resp 16   Ht 5' 9\" (1.753 m)   Wt 72.6 kg (160 lb)   SpO2 97%   BMI 23.63 kg/m²       PHYSICAL EXAM    Gen: NAD  Head: NCAT  CV: RRR  CHEST: Clear  ABD: soft, NT/ND  EXT: no edema      ASSESSMENT/PLAN:  This is a 58 y.o. year old male here for colonoscopy, and he is stable and optimized for his procedure.       "

## 2023-12-29 NOTE — ANESTHESIA PREPROCEDURE EVALUATION
Procedure:  COLONOSCOPY    Relevant Problems   CARDIO   (+) Hyperlipidemia   (+) Hypertension      /RENAL   (+) Nephrolithiasis        Physical Exam    Airway    Mallampati score: II         Dental       Cardiovascular  Rhythm: regular    Pulmonary   Breath sounds clear to auscultation    Other Findings        Anesthesia Plan  ASA Score- 2     Anesthesia Type- IV sedation with anesthesia with ASA Monitors.         Additional Monitors:     Airway Plan:            Plan Factors-Exercise tolerance (METS): >4 METS.    Chart reviewed.   Existing labs reviewed. Patient summary reviewed.    Patient is not a current smoker. Patient not instructed to abstain from smoking on day of procedure. Patient did not smoke on day of surgery.    There is medical exclusion for perioperative obstructive sleep apnea risk education.        Induction- intravenous.    Postoperative Plan-     Informed Consent- Anesthetic plan and risks discussed with patient.

## 2024-01-03 DIAGNOSIS — I10 HYPERTENSION, UNSPECIFIED TYPE: ICD-10-CM

## 2024-01-03 PROCEDURE — 88305 TISSUE EXAM BY PATHOLOGIST: CPT | Performed by: PATHOLOGY

## 2024-01-03 RX ORDER — VALSARTAN 80 MG/1
80 TABLET ORAL DAILY
Qty: 30 TABLET | Refills: 0 | Status: SHIPPED | OUTPATIENT
Start: 2024-01-03

## 2024-02-10 DIAGNOSIS — I10 HYPERTENSION, UNSPECIFIED TYPE: ICD-10-CM

## 2024-02-10 RX ORDER — VALSARTAN 80 MG/1
80 TABLET ORAL DAILY
Qty: 30 TABLET | Refills: 5 | Status: SHIPPED | OUTPATIENT
Start: 2024-02-10 | End: 2024-02-17

## 2024-02-13 ENCOUNTER — APPOINTMENT (OUTPATIENT)
Dept: NON INVASIVE DIAGNOSTICS | Facility: HOSPITAL | Age: 59
End: 2024-02-13
Payer: COMMERCIAL

## 2024-02-13 ENCOUNTER — APPOINTMENT (EMERGENCY)
Dept: CT IMAGING | Facility: HOSPITAL | Age: 59
End: 2024-02-13
Payer: COMMERCIAL

## 2024-02-13 ENCOUNTER — HOSPITAL ENCOUNTER (OUTPATIENT)
Facility: HOSPITAL | Age: 59
Setting detail: OBSERVATION
End: 2024-02-14
Attending: EMERGENCY MEDICINE | Admitting: INTERNAL MEDICINE
Payer: COMMERCIAL

## 2024-02-13 ENCOUNTER — APPOINTMENT (EMERGENCY)
Dept: RADIOLOGY | Facility: HOSPITAL | Age: 59
End: 2024-02-13
Payer: COMMERCIAL

## 2024-02-13 DIAGNOSIS — R00.1 BRADYCARDIA: ICD-10-CM

## 2024-02-13 DIAGNOSIS — I47.10 SVT (SUPRAVENTRICULAR TACHYCARDIA): Primary | ICD-10-CM

## 2024-02-13 DIAGNOSIS — R00.0 SINUS TACHYCARDIA: ICD-10-CM

## 2024-02-13 LAB
2HR DELTA HS TROPONIN: 1 NG/L
4HR DELTA HS TROPONIN: 1 NG/L
ALBUMIN SERPL BCP-MCNC: 4.5 G/DL (ref 3.5–5)
ALP SERPL-CCNC: 52 U/L (ref 34–104)
ALT SERPL W P-5'-P-CCNC: 24 U/L (ref 7–52)
AMPHETAMINES SERPL QL SCN: NEGATIVE
ANION GAP SERPL CALCULATED.3IONS-SCNC: 10 MMOL/L
ANION GAP SERPL CALCULATED.3IONS-SCNC: 7 MMOL/L
AORTIC ROOT: 3.4 CM
APICAL FOUR CHAMBER EJECTION FRACTION: 57 %
ASCENDING AORTA: 4 CM
AST SERPL W P-5'-P-CCNC: 21 U/L (ref 13–39)
ATRIAL RATE: 117 BPM
ATRIAL RATE: 127 BPM
ATRIAL RATE: 182 BPM
ATRIAL RATE: 60 BPM
ATRIAL RATE: 76 BPM
BARBITURATES UR QL: NEGATIVE
BASOPHILS # BLD AUTO: 0.03 THOUSANDS/ÂΜL (ref 0–0.1)
BASOPHILS NFR BLD AUTO: 0 % (ref 0–1)
BENZODIAZ UR QL: NEGATIVE
BILIRUB SERPL-MCNC: 0.51 MG/DL (ref 0.2–1)
BNP SERPL-MCNC: 59 PG/ML (ref 0–100)
BSA FOR ECHO PROCEDURE: 1.89 M2
BUN SERPL-MCNC: 21 MG/DL (ref 5–25)
BUN SERPL-MCNC: 28 MG/DL (ref 5–25)
CALCIUM SERPL-MCNC: 9.5 MG/DL (ref 8.4–10.2)
CALCIUM SERPL-MCNC: 9.8 MG/DL (ref 8.4–10.2)
CARDIAC TROPONIN I PNL SERPL HS: 10 NG/L
CARDIAC TROPONIN I PNL SERPL HS: 10 NG/L
CARDIAC TROPONIN I PNL SERPL HS: 9 NG/L
CHLORIDE SERPL-SCNC: 106 MMOL/L (ref 96–108)
CHLORIDE SERPL-SCNC: 109 MMOL/L (ref 96–108)
CK SERPL-CCNC: 182 U/L (ref 39–308)
CO2 SERPL-SCNC: 23 MMOL/L (ref 21–32)
CO2 SERPL-SCNC: 25 MMOL/L (ref 21–32)
COCAINE UR QL: NEGATIVE
CREAT SERPL-MCNC: 1.04 MG/DL (ref 0.6–1.3)
CREAT SERPL-MCNC: 1.3 MG/DL (ref 0.6–1.3)
D DIMER PPP FEU-MCNC: 0.88 UG/ML FEU
E WAVE DECELERATION TIME: 118 MS
E/A RATIO: 1.26
EOSINOPHIL # BLD AUTO: 0.09 THOUSAND/ÂΜL (ref 0–0.61)
EOSINOPHIL NFR BLD AUTO: 1 % (ref 0–6)
ERYTHROCYTE [DISTWIDTH] IN BLOOD BY AUTOMATED COUNT: 13.6 % (ref 11.6–15.1)
FLUAV RNA RESP QL NAA+PROBE: NEGATIVE
FLUBV RNA RESP QL NAA+PROBE: NEGATIVE
FRACTIONAL SHORTENING: 27 (ref 28–44)
GFR SERPL CREATININE-BSD FRML MDRD: 60 ML/MIN/1.73SQ M
GFR SERPL CREATININE-BSD FRML MDRD: 78 ML/MIN/1.73SQ M
GLUCOSE P FAST SERPL-MCNC: 83 MG/DL (ref 65–99)
GLUCOSE SERPL-MCNC: 83 MG/DL (ref 65–140)
GLUCOSE SERPL-MCNC: 89 MG/DL (ref 65–140)
HCT VFR BLD AUTO: 48 % (ref 36.5–49.3)
HGB BLD-MCNC: 15.9 G/DL (ref 12–17)
IMM GRANULOCYTES # BLD AUTO: 0.03 THOUSAND/UL (ref 0–0.2)
IMM GRANULOCYTES NFR BLD AUTO: 0 % (ref 0–2)
INTERVENTRICULAR SEPTUM IN DIASTOLE (PARASTERNAL SHORT AXIS VIEW): 1.1 CM
INTERVENTRICULAR SEPTUM: 1.1 CM (ref 0.6–1.1)
IVC: 15 MM
LAAS-AP2: 18.6 CM2
LAAS-AP4: 18.4 CM2
LEFT ATRIUM SIZE: 4.3 CM
LEFT ATRIUM VOLUME (MOD BIPLANE): 55 ML
LEFT ATRIUM VOLUME INDEX (MOD BIPLANE): 28.9 ML/M2
LEFT INTERNAL DIMENSION IN SYSTOLE: 3.7 CM (ref 2.1–4)
LEFT VENTRICLE DIASTOLIC VOLUME (MOD BIPLANE): 64 ML
LEFT VENTRICLE DIASTOLIC VOLUME INDEX (MOD BIPLANE): 33.9 ML/M2
LEFT VENTRICLE SYSTOLIC VOLUME (MOD BIPLANE): 28 ML
LEFT VENTRICLE SYSTOLIC VOLUME INDEX (MOD BIPLANE): 14.8 ML/M2
LEFT VENTRICULAR INTERNAL DIMENSION IN DIASTOLE: 5.1 CM (ref 3.5–6)
LEFT VENTRICULAR POSTERIOR WALL IN END DIASTOLE: 0.9 CM
LEFT VENTRICULAR STROKE VOLUME: 65 ML
LV EF: 56 %
LVSV (TEICH): 65 ML
LYMPHOCYTES # BLD AUTO: 1.57 THOUSANDS/ÂΜL (ref 0.6–4.47)
LYMPHOCYTES NFR BLD AUTO: 22 % (ref 14–44)
MAGNESIUM SERPL-MCNC: 2.3 MG/DL (ref 1.9–2.7)
MCH RBC QN AUTO: 30.8 PG (ref 26.8–34.3)
MCHC RBC AUTO-ENTMCNC: 33.1 G/DL (ref 31.4–37.4)
MCV RBC AUTO: 93 FL (ref 82–98)
METHADONE UR QL: NEGATIVE
MONOCYTES # BLD AUTO: 0.88 THOUSAND/ÂΜL (ref 0.17–1.22)
MONOCYTES NFR BLD AUTO: 12 % (ref 4–12)
MV PEAK A VEL: 0.47 M/S
MV PEAK E VEL: 59 CM/S
MV STENOSIS PRESSURE HALF TIME: 34 MS
MV VALVE AREA P 1/2 METHOD: 6.47
NEUTROPHILS # BLD AUTO: 4.59 THOUSANDS/ÂΜL (ref 1.85–7.62)
NEUTS SEG NFR BLD AUTO: 65 % (ref 43–75)
NRBC BLD AUTO-RTO: 0 /100 WBCS
OPIATES UR QL SCN: NEGATIVE
OXYCODONE+OXYMORPHONE UR QL SCN: NEGATIVE
P AXIS: 40 DEGREES
P AXIS: 5 DEGREES
P AXIS: 59 DEGREES
P AXIS: 65 DEGREES
PCP UR QL: NEGATIVE
PLATELET # BLD AUTO: 248 THOUSANDS/UL (ref 149–390)
PMV BLD AUTO: 10.3 FL (ref 8.9–12.7)
POTASSIUM SERPL-SCNC: 3.9 MMOL/L (ref 3.5–5.3)
POTASSIUM SERPL-SCNC: 4 MMOL/L (ref 3.5–5.3)
PR INTERVAL: 166 MS
PR INTERVAL: 170 MS
PR INTERVAL: 172 MS
PR INTERVAL: 248 MS
PROT SERPL-MCNC: 6.9 G/DL (ref 6.4–8.4)
QRS AXIS: 47 DEGREES
QRS AXIS: 51 DEGREES
QRS AXIS: 59 DEGREES
QRS AXIS: 60 DEGREES
QRS AXIS: 72 DEGREES
QRSD INTERVAL: 84 MS
QRSD INTERVAL: 86 MS
QRSD INTERVAL: 88 MS
QRSD INTERVAL: 90 MS
QRSD INTERVAL: 90 MS
QT INTERVAL: 268 MS
QT INTERVAL: 324 MS
QT INTERVAL: 348 MS
QT INTERVAL: 380 MS
QT INTERVAL: 416 MS
QTC INTERVAL: 380 MS
QTC INTERVAL: 421 MS
QTC INTERVAL: 451 MS
QTC INTERVAL: 460 MS
QTC INTERVAL: 468 MS
RA PRESSURE ESTIMATED: 3 MMHG
RBC # BLD AUTO: 5.17 MILLION/UL (ref 3.88–5.62)
RIGHT ATRIUM AREA SYSTOLE A4C: 14.5 CM2
RIGHT VENTRICLE ID DIMENSION: 3.5 CM
RSV RNA RESP QL NAA+PROBE: NEGATIVE
RV PSP: 13 MMHG
SARS-COV-2 RNA RESP QL NAA+PROBE: NEGATIVE
SL CV LEFT ATRIUM LENGTH A2C: 4.8 CM
SL CV LV EF: 56
SL CV PED ECHO LEFT VENTRICLE DIASTOLIC VOLUME (MOD BIPLANE) 2D: 124 ML
SL CV PED ECHO LEFT VENTRICLE SYSTOLIC VOLUME (MOD BIPLANE) 2D: 59 ML
SODIUM SERPL-SCNC: 139 MMOL/L (ref 135–147)
SODIUM SERPL-SCNC: 141 MMOL/L (ref 135–147)
T WAVE AXIS: -86 DEGREES
T WAVE AXIS: 0 DEGREES
T WAVE AXIS: 23 DEGREES
T WAVE AXIS: 24 DEGREES
T WAVE AXIS: 36 DEGREES
THC UR QL: NEGATIVE
TR MAX PG: 10 MMHG
TR PEAK VELOCITY: 1.6 M/S
TRICUSPID ANNULAR PLANE SYSTOLIC EXCURSION: 2.2 CM
TRICUSPID VALVE PEAK REGURGITATION VELOCITY: 1.56 M/S
TSH SERPL DL<=0.05 MIU/L-ACNC: 4.01 UIU/ML (ref 0.45–4.5)
VENTRICULAR RATE: 117 BPM
VENTRICULAR RATE: 177 BPM
VENTRICULAR RATE: 60 BPM
VENTRICULAR RATE: 76 BPM
VENTRICULAR RATE: 88 BPM
WBC # BLD AUTO: 7.19 THOUSAND/UL (ref 4.31–10.16)

## 2024-02-13 PROCEDURE — 36415 COLL VENOUS BLD VENIPUNCTURE: CPT

## 2024-02-13 PROCEDURE — 99291 CRITICAL CARE FIRST HOUR: CPT | Performed by: EMERGENCY MEDICINE

## 2024-02-13 PROCEDURE — 84443 ASSAY THYROID STIM HORMONE: CPT | Performed by: EMERGENCY MEDICINE

## 2024-02-13 PROCEDURE — 71275 CT ANGIOGRAPHY CHEST: CPT

## 2024-02-13 PROCEDURE — 80307 DRUG TEST PRSMV CHEM ANLYZR: CPT | Performed by: PHYSICIAN ASSISTANT

## 2024-02-13 PROCEDURE — 83735 ASSAY OF MAGNESIUM: CPT | Performed by: EMERGENCY MEDICINE

## 2024-02-13 PROCEDURE — 99204 OFFICE O/P NEW MOD 45 MIN: CPT | Performed by: INTERNAL MEDICINE

## 2024-02-13 PROCEDURE — 80048 BASIC METABOLIC PNL TOTAL CA: CPT | Performed by: INTERNAL MEDICINE

## 2024-02-13 PROCEDURE — 96361 HYDRATE IV INFUSION ADD-ON: CPT

## 2024-02-13 PROCEDURE — 97163 PT EVAL HIGH COMPLEX 45 MIN: CPT

## 2024-02-13 PROCEDURE — G1004 CDSM NDSC: HCPCS

## 2024-02-13 PROCEDURE — 93010 ELECTROCARDIOGRAM REPORT: CPT | Performed by: INTERNAL MEDICINE

## 2024-02-13 PROCEDURE — 0241U HB NFCT DS VIR RESP RNA 4 TRGT: CPT | Performed by: EMERGENCY MEDICINE

## 2024-02-13 PROCEDURE — 84484 ASSAY OF TROPONIN QUANT: CPT | Performed by: EMERGENCY MEDICINE

## 2024-02-13 PROCEDURE — 99223 1ST HOSP IP/OBS HIGH 75: CPT | Performed by: INTERNAL MEDICINE

## 2024-02-13 PROCEDURE — 97165 OT EVAL LOW COMPLEX 30 MIN: CPT

## 2024-02-13 PROCEDURE — 83880 ASSAY OF NATRIURETIC PEPTIDE: CPT | Performed by: EMERGENCY MEDICINE

## 2024-02-13 PROCEDURE — 93306 TTE W/DOPPLER COMPLETE: CPT | Performed by: INTERNAL MEDICINE

## 2024-02-13 PROCEDURE — 93005 ELECTROCARDIOGRAM TRACING: CPT

## 2024-02-13 PROCEDURE — 93306 TTE W/DOPPLER COMPLETE: CPT

## 2024-02-13 PROCEDURE — 80053 COMPREHEN METABOLIC PANEL: CPT | Performed by: EMERGENCY MEDICINE

## 2024-02-13 PROCEDURE — 99291 CRITICAL CARE FIRST HOUR: CPT

## 2024-02-13 PROCEDURE — 82550 ASSAY OF CK (CPK): CPT | Performed by: EMERGENCY MEDICINE

## 2024-02-13 PROCEDURE — 85025 COMPLETE CBC W/AUTO DIFF WBC: CPT | Performed by: EMERGENCY MEDICINE

## 2024-02-13 PROCEDURE — 96360 HYDRATION IV INFUSION INIT: CPT

## 2024-02-13 PROCEDURE — 85379 FIBRIN DEGRADATION QUANT: CPT | Performed by: EMERGENCY MEDICINE

## 2024-02-13 PROCEDURE — 93010 ELECTROCARDIOGRAM REPORT: CPT | Performed by: STUDENT IN AN ORGANIZED HEALTH CARE EDUCATION/TRAINING PROGRAM

## 2024-02-13 RX ORDER — ACETAMINOPHEN 325 MG/1
650 TABLET ORAL EVERY 6 HOURS PRN
Status: DISCONTINUED | OUTPATIENT
Start: 2024-02-13 | End: 2024-02-14 | Stop reason: HOSPADM

## 2024-02-13 RX ORDER — HEPARIN SODIUM 5000 [USP'U]/ML
5000 INJECTION, SOLUTION INTRAVENOUS; SUBCUTANEOUS EVERY 8 HOURS SCHEDULED
Status: DISCONTINUED | OUTPATIENT
Start: 2024-02-13 | End: 2024-02-14 | Stop reason: HOSPADM

## 2024-02-13 RX ORDER — MAGNESIUM HYDROXIDE/ALUMINUM HYDROXICE/SIMETHICONE 120; 1200; 1200 MG/30ML; MG/30ML; MG/30ML
30 SUSPENSION ORAL EVERY 4 HOURS PRN
Status: DISCONTINUED | OUTPATIENT
Start: 2024-02-13 | End: 2024-02-14 | Stop reason: HOSPADM

## 2024-02-13 RX ORDER — PRAVASTATIN SODIUM 40 MG
40 TABLET ORAL
Status: DISCONTINUED | OUTPATIENT
Start: 2024-02-13 | End: 2024-02-14 | Stop reason: HOSPADM

## 2024-02-13 RX ORDER — ONDANSETRON 2 MG/ML
4 INJECTION INTRAMUSCULAR; INTRAVENOUS EVERY 6 HOURS PRN
Status: DISCONTINUED | OUTPATIENT
Start: 2024-02-13 | End: 2024-02-14 | Stop reason: HOSPADM

## 2024-02-13 RX ADMIN — HEPARIN SODIUM 5000 UNITS: 5000 INJECTION INTRAVENOUS; SUBCUTANEOUS at 14:44

## 2024-02-13 RX ADMIN — HEPARIN SODIUM 5000 UNITS: 5000 INJECTION INTRAVENOUS; SUBCUTANEOUS at 23:58

## 2024-02-13 RX ADMIN — HEPARIN SODIUM 5000 UNITS: 5000 INJECTION INTRAVENOUS; SUBCUTANEOUS at 08:00

## 2024-02-13 RX ADMIN — METOPROLOL TARTRATE 25 MG: 25 TABLET, FILM COATED ORAL at 12:25

## 2024-02-13 RX ADMIN — METOPROLOL TARTRATE 25 MG: 25 TABLET, FILM COATED ORAL at 08:00

## 2024-02-13 RX ADMIN — PRAVASTATIN SODIUM 40 MG: 40 TABLET ORAL at 17:15

## 2024-02-13 RX ADMIN — SODIUM CHLORIDE 1000 ML: 0.9 INJECTION, SOLUTION INTRAVENOUS at 01:01

## 2024-02-13 RX ADMIN — IOHEXOL 81 ML: 350 INJECTION, SOLUTION INTRAVENOUS at 01:56

## 2024-02-13 RX ADMIN — METOPROLOL TARTRATE 25 MG: 25 TABLET, FILM COATED ORAL at 23:59

## 2024-02-13 RX ADMIN — METOPROLOL TARTRATE 25 MG: 25 TABLET, FILM COATED ORAL at 19:30

## 2024-02-13 NOTE — PLAN OF CARE
Problem: PAIN - ADULT  Goal: Verbalizes/displays adequate comfort level or baseline comfort level  Description: Interventions:  - Encourage patient to monitor pain and request assistance  - Assess pain using appropriate pain scale  - Administer analgesics based on type and severity of pain and evaluate response  - Implement non-pharmacological measures as appropriate and evaluate response  - Consider cultural and social influences on pain and pain management  - Notify physician/advanced practitioner if interventions unsuccessful or patient reports new pain  Outcome: Progressing     Problem: SAFETY ADULT  Goal: Patient will remain free of falls  Description: INTERVENTIONS:  - Educate patient/family on patient safety including physical limitations  - Instruct patient to call for assistance with activity   - Consult OT/PT to assist with strengthening/mobility   - Keep Call bell within reach  - Keep bed low and locked with side rails adjusted as appropriate  - Keep care items and personal belongings within reach  - Initiate and maintain comfort rounds  - Make Fall Risk Sign visible to staff  - Offer Toileting every  Hours, in advance of need  - Initiate/Maintain alarm  - Obtain necessary fall risk management equipment:   - Apply yellow socks and bracelet for high fall risk patients  - Consider moving patient to room near nurses station  Outcome: Progressing  Goal: Maintain or return to baseline ADL function  Description: INTERVENTIONS:  -  Assess patient's ability to carry out ADLs; assess patient's baseline for ADL function and identify physical deficits which impact ability to perform ADLs (bathing, care of mouth/teeth, toileting, grooming, dressing, etc.)  - Assess/evaluate cause of self-care deficits   - Assess range of motion  - Assess patient's mobility; develop plan if impaired  - Assess patient's need for assistive devices and provide as appropriate  - Encourage maximum independence but intervene and supervise  when necessary  - Involve family in performance of ADLs  - Assess for home care needs following discharge   - Consider OT consult to assist with ADL evaluation and planning for discharge  - Provide patient education as appropriate  Outcome: Progressing  Goal: Maintains/Returns to pre admission functional level  Description: INTERVENTIONS:  - Perform AM-PAC 6 Click Basic Mobility/ Daily Activity assessment daily.  - Set and communicate daily mobility goal to care team and patient/family/caregiver.   - Collaborate with rehabilitation services on mobility goals if consulted  - Perform Range of Motion  times a day.  - Reposition patient every  hours.  - Dangle patient  times a day  - Stand patient  times a day  - Ambulate patient  times a day  - Out of bed to chair  times a day   - Out of bed for meals times a day  - Out of bed for toileting  - Record patient progress and toleration of activity level   Outcome: Progressing     Problem: DISCHARGE PLANNING  Goal: Discharge to home or other facility with appropriate resources  Description: INTERVENTIONS:  - Identify barriers to discharge w/patient and caregiver  - Arrange for needed discharge resources and transportation as appropriate  - Identify discharge learning needs (meds, wound care, etc.)  - Arrange for interpretive services to assist at discharge as needed  - Refer to Case Management Department for coordinating discharge planning if the patient needs post-hospital services based on physician/advanced practitioner order or complex needs related to functional status, cognitive ability, or social support system  Outcome: Progressing     Problem: Knowledge Deficit  Goal: Patient/family/caregiver demonstrates understanding of disease process, treatment plan, medications, and discharge instructions  Description: Complete learning assessment and assess knowledge base.  Interventions:  - Provide teaching at level of understanding  - Provide teaching via preferred learning  methods  Outcome: Progressing

## 2024-02-13 NOTE — CONSULTS
Cardiology Consultation  MD Moise Dunaway MD, [facility]  Emanuel Oakes DO, [facility]  MD Audi Akers DO, [facility]  Isaias Barth DO, [facility]  ----------------------------------------------------------------  [address] [address]    Markie Robert 58 y.o. male MRN: [id number]  Unit/Bed#: [address] 205-01 Encounter: [id number]      Reason for Consultation: Chest pain      ASSESSMENT:   Paroxysmal atrial tachycardia  Blocked PACs  Precordial chest pain  Hypertension  Dyslipidemia    PLAN:  Telemetry shows runs of atrial tachycardia with conversion pauses  ECG showed sinus rhythm with blocked PACs and consecutive APCs  Recommend treating patient's atrial tachycardia with Lopressor 25 mg p.o. twice daily  Can uptitrate as needed for additional heart rate control as BP tolerates  Check 2D echocardiogram to assess cardiac structure and function  TSH was within normal limits  UDS pending and cardiac enzymes negative over 3 sets; doubt ACS  Continue pravastatin  Maintain telemetry  Further recommendations to follow testing    Signed: Emanuel Oakes DO, [facility] FACP      History of Present Illness:  Markie Robert is a 58 y.o. male with hypertension and dyslipidemia presented with chest pain, palpitations and lightheadedness.  Patient began to experience episodes of intermittent palpitations and chest discomfort several days prior to admission.  They would last for period the duration of palpitations continued to worsen and subsequently became severe on the day of admission.  Patient presented to [facility] and was found to be in supraventricular tachycardia with heart rates into the 170s.  Additionally, ECGs demonstrated evidence of blocked PACs.  He was admitted for chest pain and supraventricular tachycardia.  Chest discomfort directly correlated with his tachycardia and resolved when heart rate came down.  Denies lower extremity  swelling, orthopnea or paroxysmal nocturnal dyspnea.      Review of Systems:  Review of Systems   Constitutional: Negative for decreased appetite, fever, weight gain and weight loss.   HENT:  Negative for congestion and sore throat.    Eyes:  Negative for visual disturbance.   Cardiovascular:  Positive for chest pain and palpitations. Negative for dyspnea on exertion, leg swelling and near-syncope.   Respiratory:  Negative for cough and shortness of breath.    Hematologic/Lymphatic: Negative for bleeding problem.   Skin:  Negative for rash.   Musculoskeletal:  Negative for myalgias and neck pain.   Gastrointestinal:  Negative for abdominal pain and nausea.   Neurological:  Negative for light-headedness and weakness.   Psychiatric/Behavioral:  Negative for depression.          Past Medical History:   Diagnosis Date    Hyperlipidemia     Hypertension        Past Surgical History:   Procedure Laterality Date    HEMORRHOID SURGERY      HERNIA REPAIR      CT COLONOSCOPY FLX DX W/COLLJ SPEC WHEN PFRMD N/A 10/2/2018    Procedure: COLONOSCOPY;  Surgeon: Mo St MD;  Location: [facility] GI LAB;  Service: Gastroenterology    CT CYSTO/URETERO W/LITHOTRIPSY &INDWELL STENT INSRT Right 9/26/2023    Procedure: CYSTOSCOPY URETEROSCOPY WITH LITHOTRIPSY HOLMIUM LASER, AND INSERTION STENT URETERAL;  Surgeon: Shad Kenyon MD;  Location: [facility] OR;  Service: Urology       Social History     Socioeconomic History    Marital status: /Civil Union     Spouse name: None    Number of children: None    Years of education: None    Highest education level: None   Occupational History    None   Tobacco Use    Smoking status: Never    Smokeless tobacco: Never   Vaping Use    Vaping status: Never Used   Substance and Sexual Activity    Alcohol use: Yes     Comment: 1 or 2 drinks once or twice a month    Drug use: No    Sexual activity: Yes     Partners: Female     Birth control/protection: OCP   Other Topics Concern    None   Social  History Narrative    None     Social Determinants of Health     Financial Resource Strain: Not on file   Food Insecurity: No Food Insecurity (2/13/2024)    Hunger Vital Sign     Worried About Running Out of Food in the Last Year: Never true     Ran Out of Food in the Last Year: Never true   Transportation Needs: No Transportation Needs (2/13/2024)    PRAPARE - Transportation     Lack of Transportation (Medical): No     Lack of Transportation (Non-Medical): No   Physical Activity: Not on file   Stress: Not on file   Social Connections: Not on file   Intimate Partner Violence: Not on file   Housing Stability: Low Risk  (2/13/2024)    Housing Stability Vital Sign     Unable to Pay for Housing in the Last Year: No     Number of Places Lived in the Last Year: 1     Unstable Housing in the Last Year: No       Family History   Problem Relation Age of Onset    Hypertension Mother     Hyperlipidemia Mother     Hyperlipidemia Father     No Known Problems Brother        No Known Allergies    No current facility-administered medications on file prior to encounter.     Current Outpatient Medications on File Prior to Encounter   Medication Sig    amLODIPine (NORVASC) 5 mg tablet Take 1 tablet (5 mg total) by mouth daily    simvastatin (ZOCOR) 20 mg tablet take one tablet (20mg total) by mouth daily at bedtime.    valsartan (DIOVAN) 80 mg tablet TAKE ONE TABLET BY MOUTH EVERY DAY    HYDROcodone-acetaminophen (NORCO) 5-325 mg per tablet Take 1-2 tablets by mouth every 6 (six) hours as needed for pain for up to 5 doses Max Daily Amount: 8 tablets    meloxicam (MOBIC) 15 mg tablet Take 1 tablet (15 mg total) by mouth daily    phenazopyridine (PYRIDIUM) 200 mg tablet Take 1 tablet (200 mg total) by mouth 3 (three) times a day as needed for bladder spasms    tamsulosin (FLOMAX) 0.4 mg Take 1 capsule (0.4 mg total) by mouth daily with dinner            No current facility-administered medications for this encounter.      Vitals:     "02/13/24 0330 02/13/24 0345 02/13/24 0400 02/13/24 0418   BP: 111/85 107/78 113/78 124/89   BP Location: Right arm   Left arm   Pulse: (!) 120 (!) 111 (!) 112 59   Resp: (!) 27 21 20 18   Temp: 97.9 °F (36.6 °C)   98 °F (36.7 °C)   TempSrc: Oral   Oral   SpO2: 96% 94% 95% 94%   Weight: 72.6 kg (160 lb)   74.1 kg (163 lb 5.8 oz)   Height: 5' 9\" (1.753 m)   5' 9\" (1.753 m)       No intake/output data recorded.      Intake/Output Summary (Last 24 hours) at 2/13/2024 0622  Last data filed at 2/13/2024 0242  Gross per 24 hour   Intake 1000 ml   Output --   Net 1000 ml       Weight change:     PHYSICAL EXAMINATION:  Gen: Awake, Alert, NAD  Head/eyes: AT/NC, pupils equal and round, Anicteric  ENT: mmm  Neck: Supple, No elevated JVP, trachea midline  Resp: CTA bilaterally no w/r/r  CV: RRR +S1, S2, No m/r/g  Abd: Soft, NT/ND + BS  Ext: no LE edema bilaterally  Neuro: Follows commands, moves all extermities  Psych: Appropriate affect, normal mood, pleasant attitude, non-combative  Skin: warm; no rash, erythema or venous stasis changes on exposed skin    Lab Results:  Results from last 7 days   Lab Units 02/13/24  0046   WBC Thousand/uL 7.19   HEMOGLOBIN g/dL 15.9   HEMATOCRIT % 48.0   PLATELETS Thousands/uL 248     Results from last 7 days   Lab Units 02/13/24  0046   POTASSIUM mmol/L 3.9   CHLORIDE mmol/L 106   CO2 mmol/L 23   BUN mg/dL 28*   CREATININE mg/dL 1.30   CALCIUM mg/dL 9.8   ALK PHOS U/L 52   ALT U/L 24   AST U/L 21     No results found for: \"TROPONINT\"      Results from last 7 days   Lab Units 02/13/24  0430 02/13/24  0242 02/13/24  0101 02/13/24  0046   CK TOTAL U/L  --   --  182  --    HS TNI 0HR ng/L  --   --   --  9   HS TNI 2HR ng/L  --  10  --   --    HS TNI 4HR ng/L 10  --   --   --                    No results found for this or any previous visit.    No results found for this or any previous visit.    No results found for this or any previous visit.    No results found for this or any previous " visit.      CXR: No results found for this or any previous visit.    No results found for this or any previous visit.      ECG: SVT/PAT with inferolateral T wave abnormalities  Sinus rhythm with blocked PACs and occasional PVCs    This note was completed in part utilizing M-Modal Fluency Direct Software.  Grammatical errors, random word insertions, spelling mistakes, and incomplete sentences may be an occasional consequence of this system secondary to software limitations, ambient noise, and hardware issues.  If you have any questions or concerns about the content, text, or information contained within the body of this dictation, please contact the provider for clarification.

## 2024-02-13 NOTE — H&P
[facility]  H&P  Name: Markie Robert 58 y.o. male I MRN: [id number]  Unit/Bed#: [address] 205-01 I Date of Admission: 2/13/2024   Date of Service: 2/13/2024 I Hospital Day: 0      Assessment/Plan   * Tachycardia  Assessment & Plan  Initial concern for SVT with subsequent bradycardia and then return to normal cardiac rate.  D/w cardiology and reviewed telemetry w/concern for atrial tachycardia.  -Cleared by EP to remain at [facility] for further formal evaluation w/cardiology  -tsh wnl no recent alcohol use or substance use no s/sx of infection  Cta chest pe study per vrads negative for pe f/u formal read  -d/w cardiology we will start lopressor 25mg bid monitor on tele.    -Check echocardiogram, uds monitor on tele    Hypertension  Assessment & Plan  Hold amlodipine and arb for rate controlling agents    HLD (hyperlipidemia)  Assessment & Plan  Continue statin           VTE Pharmacologic Prophylaxis:   2 encourage ambulation  Code Status: fc  Discussion with family: Updated  (wife) at bedside.    Anticipated Length of Stay: Patient will be admitted on an observation basis with an anticipated length of stay of less than 2 midnights secondary to tachycardia.    Total Time Spent on Date of Encounter in care of patient:  mins. This time was spent on one or more of the following: performing physical exam; counseling and coordination of care; obtaining or reviewing history; documenting in the medical record; reviewing/ordering tests, medications or procedures; communicating with other healthcare professionals and discussing with patient's family/caregivers.    Chief Complaint: dizziness/lightheadedness cp    History of Present Illness:  Markie Robert is a 58 y.o. male with a PMH of hypertension hyperlipidemia rare alcohol use who presents with palpitations dizziness lightheadedness and chest pain.  Patient reports that he was in his normal state of health he recently stopped  "taking his statin medication due to some arthralgias but otherwise been feeling fine where he was intermittently having episodes of lightheadedness as well as palpitations sensation and tightness in his left lateral chest.  He reported that these episodes were self-limiting and less intense but on the day of admission had intensified during his shift at work in the early afternoon.  Patient noted that these episodes were lasting longer as well as increased intensity and happening more frequently.  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  He notes he drinks \"1 beer denies any drug use reports that he has never had this happen prior to the last 2 weeks.  No sore throat fevers chills no cough abdominal pain nausea vomiting diarrhea.  In ed there was concern for tachycardia and possible svt with conversion with bradycardia back to a normal rate.  The degree of bradycardia in the 30s lead to d/w EP who recommended formal evaluation w/cardiology given sinus bradycardia w/o overt block as likely a conversion pause.  After d/w cardiology recommendation for treatment for possible atrial tachycardia commenced.  We will admit for tachycardia and transient bradycardia  .    Review of Systems:  Review of Systems   Constitutional:  Negative for appetite change, chills and fever.   Respiratory:  Positive for chest tightness and shortness of breath.    Cardiovascular:  Positive for palpitations.   Neurological:  Positive for light-headedness.   All other systems reviewed and are negative.      Past Medical and Surgical History:   Past Medical History:   Diagnosis Date    Hyperlipidemia     Hypertension        Past Surgical History:   Procedure Laterality Date    HEMORRHOID SURGERY      HERNIA REPAIR      MT COLONOSCOPY FLX DX W/COLLJ SPEC WHEN PFRMD N/A 10/2/2018    Procedure: COLONOSCOPY;  Surgeon: Mo St MD;  Location: [facility] GI LAB;  Service: Gastroenterology "    DE CYSTO/URETERO W/LITHOTRIPSY &INDWELL STENT INSRT Right 9/26/2023    Procedure: CYSTOSCOPY URETEROSCOPY WITH LITHOTRIPSY HOLMIUM LASER, AND INSERTION STENT URETERAL;  Surgeon: Shad Kenyon MD;  Location: [facility];  Service: Urology       Meds/Allergies:  Prior to Admission medications    Medication Sig Start Date End Date Taking? Authorizing Provider   amLODIPine (NORVASC) 5 mg tablet Take 1 tablet (5 mg total) by mouth daily 7/14/23  Yes Xenia Aly DO   simvastatin (ZOCOR) 20 mg tablet take one tablet (20mg total) by mouth daily at bedtime. 3/22/23  Yes Xenia Aly DO   valsartan (DIOVAN) 80 mg tablet TAKE ONE TABLET BY MOUTH EVERY DAY 2/10/24  Yes Zoraida Nunn DO   HYDROcodone-acetaminophen (NORCO) 5-325 mg per tablet Take 1-2 tablets by mouth every 6 (six) hours as needed for pain for up to 5 doses Max Daily Amount: 8 tablets 9/26/23   Shad Kenyon MD   meloxicam (MOBIC) 15 mg tablet Take 1 tablet (15 mg total) by mouth daily 11/8/23 12/8/23  Landen Persaud DPM   phenazopyridine (PYRIDIUM) 200 mg tablet Take 1 tablet (200 mg total) by mouth 3 (three) times a day as needed for bladder spasms 9/26/23   Shad Kenyon MD   tamsulosin (FLOMAX) 0.4 mg Take 1 capsule (0.4 mg total) by mouth daily with dinner 9/22/23   Zoraida Nunn DO     I have reviewed home medications with patient personally.    Allergies: No Known Allergies    Social History:  Marital Status: /Civil Union   Occupation:   Patient Pre-hospital Living Situation:   Patient Pre-hospital Level of Mobility:   Patient Pre-hospital Diet Restrictions:   Substance Use History:   Social History     Substance and Sexual Activity   Alcohol Use Yes    Comment: 1 or 2 drinks once or twice a month     Social History     Tobacco Use   Smoking Status Never   Smokeless Tobacco Never     Social History     Substance and Sexual Activity   Drug Use No       Family History:  Family History   Problem Relation Age of Onset    Hypertension Mother      "Hyperlipidemia Mother     Hyperlipidemia Father     No Known Problems Brother        Physical Exam:     Vitals:   Blood Pressure: 124/89 (02/13/24 0418)  Pulse: 59 (02/13/24 0418)  Temperature: 98 °F (36.7 °C) (02/13/24 0418)  Temp Source: Oral (02/13/24 0418)  Respirations: 18 (02/13/24 0418)  Height: 5' 9\" (175.3 cm) (02/13/24 0418)  Weight - Scale: 74.1 kg (163 lb 5.8 oz) (02/13/24 0418)  SpO2: 94 % (02/13/24 0418)    Physical Exam  Vitals reviewed.   Constitutional:       General: He is not in acute distress.     Appearance: Normal appearance. He is not ill-appearing, toxic-appearing or diaphoretic.   HENT:      Head: Normocephalic and atraumatic.      Right Ear: External ear normal.      Left Ear: External ear normal.   Eyes:      Extraocular Movements: Extraocular movements intact.   Cardiovascular:      Rate and Rhythm: Tachycardia present. Rhythm irregular.   Pulmonary:      Effort: No respiratory distress.      Breath sounds: No wheezing, rhonchi or rales.   Abdominal:      General: There is no distension.      Palpations: Abdomen is soft. There is no mass.      Tenderness: There is no abdominal tenderness. There is no guarding or rebound.      Hernia: No hernia is present.   Musculoskeletal:      Right lower leg: No edema.      Left lower leg: No edema.   Skin:     General: Skin is warm and dry.      Coloration: Skin is not jaundiced.      Findings: No bruising or lesion.   Neurological:      Mental Status: He is alert. Mental status is at baseline.   Psychiatric:         Mood and Affect: Mood normal.          Additional Data:     Lab Results:  Results from last 7 days   Lab Units 02/13/24  0046   WBC Thousand/uL 7.19   HEMOGLOBIN g/dL 15.9   HEMATOCRIT % 48.0   PLATELETS Thousands/uL 248   NEUTROS PCT % 65   LYMPHS PCT % 22   MONOS PCT % 12   EOS PCT % 1     Results from last 7 days   Lab Units 02/13/24  0046   SODIUM mmol/L 139   POTASSIUM mmol/L 3.9   CHLORIDE mmol/L 106   CO2 mmol/L 23   BUN mg/dL 28* "   CREATININE mg/dL 1.30   ANION GAP mmol/L 10   CALCIUM mg/dL 9.8   ALBUMIN g/dL 4.5   TOTAL BILIRUBIN mg/dL 0.51   ALK PHOS U/L 52   ALT U/L 24   AST U/L 21   GLUCOSE RANDOM mg/dL 89                       Lines/Drains:  Invasive Devices       Peripheral Intravenous Line  Duration             Peripheral IV 02/13/24 Distal;Dorsal (posterior);Left Forearm <1 day    Peripheral IV 02/13/24 Left Antecubital <1 day              Drain  Duration             Ureteral Internal Stent Right ureter 6 Fr. 139 days                        Imaging: Reviewed radiology reports from this admission including: chest CT scan  CTA ED chest PE study   ED Interpretation by Meliton Samson Jr., DO (02/13 0258)   Per [facility]:    No acute pulmonary emboli.  Moderate atherosclerotic calcification of the coronary arteries.  Otherwise, no evidence of acute intrathoracic abnormality.          EKG and Other Studies Reviewed on Admission:   EKG:     ** Please Note: This note has been constructed using a voice recognition system. **

## 2024-02-13 NOTE — OCCUPATIONAL THERAPY NOTE
Occupational Therapy Evaluation     Patient Name: Markie Robert  Today's Date: 2/13/2024  Problem List  Principal Problem:    Tachycardia  Active Problems:    HLD (hyperlipidemia)    Hypertension    Past Medical History  Past Medical History:   Diagnosis Date    Hyperlipidemia     Hypertension      Past Surgical History  Past Surgical History:   Procedure Laterality Date    HEMORRHOID SURGERY      HERNIA REPAIR      AR COLONOSCOPY FLX DX W/COLLJ SPEC WHEN PFRMD N/A 10/2/2018    Procedure: COLONOSCOPY;  Surgeon: Mo St MD;  Location: [facility] GI LAB;  Service: Gastroenterology    AR CYSTO/URETERO W/LITHOTRIPSY &INDWELL STENT INSRT Right 9/26/2023    Procedure: CYSTOSCOPY URETEROSCOPY WITH LITHOTRIPSY HOLMIUM LASER, AND INSERTION STENT URETERAL;  Surgeon: Shad Kenyon MD;  Location: [facility] OR;  Service: Urology           02/13/24 1004   OT Last Visit   OT Visit Date 02/13/24   Note Type   Note type Evaluation   Pain Assessment   Pain Assessment Tool 0-10   Pain Score No Pain   Restrictions/Precautions   Weight Bearing Precautions Per Order No   Other Precautions Telemetry   Home Living   Type of Home House   Home Layout One level;Stairs to enter with rails   Bathroom Shower/Tub Tub/shower unit   Bathroom Toilet Standard   Bathroom Equipment   (Denies DME)   Bathroom Accessibility Accessible   Home Equipment   (Denies DME)   Additional Comments Pt lives with spouse in a one level house with 2 MULU and FOS to finished basement.   Prior Function   Level of [place] Independent with functional mobility;Independent with ADLs;Independent with IADLS   Lives With Spouse   IADLs Independent with driving   Falls in the last 6 months 0   Vocational Full time employment   Comments At baseline, pt was I w/ ADLs, IADLs, and functional transfers/mobility w/o use of AD. (+) . Denies falls PTA.   Lifestyle   Autonomy At baseline, pt was I w/ ADLs, IADLs, and functional transfers/mobility w/o use of AD. (+) .  Denies falls PTA.   Reciprocal Relationships Spouse   Service to Others FT employed   ADL   Where Assessed Edge of bed   Eating Assistance 7  Independent   Grooming Assistance 7  Independent   UB Bathing Assistance 7  Independent   LB Bathing Assistance 7  Independent   UB Dressing Assistance 7  Independent   LB Dressing Assistance 7  Independent   Toileting Assistance  7  Independent   Functional Assistance 7  Independent   Bed Mobility   Supine to Sit 6  Modified independent   Additional items HOB elevated   Sit to Supine Unable to assess   Additional Comments Pt seated EOB at end of session. Call bell and phone within reach. All needs met and pt reports no further questions for OT at this time.   Transfers   Sit to Stand 7  Independent   Stand to Sit 7  Independent   Functional Mobility   Functional Mobility 7  Independent   Additional Comments w/o use of AD   Balance   Static Sitting Normal   Dynamic Sitting Normal   Static Standing Normal   Dynamic Standing Normal   Ambulatory Normal   Activity Tolerance   Activity Tolerance Patient tolerated treatment well   Medical Staff Made Aware Ramo, EDUARDO   Nurse Made Aware yes; DOMINIC Roque   RUE Assessment   RUE Assessment WNL  (4+/5 throughout)   LUE Assessment   LUE Assessment WNL  (4+/5 throughout)   Hand Function   Gross Motor Coordination Functional   Fine Motor Coordination Functional   Sensation   Light Touch No apparent deficits   Proprioception   Proprioception No apparent deficits   Vision - Complex Assessment   Acuity Able to read clock/calendar on wall without difficulty;Able to read employee name badge without difficulty   Psychosocial   Psychosocial (WDL) WDL   Perception   Inattention/Neglect Appears intact   Cognition   Overall Cognitive Status WFL   Arousal/Participation Alert;Cooperative   Attention Within functional limits   Orientation Level Oriented X4   Memory Within functional limits   Following Commands Follows all commands and directions without  difficulty   Assessment   Prognosis Good   Assessment Pt is a 58 y.o. male seen for OT evaluation s/p adm to [facility] on [facility]/13/2024 w/ Tachycardia . Comorbidities affecting pt’s functional performance include a significant PMH of HLD, HTN. Pt with active OT orders and activity orders for Up and OOB as tolerated. Pt lives with spouse in a one level house with 2 MULU and FOS to finished basement. At baseline, pt was I w/ ADLs, IADLs, and functional transfers/mobility w/o use of AD. (+) . Denies falls PTA. Upon evaluation, pt currently functioning at an Independent level for ADLs, bed mobility, and functional mobility/transfers. Pt reports no chest pain, SOB, or lightheadedness w/ evaluation. Pt is functioning near baseline level of performance. Limited ADL deficits. Based on the aforementioned OT evaluation, functional performance deficits, and assessments, pt has been identified as a Low complexity evaluation. No further acute OT needs identified at this time. Recommend continued mobilization with hospital staff and restorative program while in the hospital to increase pt’s endurance and strength upon D/C. The patient's raw score on the AM-PAC Daily Activity Inpatient Short Form is 24. A raw score of greater than or equal to 19 suggests the patient may benefit from discharge to home. Please refer to the recommendation of the Occupational Therapist for safe discharge planning. D/C pt from OT caseload at this time.   Goals   Patient Goals To go home   Plan   OT Frequency Eval only  (D/C OT)   Discharge Recommendation   Rehab Resource Intensity Level, OT No post-acute rehabilitation needs   AM-PAC Daily Activity Inpatient   Lower Body Dressing 4   Bathing 4   Toileting 4   Upper Body Dressing 4   Grooming 4   Eating 4   Daily Activity Raw Score 24   Daily Activity Standardized Score (Calc for Raw Score >=11) 57.54   AM-PAC Applied Cognition Inpatient   Following a Speech/Presentation 4   Understanding  Ordinary Conversation 4   Taking Medications 4   Remembering Where Things Are Placed or Put Away 4   Remembering List of 4-5 Errands 4   Taking Care of Complicated Tasks 4   Applied Cognition Raw Score 24   Applied Cognition Standardized Score 62.21        Dania Freeman, OTR/L

## 2024-02-13 NOTE — ED PROVIDER NOTES
History  Chief Complaint   Patient presents with    Chest Pain     Pt reports chest pressure that started 2 weeks ago with intermittent episodes that include palpitations.  Tonight, pressure was more severe and pt though he might pass out       Presents with 2 weeks of intermittent chest pressure, shortness of breath and lightheadedness that started tonight.  He states that he has been feeling palpitations with the symptoms as well but all of the symptoms have worsened tonight.  He never passed out and denies any prior history of this.  Does take blood pressure medications and states he has not missed any.  Was recently on an antilipid medication but stopped due to muscle cramps.  Denies any over-the-counter medications, drugs, alcohol or marijuana.      History provided by:  Patient and spouse   used: No    Chest Pain  Associated symptoms: dizziness, palpitations and shortness of breath    Associated symptoms: no abdominal pain, no back pain, no cough, no fever, no nausea and not vomiting        Prior to Admission Medications   Prescriptions Last Dose Informant Patient Reported? Taking?   HYDROcodone-acetaminophen (NORCO) 5-325 mg per tablet  Self No No   Sig: Take 1-2 tablets by mouth every 6 (six) hours as needed for pain for up to 5 doses Max Daily Amount: 8 tablets   amLODIPine (NORVASC) 5 mg tablet 2/13/2024 Self No Yes   Sig: Take 1 tablet (5 mg total) by mouth daily   meloxicam (MOBIC) 15 mg tablet   No No   Sig: Take 1 tablet (15 mg total) by mouth daily   phenazopyridine (PYRIDIUM) 200 mg tablet  Self No No   Sig: Take 1 tablet (200 mg total) by mouth 3 (three) times a day as needed for bladder spasms   simvastatin (ZOCOR) 20 mg tablet 2/12/2024 Self No Yes   Sig: take one tablet (20mg total) by mouth daily at bedtime.   tamsulosin (FLOMAX) 0.4 mg  Self No No   Sig: Take 1 capsule (0.4 mg total) by mouth daily with dinner   valsartan (DIOVAN) 80 mg tablet 2/13/2024  No Yes   Sig: TAKE  ONE TABLET BY MOUTH EVERY DAY      Facility-Administered Medications Last Administration Doses Remaining   bupivacaine (MARCAINE) 0.25 % injection 1 mL 11/8/2023  2:00 PM    lidocaine (XYLOCAINE) 1 % injection 1 mL 11/8/2023  2:00 PM    triamcinolone acetonide (KENALOG-40) 40 mg/mL injection 20 mg 11/8/2023  2:00 PM           Past Medical History:   Diagnosis Date    Hyperlipidemia     Hypertension        Past Surgical History:   Procedure Laterality Date    HEMORRHOID SURGERY      HERNIA REPAIR      IL COLONOSCOPY FLX DX W/COLLJ SPEC WHEN PFRMD N/A 10/2/2018    Procedure: COLONOSCOPY;  Surgeon: Mo St MD;  Location: [facility];  Service: Gastroenterology    IL CYSTO/URETERO W/LITHOTRIPSY &INDWELL STENT INSRT Right 9/26/2023    Procedure: CYSTOSCOPY URETEROSCOPY WITH LITHOTRIPSY HOLMIUM LASER, AND INSERTION STENT URETERAL;  Surgeon: Shad Kenyon MD;  Location: [facility];  Service: Urology       Family History   Problem Relation Age of Onset    Hypertension Mother     Hyperlipidemia Mother     Hyperlipidemia Father     No Known Problems Brother      I have reviewed and agree with the history as documented.    E-Cigarette/Vaping    E-Cigarette Use Never User      E-Cigarette/Vaping Substances     Social History     Tobacco Use    Smoking status: Never    Smokeless tobacco: Never   Vaping Use    Vaping status: Never Used   Substance Use Topics    Alcohol use: Yes     Comment: 1 or 2 drinks once or twice a month    Drug use: No       Review of Systems   Constitutional:  Negative for chills and fever.   Eyes:  Negative for visual disturbance.   Respiratory:  Positive for chest tightness and shortness of breath. Negative for cough.    Cardiovascular:  Positive for chest pain and palpitations. Negative for leg swelling.   Gastrointestinal:  Negative for abdominal pain, nausea and vomiting.   Musculoskeletal:  Negative for back pain and neck pain.   Skin:  Negative for color change, pallor, rash and wound.    Allergic/Immunologic: Negative for immunocompromised state.   Neurological:  Positive for dizziness and light-headedness. Negative for syncope.   All other systems reviewed and are negative.      Physical Exam  Physical Exam  Vitals and nursing note reviewed.   Constitutional:       General: He is not in acute distress.     Appearance: He is well-developed. He is not ill-appearing, toxic-appearing or diaphoretic.   HENT:      Head: Normocephalic and atraumatic.      Right Ear: External ear normal.      Left Ear: External ear normal.      Nose: No congestion.   Eyes:      General: No scleral icterus.     Conjunctiva/sclera: Conjunctivae normal.      Right eye: Right conjunctiva is not injected.      Left eye: Left conjunctiva is not injected.      Pupils: Pupils are equal, round, and reactive to light.   Neck:      Trachea: No tracheal deviation.   Cardiovascular:      Rate and Rhythm: Tachycardia present. Rhythm irregular.      Pulses: Normal pulses.      Heart sounds: Normal heart sounds. No murmur heard.     No friction rub.   Pulmonary:      Effort: Pulmonary effort is normal. No respiratory distress.      Breath sounds: Normal breath sounds. No stridor. No wheezing or rales.   Abdominal:      General: There is no distension.      Palpations: Abdomen is soft.      Tenderness: There is no abdominal tenderness. There is no guarding or rebound.   Musculoskeletal:         General: No tenderness or deformity. Normal range of motion.      Cervical back: Normal range of motion and neck supple.   Skin:     General: Skin is warm and dry.      Capillary Refill: Capillary refill takes less than 2 seconds.      Coloration: Skin is not pale.      Findings: No erythema or rash.   Neurological:      Mental Status: He is alert and oriented to person, place, and time.      Motor: No abnormal muscle tone.   Psychiatric:         Mood and Affect: Mood normal.         Behavior: Behavior normal.         Vital Signs  ED Triage Vitals    Temperature Pulse Respirations Blood Pressure SpO2   02/13/24 0037 02/13/24 0034 02/13/24 0034 02/13/24 0034 02/13/24 0034   97.9 °F (36.6 °C) (!) 127 16 (!) 141/102 98 %      Temp Source Heart Rate Source Patient Position - Orthostatic VS BP Location FiO2 (%)   02/13/24 0037 02/13/24 0034 02/13/24 0034 02/13/24 0034 --   Oral Monitor Lying Right arm       Pain Score       02/13/24 0034       4           Vitals:    02/13/24 0215 02/13/24 0230 02/13/24 0245 02/13/24 0300   BP: (!) 85/65 105/82 108/79 112/87   Pulse: (S) (!) 40 (!) 118 (!) 122 (!) 111   Patient Position - Orthostatic VS: Lying Lying Lying Lying         Visual Acuity      ED Medications  Medications   sodium chloride 0.9 % bolus 1,000 mL (0 mL Intravenous Stopped 2/13/24 0242)   iohexol (OMNIPAQUE) 350 MG/ML injection (MULTI-DOSE) 81 mL (81 mL Intravenous Given 2/13/24 0156)       Diagnostic Studies  Results Reviewed       Procedure Component Value Units Date/Time    HS Troponin I 2hr [[id number]]  (Normal) Collected: 02/13/24 0242    Lab Status: Final result Specimen: Blood from Arm, Left Updated: 02/13/24 0313     hs TnI 2hr 10 ng/L      Delta 2hr hsTnI 1 ng/L     HS Troponin I 4hr [[id number]]     Lab Status: No result Specimen: Blood     FLU/RSV/COVID - if FLU/RSV clinically relevant [[id number]]  (Normal) Collected: 02/13/24 0101    Lab Status: Final result Specimen: Nares from Nose Updated: 02/13/24 0146     SARS-CoV-2 Negative     INFLUENZA A PCR Negative     INFLUENZA B PCR Negative     RSV PCR Negative    Narrative:      FOR PEDIATRIC PATIENTS - copy/paste COVID Guidelines URL to browser: [email or web address]    SARS-CoV-2 assay is a Nucleic Acid Amplification assay intended for the  qualitative detection of nucleic acid from SARS-CoV-2 in nasopharyngeal  swabs. Results are for the presumptive identification of SARS-CoV-2 RNA.    Positive results are indicative of infection with SARS-CoV-2,  the virus  causing COVID-19, but do not rule out bacterial infection or co-infection  with other viruses. Laboratories within the [place] and its  territories are required to report all positive results to the appropriate  public health authorities. Negative results do not preclude SARS-CoV-2  infection and should not be used as the sole basis for treatment or other  patient management decisions. Negative results must be combined with  clinical observations, patient history, and epidemiological information.  This test has not been FDA cleared or approved.    This test has been authorized by FDA under an Emergency Use Authorization  (EUA). This test is only authorized for the duration of time the  declaration that circumstances exist justifying the authorization of the  emergency use of an in vitro diagnostic tests for detection of SARS-CoV-2  virus and/or diagnosis of COVID-19 infection under section 564(b)(1) of  the Act, 21 U.S.C. 360bbb-3(b)(1), unless the authorization is terminated  or revoked sooner. The test has been validated but independent review by FDA  and CLIA is pending.    Test performed using [employer] Gene[employer]pert: This RT-PCR assay targets N2,  a region unique to SARS-CoV-2. A conserved region in the E-gene was chosen  for pan-Sarbecovirus detection which includes SARS-CoV-2.    According to CMS-2020-01-R, this platform meets the definition of high-throughput technology.    TSH, 3rd generation with Free T4 reflex [[id number]]  (Normal) Collected: 02/13/24 0101    Lab Status: Final result Specimen: Blood from Arm, Left Updated: 02/13/24 0138     TSH 3RD GENERATON 4.014 uIU/mL     B-Type Natriuretic Peptide(BNP) [[id number]]  (Normal) Collected: 02/13/24 0101    Lab Status: Final result Specimen: Blood from Arm, Left Updated: 02/13/24 0127     BNP 59 pg/mL     Magnesium [[id number]]  (Normal) Collected: 02/13/24 0101    Lab Status: Final result Specimen: Blood from Arm, Left Updated: 02/13/24 0124      Magnesium 2.3 mg/dL     CK [[id number]]  (Normal) Collected: 02/13/24 0101    Lab Status: Final result Specimen: Blood from Arm, Left Updated: 02/13/24 0124     Total  U/L     D-Dimer [[id number]]  (Abnormal) Collected: 02/13/24 0101    Lab Status: Final result Specimen: Blood from Arm, Left Updated: 02/13/24 0123     D-Dimer, Quant 0.88 ug/ml FEU     Narrative:      In the evaluation for possible pulmonary embolism, in the appropriate (Well's Score of 4 or less) patient, the age adjusted d-dimer cutoff for this patient can be calculated as:    Age x 0.01 (in ug/mL) for Age-adjusted D-dimer exclusion threshold for a patient over 50 years.    HS Troponin 0hr (reflex protocol) [[id number]]  (Normal) Collected: 02/13/24 0046    Lab Status: Final result Specimen: Blood from Arm, Left Updated: 02/13/24 0112     hs TnI 0hr 9 ng/L     Comprehensive metabolic panel [[id number]]  (Abnormal) Collected: 02/13/24 0046    Lab Status: Final result Specimen: Blood from Arm, Left Updated: 02/13/24 0109     Sodium 139 mmol/L      Potassium 3.9 mmol/L      Chloride 106 mmol/L      CO2 23 mmol/L      ANION GAP 10 mmol/L      BUN 28 mg/dL      Creatinine 1.30 mg/dL      Glucose 89 mg/dL      Calcium 9.8 mg/dL      AST 21 U/L      ALT 24 U/L      Alkaline Phosphatase 52 U/L      Total Protein 6.9 g/dL      Albumin 4.5 g/dL      Total Bilirubin 0.51 mg/dL      eGFR 60 ml/min/1.73sq m     Narrative:      National Kidney Disease Foundation guidelines for Chronic Kidney Disease (CKD):     Stage 1 with normal or high GFR (GFR > 90 mL/min/1.73 square meters)    Stage 2 Mild CKD (GFR = 60-89 mL/min/1.73 square meters)    Stage 3A Moderate CKD (GFR = 45-59 mL/min/1.73 square meters)    Stage 3B Moderate CKD (GFR = 30-44 mL/min/1.73 square meters)    Stage 4 Severe CKD (GFR = 15-29 mL/min/1.73 square meters)    Stage 5 End Stage CKD (GFR <15 mL/min/1.73 square meters)  Note: GFR calculation is accurate only with a steady state creatinine     CBC and differential [[id number]] Collected: 02/13/24 0046    Lab Status: Final result Specimen: Blood from Arm, Left Updated: 02/13/24 0054     WBC 7.19 Thousand/uL      RBC 5.17 Million/uL      Hemoglobin 15.9 g/dL      Hematocrit 48.0 %      MCV 93 fL      MCH 30.8 pg      MCHC 33.1 g/dL      RDW 13.6 %      MPV 10.3 fL      Platelets 248 Thousands/uL      nRBC 0 /100 WBCs      Neutrophils Relative 65 %      Immat GRANS % 0 %      Lymphocytes Relative 22 %      Monocytes Relative 12 %      Eosinophils Relative 1 %      Basophils Relative 0 %      Neutrophils Absolute 4.59 Thousands/µL      Immature Grans Absolute 0.03 Thousand/uL      Lymphocytes Absolute 1.57 Thousands/µL      Monocytes Absolute 0.88 Thousand/µL      Eosinophils Absolute 0.09 Thousand/µL      Basophils Absolute 0.03 Thousands/µL                    CTA ED chest PE study   ED Interpretation by Meliton Samson Jr., DO (02/13 0258)   Per [facility]:    No acute pulmonary emboli.  Moderate atherosclerotic calcification of the coronary arteries.  Otherwise, no evidence of acute intrathoracic abnormality.                 Procedures  ECG 12 Lead Documentation Only    Date/Time: 2/13/2024 12:38 AM    Performed by: Meliton Samson Jr., DO  Authorized by: Meliton Samson Jr., DO    Indications / Diagnosis:  Chest pain  ECG reviewed by me, the ED Provider: yes    Patient location:  ED  Previous ECG:     Previous ECG:  Unavailable    Comparison to cardiac monitor: Yes    Interpretation:     Interpretation: abnormal    Rate:     ECG rate:  177    ECG rate assessment: tachycardic    Rhythm:     Rhythm: SVT    Ectopy:     Ectopy: none    QRS:     QRS intervals:  Normal  Conduction:     Conduction: normal    ST segments:     ST segments:  Non-specific  T waves:     T waves: non-specific    ECG 12 Lead Documentation Only    Date/Time: 2/13/2024 2:28 AM    Performed by: Meliton Samson Jr., DO  Authorized by: Meliton Samson Jr., DO    Indications / Diagnosis:   Bradycardia  ECG reviewed by me, the ED Provider: yes    Patient location:  ED  Previous ECG:     Previous ECG:  Compared to current    Similarity:  Changes noted    Comparison to cardiac monitor: Yes    Interpretation:     Interpretation: abnormal    Rate:     ECG rate:  76    ECG rate assessment: normal    Rhythm:     Rhythm: sinus bradycardia    Ectopy:     Ectopy: PAC and PVCs    QRS:     QRS intervals:  Normal  Conduction:     Conduction: normal    ST segments:     ST segments:  Non-specific  T waves:     T waves: non-specific    CriticalCare Time    Date/Time: 2/13/2024 3:20 AM    Performed by: Meliton Samson Jr., DO  Authorized by: Meliton Samson Jr., DO    Critical care provider statement:     Critical care time (minutes):  45    Critical care time was exclusive of:  Separately billable procedures and treating other patients and teaching time    Critical care was necessary to treat or prevent imminent or life-threatening deterioration of the following conditions:  Circulatory failure    Critical care was time spent personally by me on the following activities:  Blood draw for specimens, obtaining history from patient or surrogate, development of treatment plan with patient or surrogate, discussions with consultants, evaluation of patient's response to treatment, examination of patient, interpretation of cardiac output measurements, ordering and performing treatments and interventions, ordering and review of laboratory studies, ordering and review of radiographic studies, review of old charts and re-evaluation of patient's condition    I assumed direction of critical care for this patient from another provider in my specialty: no             ED Course  ED Course as of 02/13/24 0323   Tue Feb 13, 2024   0129 CK  Normal    0130 Comprehensive metabolic panel(!)  Slight elevation in BUN and creatinine from baseline.  Possibly due to underlying dehydration.  IV fluids already given   0130 Magnesium  Normal    0130 CBC and differential  Normal   0130 B-Type Natriuretic Peptide(BNP)  Normal   0130 HS Troponin 0hr (reflex protocol)  Within normal limits but above the threshold of 5 so we will obtain a delta troponin.  This is probably rate related   0131 D-Dimer(!)  Slight elevation.  Unable to age-adjust.  Will obtain a CT for possible PE                               SBIRT 20yo+      Flowsheet Row Most Recent Value   Initial Alcohol Screen: US AUDIT-C     1. How often do you have a drink containing alcohol? 0 Filed at: 02/13/2024 0033   2. How many drinks containing alcohol do you have on a typical day you are drinking?  0 Filed at: 02/13/2024 0033   3a. Male UNDER 65: How often do you have five or more drinks on one occasion? 0 Filed at: 02/13/2024 0033   3b. FEMALE Any Age, or MALE 65+: How often do you have 4 or more drinks on one occassion? 0 Filed at: 02/13/2024 0033   Audit-C Score 0 Filed at: 02/13/2024 0033   LULY: How many times in the past year have you...    Used an illegal drug or used a prescription medication for non-medical reasons? Never Filed at: 02/13/2024 0033                      Medical Decision Making  Presents with SVT.  This was documented on his original EKG.  On my exam, the patient went into multiple different rhythms.  At 1 point, look like he was in a flutter, A-fib, SVT, couplets, with intermittent electrical alternans.  Patient remained stable throughout all those rhythms.  He denies any new over-the-counter medications, history of arrhythmias and has not missed any of his blood pressure medications.  Blood pressure is currently 141/102.  Since symptoms have been ongoing for 2 weeks, I do not feel that he needs immediate treatment as of now since we do not know the cause and he is currently stable.  Does have intermittent chest pressure but none currently.  Will check a broad set of labs, imaging depending on labs with either a chest x-ray or CT for possible PE, continue on the cardiac  monitor and will immediately treat if the patient becomes unstable.  Will give IV fluids for hydration and possible underlying clinical dehydration.    1:29 AM  Labs are overall normal with the exception of a mild elevation in his D-dimer, BUN and creatinine.   IV fluids given for possible underlying dehydration already.  Will obtain a CT for possible underlying PE since I cannot age-adjusted this.  Patient remains stable.  Patient and spouse updated.    2:29 AM  Got called to the patient's room.  Patient became lightheaded and his heart rate was in the 30s.  This was intermittent and went up to the 60s.  When I arrived in the room, heart rate was already improving into the 70s.  Blood pressure did drop to 85/65 but quickly came back to normal.  Heart rate now is around 114.  Repeat EKG, on my interpretation, shows sinus bradycardia with PAC and PVCs.  [place] text sent to cardiology over at [facility], electrophysiology, to discuss further.  I do not see signs of a heart block at this time but I do feel that the patient would be best served over there for potential intervention.    3:19 AM  After discussion with the attending on-call for cardiology at [facility], he determined that given his tachycardia and transient bradycardia, we can keep him here at [facility] for observation with evaluation by general cardiology in the morning.    Amount and/or Complexity of Data Reviewed  Labs: ordered. Decision-making details documented in ED Course.  Radiology: ordered and independent interpretation performed.    Risk  Prescription drug management.  Decision regarding hospitalization.             Disposition  Final diagnoses:   SVT (supraventricular tachycardia)   Bradycardia   Sinus tachycardia     Time reflects when diagnosis was documented in both MDM as applicable and the Disposition within this note       Time User Action Codes Description Comment    2/13/2024  3:16 AM Meliton Samson Add [I47.10]  SVT (supraventricular tachycardia)     2/13/2024  3:17 AM Meliton Samson [R00.1] Bradycardia     2/13/2024  3:17 AM Meliton Samson [R00.0] Sinus tachycardia           ED Disposition       ED Disposition   Admit    Condition   Stable    Date/Time   Tue Feb 13, 2024 0319    Comment   Case was discussed with PETEY and the patient's admission status was agreed to be Admission Status: observation status to the service of Dr. Bell .               Follow-up Information    None         Patient's Medications   Discharge Prescriptions    No medications on file       No discharge procedures on file.    PDMP Review         Value Time User    PDMP Reviewed  Yes 7/11/2022  9:12 AM Xenia Aly DO            ED Provider  Electronically Signed by             Meliton Samson Jr., DO  02/13/24 0326

## 2024-02-13 NOTE — PHYSICAL THERAPY NOTE
PT EVALUATION    Pt. Name: Markie Robert  Pt. Age: 58 y.o.  MRN: [id number]  LENGTH OF STAY: 0      Admitting Diagnoses:   Sinus tachycardia [R00.0]  Bradycardia [R00.1]  SVT (supraventricular tachycardia) [I47.10]  Chest pain [R07.9]    Past Medical History:   Diagnosis Date    Hyperlipidemia     Hypertension        Past Surgical History:   Procedure Laterality Date    HEMORRHOID SURGERY      HERNIA REPAIR      PA COLONOSCOPY FLX DX W/COLLJ SPEC WHEN PFRMD N/A 10/2/2018    Procedure: COLONOSCOPY;  Surgeon: Mo St MD;  Location: [facility];  Service: Gastroenterology    PA CYSTO/URETERO W/LITHOTRIPSY &INDWELL STENT INSRT Right 9/26/2023    Procedure: CYSTOSCOPY URETEROSCOPY WITH LITHOTRIPSY HOLMIUM LASER, AND INSERTION STENT URETERAL;  Surgeon: Shad Kenyon MD;  Location: [facility] OR;  Service: Urology       Imaging Studies:  CTA ED chest PE study   ED Interpretation by Meliton Samson Jr.,  (02/13 0258)   Per [facility]:    No acute pulmonary emboli.  Moderate atherosclerotic calcification of the coronary arteries.  Otherwise, no evidence of acute intrathoracic abnormality.      Final Result by Flaquito Castillo MD (02/13 0856)      1.  No acute pulmonary embolism.      2.  Small lung nodules, stable from September 2023. Based on current Fleischner Society 2017 Guidelines on incidental pulmonary nodule, no routine follow-up is needed if the patient is low risk. If the patient is high risk, optional follow-up chest CT at    12 months can be considered.      3.  Bilateral nonobstructing renal calculi      Major findings are consistent with the preliminary report from [facility] which was provided shortly after completion of the exam. Given the additional finding of lung nodules, the study was marked in EPIC for immediate notification.         Workstation performed: [id number]               02/13/24 0953   PT Last Visit   PT Visit Date 02/13/24   Note Type   Note type Evaluation   Pain Assessment    Pain Score No Pain   Restrictions/Precautions   Weight Bearing Precautions Per Order No   Other Precautions Telemetry   Home Living   Type of Home House   Home Layout One level;Stairs to enter with rails  (2STE; FOS to basement)   Bathroom Shower/Tub Tub/shower unit   Bathroom Toilet Standard   Bathroom Equipment Other (Comment)  (no DME)   Home Equipment Other (Comment)  (pt has access to RW, SPC)   Prior Function   Level of [place] Independent with functional mobility;Independent with ADLs;Independent with IADLS  (w/o AD)   Lives With Spouse   Receives Help From Family   Falls in the last 6 months 0   Vocational Full time employment   Comments (+)    General   Family/Caregiver Present Yes  (wife)   Cognition   Overall Cognitive Status WFL   Arousal/Participation Alert   Orientation Level Oriented X4   Following Commands Follows all commands and directions without difficulty   Comments pleasant & cooperative   Subjective   Subjective RN cleared pt for PT/OT evals as well as cleared to ambulate. Pt agreeable to PT/OT evals.   RUE Assessment   RUE Assessment   (refer to OT)   LUE Assessment   LUE Assessment   (refer to OT)   RLE Assessment   RLE Assessment WNL   LLE Assessment   LLE Assessment WNL   Coordination   Movements are Fluid and Coordinated 1   Sensation WFL   Bed Mobility   Supine to Sit 6  Modified independent   Additional items HOB elevated   Sit to Supine 6  Modified independent   Additional items HOB elevated   Transfers   Sit to Stand 7  Independent   Stand to Sit 7  Independent   Ambulation/Elevation   Gait pattern WNL   Gait Assistance 7  Independent   Assistive Device None   Distance 100'x1   Stair Management Assistance 6  Modified independent   Stair Management Technique One rail R   Number of Stairs 9   Ambulation/Elevation Additional Comments no gross LOB noted; pt notes baseline   Balance   Static Sitting Normal   Dynamic Sitting Normal   Static Standing Normal   Dynamic Standing  Normal   Ambulatory Normal   Endurance Deficit   Endurance Deficit No   Activity Tolerance   Activity Tolerance Patient tolerated treatment well   Medical Staff Made Aware OTR Dania   Nurse Made Aware yes, Jude   Assessment   Prognosis Excellent   Assessment Pt 58 y.o. male presented w/ c/o chest pain, palpitations & lightheadedness. Pt admitted for Chest pain, SVT & blocked PACs. Troponin normal. Pt referred to PT for mobility assessment & D/C planning. Please see flowsheet above re: home set-up, PLOF & objective findings during PT assessment. PTA, pt reports being I w/o AD.  On eval, pt demonstrates no decline in function to warrant skilled PT at this time. Pt  I/modified I w/ overall functional mobility including amb w/o AD & stair management. Balance, strength & gait WNL.  HR  bpm w/ activity. Pt asymptomatic t/o session. Pt states being at his functional baseline and states no concerns about going back home when medically cleared. The patient's AM-PAC Basic Mobility Inpatient Short Form Raw Score is 24. A Raw score of greater than 16 suggests the patient may benefit from discharge to home. Please also refer to the recommendation of the Physical Therapist for safe discharge planning. From PT standpoint, pt may return home when medically cleared. Will D/C PT. Nsg notified. Co-eval was necessary to complete this PT eval for the pt's best interest given pt's medical acuity & complexity.   Barriers to Discharge None   Goals   Patient Goals to go home   PT Treatment Day 0   Plan   Treatment/Interventions Spoke to nursing;OT;Family  (PT eval only)   PT Frequency Other (Comment)  (D/C PT)   Discharge Recommendation   Rehab Resource Intensity Level, PT No post-acute rehabilitation needs   AM-PAC Basic Mobility Inpatient   Turning in Flat Bed Without Bedrails 4   Lying on Back to Sitting on Edge of Flat Bed Without Bedrails 4   Moving Bed to Chair 4   Standing Up From Chair Using Arms 4   Walk in Room 4   Climb 3-5  Stairs With Railing 4   Basic Mobility Inpatient Raw Score 24   Basic Mobility Standardized Score 57.68   Highest Level Of Mobility   JH-HLM Goal 8: Walk 250 feet or more   JH-HLM Achieved 7: Walk 25 feet or more   End of Consult   Patient Position at End of Consult Supine;All needs within reach   End of Consult Comments Pt in stable condition. All needs in reach.   Hx/personal factors: co-morbidities, inaccessible home, dec caregiver support, and telemetry  Examination: assessed body system, balance, endurance, amb, D/C disposition & fall risk  Clinical: unpredictable (ongoing medical status)  Complexity: high    Ramo Boyce

## 2024-02-13 NOTE — PLAN OF CARE
Problem: PAIN - ADULT  Goal: Verbalizes/displays adequate comfort level or baseline comfort level  Description: Interventions:  - Encourage patient to monitor pain and request assistance  - Assess pain using appropriate pain scale  - Administer analgesics based on type and severity of pain and evaluate response  - Implement non-pharmacological measures as appropriate and evaluate response  - Consider cultural and social influences on pain and pain management  - Notify physician/advanced practitioner if interventions unsuccessful or patient reports new pain  Outcome: Progressing     Problem: SAFETY ADULT  Goal: Patient will remain free of falls  Description: INTERVENTIONS:  - Educate patient/family on patient safety including physical limitations  - Instruct patient to call for assistance with activity   - Consult OT/PT to assist with strengthening/mobility   - Keep Call bell within reach  - Keep bed low and locked with side rails adjusted as appropriate  - Keep care items and personal belongings within reach  - Initiate and maintain comfort rounds  - Make Fall Risk Sign visible to staff  - Apply yellow socks and bracelet for high fall risk patients  - Consider moving patient to room near nurses station  Outcome: Progressing  Goal: Maintain or return to baseline ADL function  Description: INTERVENTIONS:  -  Assess patient's ability to carry out ADLs; assess patient's baseline for ADL function and identify physical deficits which impact ability to perform ADLs (bathing, care of mouth/teeth, toileting, grooming, dressing, etc.)  - Assess/evaluate cause of self-care deficits   - Assess range of motion  - Assess patient's mobility; develop plan if impaired  - Assess patient's need for assistive devices and provide as appropriate  - Encourage maximum independence but intervene and supervise when necessary  - Involve family in performance of ADLs  - Assess for home care needs following discharge   - Consider OT consult  to assist with ADL evaluation and planning for discharge  - Provide patient education as appropriate  Outcome: Progressing  Goal: Maintains/Returns to pre admission functional level  Description: INTERVENTIONS:  - Perform AM-PAC 6 Click Basic Mobility/ Daily Activity assessment daily.  - Set and communicate daily mobility goal to care team and patient/family/caregiver.   - Collaborate with rehabilitation services on mobility goals if consulted  - Perform Range of Motion 3 times a day.  - Reposition patient every 2 hours.  - Dangle patient 3 times a day  - Stand patient 3 times a day  - Ambulate patient 3 times a day  - Out of bed to chair 3 times a day   - Out of bed for meals 3 times a day  - Out of bed for toileting  - Record patient progress and toleration of activity level   Outcome: Progressing     Problem: DISCHARGE PLANNING  Goal: Discharge to home or other facility with appropriate resources  Description: INTERVENTIONS:  - Identify barriers to discharge w/patient and caregiver  - Arrange for needed discharge resources and transportation as appropriate  - Identify discharge learning needs (meds, wound care, etc.)  - Arrange for interpretive services to assist at discharge as needed  - Refer to Case Management Department for coordinating discharge planning if the patient needs post-hospital services based on physician/advanced practitioner order or complex needs related to functional status, cognitive ability, or social support system  Outcome: Progressing     Problem: Knowledge Deficit  Goal: Patient/family/caregiver demonstrates understanding of disease process, treatment plan, medications, and discharge instructions  Description: Complete learning assessment and assess knowledge base.  Interventions:  - Provide teaching at level of understanding  - Provide teaching via preferred learning methods  Outcome: Progressing

## 2024-02-13 NOTE — ASSESSMENT & PLAN NOTE
Initial concern for SVT with subsequent bradycardia and then return to normal cardiac rate.  D/w cardiology and reviewed telemetry w/concern for atrial tachycardia.  -Cleared by EP to remain at [facility] for further formal evaluation w/cardiology  -tsh wnl no recent alcohol use or substance use no s/sx of infection  Cta chest pe study per [facility] negative for pe f/u formal read  -d/w cardiology we will start lopressor 25mg bid monitor on tele.    -Check echocardiogram, uds monitor on tele

## 2024-02-13 NOTE — PLAN OF CARE
Problem: PAIN - ADULT  Goal: Verbalizes/displays adequate comfort level or baseline comfort level  Description: Interventions:  - Encourage patient to monitor pain and request assistance  - Assess pain using appropriate pain scale  - Administer analgesics based on type and severity of pain and evaluate response  - Implement non-pharmacological measures as appropriate and evaluate response  - Consider cultural and social influences on pain and pain management  - Notify physician/advanced practitioner if interventions unsuccessful or patient reports new pain  2/13/2024 1539 by Jude Mitchell RN  Outcome: Progressing  2/13/2024 1539 by Jude Mitchell RN  Outcome: Progressing     Problem: SAFETY ADULT  Goal: Patient will remain free of falls  Description: INTERVENTIONS:  - Educate patient/family on patient safety including physical limitations  - Instruct patient to call for assistance with activity   - Consult OT/PT to assist with strengthening/mobility   - Keep Call bell within reach  - Keep bed low and locked with side rails adjusted as appropriate  - Keep care items and personal belongings within reach  - Initiate and maintain comfort rounds  - Make Fall Risk Sign visible to staff  - Apply yellow socks and bracelet for high fall risk patients  - Consider moving patient to room near nurses station  2/13/2024 1539 by Jude Mitchell RN  Outcome: Progressing  2/13/2024 1539 by Jude Mitchell RN  Outcome: Progressing  Goal: Maintain or return to baseline ADL function  Description: INTERVENTIONS:  -  Assess patient's ability to carry out ADLs; assess patient's baseline for ADL function and identify physical deficits which impact ability to perform ADLs (bathing, care of mouth/teeth, toileting, grooming, dressing, etc.)  - Assess/evaluate cause of self-care deficits   - Assess range of motion  - Assess patient's mobility; develop plan if impaired  - Assess patient's need for assistive devices and provide as  appropriate  - Encourage maximum independence but intervene and supervise when necessary  - Involve family in performance of ADLs  - Assess for home care needs following discharge   - Consider OT consult to assist with ADL evaluation and planning for discharge  - Provide patient education as appropriate  2/13/2024 1539 by Jude Mitchell RN  Outcome: Progressing  2/13/2024 1539 by Jude Mitchell RN  Outcome: Progressing  Goal: Maintains/Returns to pre admission functional level  Description: INTERVENTIONS:  - Perform AM-PAC 6 Click Basic Mobility/ Daily Activity assessment daily.  - Set and communicate daily mobility goal to care team and patient/family/caregiver.   - Collaborate with rehabilitation services on mobility goals if consulted  - Perform Range of Motion 3 times a day.  - Reposition patient every 2 hours.  - Dangle patient 3 times a day  - Stand patient 3 times a day  - Ambulate patient 3 times a day  - Out of bed to chair 3 times a day   - Out of bed for meals 3 times a day  - Out of bed for toileting  - Record patient progress and toleration of activity level   2/13/2024 1539 by Jude Mitchell RN  Outcome: Progressing  2/13/2024 1539 by Jude Mitchell RN  Outcome: Progressing     Problem: DISCHARGE PLANNING  Goal: Discharge to home or other facility with appropriate resources  Description: INTERVENTIONS:  - Identify barriers to discharge w/patient and caregiver  - Arrange for needed discharge resources and transportation as appropriate  - Identify discharge learning needs (meds, wound care, etc.)  - Arrange for interpretive services to assist at discharge as needed  - Refer to Case Management Department for coordinating discharge planning if the patient needs post-hospital services based on physician/advanced practitioner order or complex needs related to functional status, cognitive ability, or social support system  2/13/2024 1539 by Jude Mitchell RN  Outcome: Progressing  2/13/2024 1539 by Jude  DOMINIC Mitchell  Outcome: Progressing     Problem: Knowledge Deficit  Goal: Patient/family/caregiver demonstrates understanding of disease process, treatment plan, medications, and discharge instructions  Description: Complete learning assessment and assess knowledge base.  Interventions:  - Provide teaching at level of understanding  - Provide teaching via preferred learning methods  2/13/2024 1539 by Jude Mitchell RN  Outcome: Progressing  2/13/2024 1539 by Jude Mitchell RN  Outcome: Progressing

## 2024-02-14 ENCOUNTER — HOSPITAL ENCOUNTER (INPATIENT)
Facility: HOSPITAL | Age: 59
LOS: 3 days | Discharge: HOME | DRG: 274 | End: 2024-02-17
Attending: INTERNAL MEDICINE | Admitting: INTERNAL MEDICINE
Payer: COMMERCIAL

## 2024-02-14 VITALS
HEART RATE: 86 BPM | RESPIRATION RATE: 16 BRPM | DIASTOLIC BLOOD PRESSURE: 66 MMHG | BODY MASS INDEX: 24.14 KG/M2 | TEMPERATURE: 98 F | HEIGHT: 69 IN | SYSTOLIC BLOOD PRESSURE: 112 MMHG | OXYGEN SATURATION: 96 % | WEIGHT: 163 LBS

## 2024-02-14 DIAGNOSIS — R10.13 DYSPEPSIA: ICD-10-CM

## 2024-02-14 DIAGNOSIS — R00.1 SYMPTOMATIC BRADYCARDIA: ICD-10-CM

## 2024-02-14 DIAGNOSIS — I45.5 SINUS PAUSE: Primary | ICD-10-CM

## 2024-02-14 DIAGNOSIS — Z98.890 S/P RF ABLATION OPERATION FOR ARRHYTHMIA: ICD-10-CM

## 2024-02-14 DIAGNOSIS — Z86.79 S/P RF ABLATION OPERATION FOR ARRHYTHMIA: ICD-10-CM

## 2024-02-14 DIAGNOSIS — I47.19 PAROXYSMAL ATRIAL TACHYCARDIA: ICD-10-CM

## 2024-02-14 LAB
ANION GAP SERPL CALCULATED.3IONS-SCNC: 5 MMOL/L
ATRIAL RATE: 37 BPM
ATRIAL RATE: 37 BPM
ATRIAL RATE: 38 BPM
BUN SERPL-MCNC: 18 MG/DL (ref 5–25)
CALCIUM SERPL-MCNC: 9.2 MG/DL (ref 8.4–10.2)
CHLORIDE SERPL-SCNC: 109 MMOL/L (ref 96–108)
CO2 SERPL-SCNC: 26 MMOL/L (ref 21–32)
CREAT SERPL-MCNC: 1 MG/DL (ref 0.6–1.3)
GFR SERPL CREATININE-BSD FRML MDRD: 82 ML/MIN/1.73SQ M
GLUCOSE SERPL-MCNC: 96 MG/DL (ref 65–140)
P AXIS: 70 DEGREES
P AXIS: 71 DEGREES
P AXIS: 71 DEGREES
PLATELET # BLD AUTO: 246 THOUSANDS/UL (ref 149–390)
PMV BLD AUTO: 10.7 FL (ref 8.9–12.7)
POTASSIUM SERPL-SCNC: 3.8 MMOL/L (ref 3.5–5.3)
PR INTERVAL: 164 MS
PR INTERVAL: 164 MS
PR INTERVAL: 166 MS
QRS AXIS: 60 DEGREES
QRS AXIS: 62 DEGREES
QRS AXIS: 64 DEGREES
QRSD INTERVAL: 90 MS
QRSD INTERVAL: 92 MS
QRSD INTERVAL: 92 MS
QT INTERVAL: 464 MS
QT INTERVAL: 464 MS
QT INTERVAL: 466 MS
QTC INTERVAL: 364 MS
QTC INTERVAL: 365 MS
QTC INTERVAL: 368 MS
SODIUM SERPL-SCNC: 140 MMOL/L (ref 135–147)
T WAVE AXIS: 40 DEGREES
T WAVE AXIS: 44 DEGREES
T WAVE AXIS: 55 DEGREES
VENTRICULAR RATE: 37 BPM
VENTRICULAR RATE: 37 BPM
VENTRICULAR RATE: 38 BPM

## 2024-02-14 PROCEDURE — 99214 OFFICE O/P EST MOD 30 MIN: CPT

## 2024-02-14 PROCEDURE — 93010 ELECTROCARDIOGRAM REPORT: CPT | Performed by: STUDENT IN AN ORGANIZED HEALTH CARE EDUCATION/TRAINING PROGRAM

## 2024-02-14 PROCEDURE — 93005 ELECTROCARDIOGRAM TRACING: CPT

## 2024-02-14 PROCEDURE — 99232 SBSQ HOSP IP/OBS MODERATE 35: CPT | Performed by: PHYSICIAN ASSISTANT

## 2024-02-14 PROCEDURE — 85049 AUTOMATED PLATELET COUNT: CPT | Performed by: INTERNAL MEDICINE

## 2024-02-14 PROCEDURE — 99223 1ST HOSP IP/OBS HIGH 75: CPT | Performed by: INTERNAL MEDICINE

## 2024-02-14 PROCEDURE — 80048 BASIC METABOLIC PNL TOTAL CA: CPT | Performed by: INTERNAL MEDICINE

## 2024-02-14 PROCEDURE — 99239 HOSP IP/OBS DSCHRG MGMT >30: CPT | Performed by: PHYSICIAN ASSISTANT

## 2024-02-14 RX ORDER — ACETAMINOPHEN 325 MG/1
650 TABLET ORAL EVERY 6 HOURS PRN
Status: DISCONTINUED | OUTPATIENT
Start: 2024-02-14 | End: 2024-02-17 | Stop reason: HOSPADM

## 2024-02-14 RX ORDER — ONDANSETRON 2 MG/ML
4 INJECTION INTRAMUSCULAR; INTRAVENOUS EVERY 6 HOURS PRN
Status: DISCONTINUED | OUTPATIENT
Start: 2024-02-14 | End: 2024-02-17 | Stop reason: HOSPADM

## 2024-02-14 RX ORDER — HEPARIN SODIUM 5000 [USP'U]/ML
5000 INJECTION, SOLUTION INTRAVENOUS; SUBCUTANEOUS EVERY 8 HOURS SCHEDULED
Status: CANCELLED | OUTPATIENT
Start: 2024-02-14

## 2024-02-14 RX ORDER — PRAVASTATIN SODIUM 40 MG
40 TABLET ORAL
Status: CANCELLED | OUTPATIENT
Start: 2024-02-14

## 2024-02-14 RX ORDER — MAGNESIUM HYDROXIDE/ALUMINUM HYDROXICE/SIMETHICONE 120; 1200; 1200 MG/30ML; MG/30ML; MG/30ML
30 SUSPENSION ORAL EVERY 4 HOURS PRN
Status: CANCELLED | OUTPATIENT
Start: 2024-02-14

## 2024-02-14 RX ORDER — ACETAMINOPHEN 325 MG/1
650 TABLET ORAL EVERY 6 HOURS PRN
Status: CANCELLED | OUTPATIENT
Start: 2024-02-14

## 2024-02-14 RX ORDER — PRAVASTATIN SODIUM 20 MG
40 TABLET ORAL
Status: DISCONTINUED | OUTPATIENT
Start: 2024-02-15 | End: 2024-02-17 | Stop reason: HOSPADM

## 2024-02-14 RX ORDER — MAGNESIUM HYDROXIDE/ALUMINUM HYDROXICE/SIMETHICONE 120; 1200; 1200 MG/30ML; MG/30ML; MG/30ML
30 SUSPENSION ORAL EVERY 4 HOURS PRN
Status: DISCONTINUED | OUTPATIENT
Start: 2024-02-14 | End: 2024-02-17 | Stop reason: HOSPADM

## 2024-02-14 RX ORDER — HEPARIN SODIUM 5000 [USP'U]/ML
5000 INJECTION, SOLUTION INTRAVENOUS; SUBCUTANEOUS EVERY 8 HOURS SCHEDULED
Status: DISCONTINUED | OUTPATIENT
Start: 2024-02-14 | End: 2024-02-17 | Stop reason: HOSPADM

## 2024-02-14 RX ORDER — ONDANSETRON 2 MG/ML
4 INJECTION INTRAMUSCULAR; INTRAVENOUS EVERY 6 HOURS PRN
Status: CANCELLED | OUTPATIENT
Start: 2024-02-14

## 2024-02-14 RX ADMIN — HEPARIN SODIUM 5000 UNITS: 5000 INJECTION INTRAVENOUS; SUBCUTANEOUS at 12:27

## 2024-02-14 RX ADMIN — METOPROLOL TARTRATE 25 MG: 25 TABLET, FILM COATED ORAL at 18:37

## 2024-02-14 RX ADMIN — HEPARIN SODIUM 5000 UNITS: 5000 INJECTION INTRAVENOUS; SUBCUTANEOUS at 06:14

## 2024-02-14 RX ADMIN — METOPROLOL TARTRATE 25 MG: 25 TABLET, FILM COATED ORAL at 06:15

## 2024-02-14 RX ADMIN — METOPROLOL TARTRATE 25 MG: 25 TABLET, FILM COATED ORAL at 12:27

## 2024-02-14 RX ADMIN — HEPARIN SODIUM 5000 UNITS: 5000 INJECTION INTRAVENOUS; SUBCUTANEOUS at 22:49

## 2024-02-14 RX ADMIN — PRAVASTATIN SODIUM 40 MG: 40 TABLET ORAL at 15:30

## 2024-02-14 NOTE — PROGRESS NOTES
[facility]  Progress Note  Name: Markie Robert I  MRN: [id number]  Unit/Bed#: [place] [address] -01 I Date of Admission: 2/13/2024   Date of Service: 2/14/2024 I Hospital Day: 0    Assessment/Plan   * Paroxysmal atrial tachycardia  Assessment & Plan  Patient presented to the hospital, concern for SVT on initial EKG  Monitored on telemetry, now appears to have atrial tachycardia with frequent pauses  Started on metoprolol 25 mg every 6 hours, developed bradycardia with sustained heart rate in the 30s  Cardiology following  Plan for transfer to [place] for electrophysiology evaluation  Echocardiogram completed yesterday with EF 56%    Hypertension  Assessment & Plan  Amlodipine and valsartan on hold  Currently on metoprolol per cardiology  BP is remained well-controlled    HLD (hyperlipidemia)  Assessment & Plan  Continue statin             VTE Pharmacologic Prophylaxis:   Moderate Risk (Score 3-4) - Pharmacological DVT Prophylaxis Ordered: heparin.    Mobility:   Basic Mobility Inpatient Raw Score: 24  -HLM Goal: 8: Walk 250 feet or more  JH-HLM Achieved: 1: Laying in bed  HLM Goal NOT achieved. Continue with multidisciplinary rounding and encourage appropriate mobility to improve upon HLM goals.    Patient Centered Rounds: I performed bedside rounds with nursing staff today.   Discussions with Specialists or Other Care Team Provider: cardiology     Total Time Spent on Date of Encounter in care of patient:  This time was spent on one or more of the following: performing physical exam; counseling and coordination of care; obtaining or reviewing history; documenting in the medical record; reviewing/ordering tests, medications or procedures; communicating with other healthcare professionals and discussing with patient's family/caregivers.    Current Length of Stay: 0 day(s)  Current Patient Status: Observation   Certification Statement: The patient, admitted on an observation basis, will now  require > 2 midnight hospital stay due to atrial tachycardia requiring EP eval and treatment   Discharge Plan:  transfer to [facility] pending     Code Status: Level 1 - Full Code    Subjective:   Patient notes he developed palpitations throughout the night into this morning.  Describes chest pressure.  Denies any lightheadedness or dizziness.  Agreeable for transfer to [place] for electrophysiology evaluation.    Objective:     Vitals:   Temp (24hrs), Av.7 °F (36.5 °C), Min:97.6 °F (36.4 °C), Max:97.9 °F (36.6 °C)    Temp:  [97.6 °F (36.4 °C)-97.9 °F (36.6 °C)] 97.9 °F (36.6 °C)  HR:  [] 86  Resp:  [17-19] 19  BP: (104-118)/(69-89) 104/69  SpO2:  [96 %] 96 %  Body mass index is 24.07 kg/m².     Input and Output Summary (last 24 hours):   No intake or output data in the 24 hours ending 24 1257    Physical Exam:   Physical Exam  Vitals reviewed.   Constitutional:       General: He is not in acute distress.  HENT:      Head: Normocephalic and atraumatic.   Eyes:      General: No scleral icterus.     Conjunctiva/sclera: Conjunctivae normal.   Cardiovascular:      Rate and Rhythm: Rhythm irregular.      Heart sounds: No murmur heard.  Pulmonary:      Effort: Pulmonary effort is normal. No respiratory distress.      Breath sounds: Normal breath sounds.   Abdominal:      General: Bowel sounds are normal. There is no distension.      Palpations: Abdomen is soft.      Tenderness: There is no abdominal tenderness.   Musculoskeletal:      Cervical back: Neck supple.      Right lower leg: No edema.      Left lower leg: No edema.   Skin:     General: Skin is warm and dry.   Neurological:      Mental Status: He is alert and oriented to person, place, and time.   Psychiatric:         Mood and Affect: Mood normal.         Behavior: Behavior normal.          Additional Data:     Labs:  Results from last 7 days   Lab Units 24  0046   WBC Thousand/uL 7.19   HEMOGLOBIN g/dL 15.9   HEMATOCRIT % 48.0   PLATELETS  Thousands/uL 248   NEUTROS PCT % 65   LYMPHS PCT % 22   MONOS PCT % 12   EOS PCT % 1     Results from last 7 days   Lab Units 02/14/24  0516 02/13/24  1010 02/13/24  0046   SODIUM mmol/L 140   < > 139   POTASSIUM mmol/L 3.8   < > 3.9   CHLORIDE mmol/L 109*   < > 106   CO2 mmol/L 26   < > 23   BUN mg/dL 18   < > 28*   CREATININE mg/dL 1.00   < > 1.30   ANION GAP mmol/L 5   < > 10   CALCIUM mg/dL 9.2   < > 9.8   ALBUMIN g/dL  --   --  4.5   TOTAL BILIRUBIN mg/dL  --   --  0.51   ALK PHOS U/L  --   --  52   ALT U/L  --   --  24   AST U/L  --   --  21   GLUCOSE RANDOM mg/dL 96   < > 89    < > = values in this interval not displayed.                       Lines/Drains:  Invasive Devices       Peripheral Intravenous Line  Duration             Peripheral IV 02/13/24 Distal;Dorsal (posterior);Left Forearm 1 day    Peripheral IV 02/13/24 Left Antecubital 1 day              Drain  Duration             Ureteral Internal Stent Right ureter 6 Fr. 140 days                      Telemetry:  Telemetry Orders (From admission, onward)               24 Hour Telemetry Monitoring  Continuous x 24 Hours (Telem)        Question:  Reason for 24 Hour Telemetry  Answer:  Arrhythmias requiring acute medical intervention / PPM or ICD malfunction                     Telemetry Reviewed:  atrial tachycardia with frequent pauses   Indication for Continued Telemetry Use: Arrthymias requiring medical therapy             Imaging: No pertinent imaging reviewed.    Recent Cultures (last 7 days):         Last 24 Hours Medication List:   Current Facility-Administered Medications   Medication Dose Route Frequency Provider Last Rate    acetaminophen  650 mg Oral Q6H PRN Clem Bell DO      aluminum-magnesium hydroxide-simethicone  30 mL Oral Q4H PRN Clem Bell DO      heparin (porcine)  5,000 Units Subcutaneous Q8H [facility] Sylvia Dye PA-C      metoprolol tartrate  25 mg Oral Q6H Emanuel Oakes,       ondansetron  4 mg Intravenous  Q6H PRN Sylvia Dye PA-C      pravastatin  40 mg Oral Daily With Dinner Sylvia Dye PA-C          Today, Patient Was Seen By: Giulia Cates PA-C    **Please Note: This note may have been constructed using a voice recognition system.**

## 2024-02-14 NOTE — CASE MANAGEMENT
Case Management Discharge Planning Note    Patient name Markie Robert  Location [facility] M* MRN [id number]  : 1965 Date 2024         OBJECTIVE:            Current admission status: Observation     DISCHARGE DETAILS:                                                    Treatment Team Recommendation: Acute Hospital Transfer  Discharge Destination Plan:: Acute Hospital Transfer ([facility])  Transport at Discharge : BLS Ambulance                                      Additional Comments: Notified of pt to be transferred to [facility] due to AT in need of atrial ablation +/- PPM per cardiology.  CMN completed and in pt's blue folder

## 2024-02-14 NOTE — ASSESSMENT & PLAN NOTE
Patient presented to the hospital, concern for SVT on initial EKG  Monitored on telemetry, now appears to have atrial tachycardia with frequent pauses  Started on metoprolol 25 mg every 6 hours, developed bradycardia with sustained heart rate in the 30s  Cardiology following  Plan for transfer to [place] for electrophysiology evaluation  Echocardiogram completed yesterday with EF 56%

## 2024-02-14 NOTE — UTILIZATION REVIEW
Initial Clinical Review    Admission: Date/Time/Statement:   Admission Orders (From admission, onward)       Ordered        02/13/24 0332  Place in Observation  Once                          Orders Placed This Encounter   Procedures    Place in Observation     Standing Status:   Standing     Number of Occurrences:   1     Order Specific Question:   Level of Care     Answer:   Med Surg [16]     ED Arrival Information       Expected   -    Arrival   2/13/2024 00:26    Acuity   Urgent              Means of arrival   Walk-In    Escorted by   Family Member    Service   Hospitalist    Admission type   Emergency              Arrival complaint   Chest Pain             Chief Complaint   Patient presents with    Chest Pain     Pt reports chest pressure that started 2 weeks ago with intermittent episodes that include palpitations.  Tonight, pressure was more severe and pt though he might pass out         Initial Presentation: 58 y.o. male presents to the ED from home with c/o increasing in intensity and frequency of palpitations, dizziness, lightheadedness, L chest tightness, recently stopped statin d/t arthralgias x 2 wks.  PMH: HTN, HLD, rate ETOH use.  In the ED he is tachycardic, HTN and concern for tachycardia and possible svt with conversion with bradycardia back to a normal rate. The degree of bradycardia in the 30s lead to d/w EP who recommended formal evaluation w/cardiology given sinus bradycardia w/o overt block as likely a conversion pause. After d/w cardiology recommendation for treatment for possible atrial tachycardia commenced.  Labs - elevated BUN, negative troponins.  ECG - initially SVT, then SB w PAC, ST w/ bigem PACs, Echo completed.  Imaging - no PE.  Treated IV fluids.  On exam irregular tachycardia.  He is admitted to OBSERVATION status with Atrial tachycardia  - uncontrolled, metoprolol q 6 hr, tele.  Hold home meds except statin.      2/13 Cardio Consult - PAT, blocked PACs, precordial CP, HTN,  dyslipidemia - continue tele, showing conversion pauses with A tachycacdia and ST.  Lopressor BID, uptitrate as needed and BP tolerates, UDS (negative), doubt ACS. Continue tele, statin.     Date: 2/14:  Tele - At tachy w/ freq pauses. Will transfer to [facility] for EPS consult.  Holding Amlodipine, Valsartan, continues on Metoprolol. On exam reporting palpitations, chest pressure, no lightheadedness, has irregular rhythm.      ED Triage Vitals   Temperature Pulse Respirations Blood Pressure SpO2   02/13/24 0037 02/13/24 0034 02/13/24 0034 02/13/24 0034 02/13/24 0034   97.9 °F (36.6 °C) (!) 127 16 (!) 141/102 98 %      Temp Source Heart Rate Source Patient Position - Orthostatic VS BP Location FiO2 (%)   02/13/24 0037 02/13/24 0034 02/13/24 0034 02/13/24 0034 --   Oral Monitor Lying Right arm       Pain Score       02/13/24 0034       4          Wt Readings from Last 1 Encounters:   02/13/24 73.9 kg (163 lb)     Additional Vital Signs:   Date/Time Temp Pulse Resp BP MAP (mmHg) SpO2 O2 Device Patient Position - Orthostatic VS   02/14/24 09:22:53 -- -- 19 104/69 81 -- -- --   02/14/24 07:27:39 97.9 °F (36.6 °C) -- 17 118/89 99 -- -- --   02/14/24 06:17:21 -- 86 -- 115/72 86 96 % -- --   02/14/24 0615 -- 86 -- 115/72 -- -- -- --   02/14/24 0000 -- -- -- 110/81 91 -- -- --   02/13/24 2359 -- 95 -- 110/81 -- -- -- --   02/13/24 23:41:14 -- -- 18 118/85 96 -- -- Lying   02/13/24 19:33:52 -- 114 Abnormal  -- -- -- 96 % None (Room air) --   02/13/24 19:28:42 97.6 °F (36.4 °C) -- -- 114/82 93 -- None (Room air) Lying   02/13/24 19:27:25 97.6 °F (36.4 °C) -- 18 114/82 93 -- -- --   02/13/24 10:09:04 98 °F (36.7 °C) -- 20 99/64 76 -- None (Room air) Lying   02/13/24 0930 -- 59 -- 124/89 -- -- -- --   02/13/24 0439 -- -- -- -- -- -- None (Room air) --   02/13/24 04:18:49 98 °F (36.7 °C) 59 18 124/89 101 94 % None (Room air) Lying   02/13/24 0400 -- 112 Abnormal  20 113/78 90 95 % -- --   02/13/24 0345 -- 111 Abnormal  21 107/78 89  94 % -- --   02/13/24 0330 97.9 °F (36.6 °C) 120 Abnormal  27 Abnormal  111/85 93 96 % None (Room air) Lying   02/13/24 0315 -- 112 Abnormal  20 115/87 98 97 % -- --   02/13/24 0300 -- 111 Abnormal  20 112/87 97 97 % None (Room air) Lying   02/13/24 0245 -- 122 Abnormal  20 108/79 90 95 % None (Room air) Lying   02/13/24 0230 -- 118 Abnormal  20 105/82 90 96 % None (Room air) Lying   02/13/24 0215 -- 40 Abnormal    20 85/65 Abnormal  71 95 % None (Room air) Lying   Pulse: [place] made aware at 02/13/24 0215   02/13/24 0200 -- 116 Abnormal  22 125/88 103 96 % None (Room air) Lying   02/13/24 0130 -- 111 Abnormal  22 106/81 88 95 % None (Room air) Lying   02/13/24 0115 -- 126 Abnormal  22 124/90 102 95 % None (Room air) Lying   02/13/24 0100 -- 133 Abnormal  20 129/91 108 95 % None (Room air) Lying   02/13/24 0045 -- 118 Abnormal  20 141/102 Abnormal  119 95 % None (Room air) Lying       Pertinent Labs/Diagnostic Test Results:     2/13 ECG - Supraventricular tachycardia  ST & T wave abnormality, consider inferior ischemia  Abnormal ECG  2/13 ECG - Sinus bradycardia with occasional and consecutive Premature atrial complexes some with aberrancy  Abnormal ECG    2/13  ECG - Sinus tachycardia with 1st degree A-V block with Blocked Premature atrial complexes  Otherwise normal ECG    2/13  Echo -   Left Ventricle: Left ventricular cavity size is normal. Wall thickness is normal. The left ventricular ejection fraction is 56% by biplane measurement. Systolic function is normal. Wall motion is normal.    Aortic Valve: The aortic valve is trileaflet.  There is aortic sclerosis.    Mitral Valve: There is mild regurgitation.    Tricuspid Valve: There is mild regurgitation. The right ventricular systolic pressure is normal. The estimated right ventricular systolic pressure is 13.00 mmHg.    Aorta: The aortic root is normal in size. The ascending aorta is mildly dilated. The aortic root is 3.40 cm. The ascending aorta is 4 cm.      2/13 ECG - Sinus rhythm with Premature atrial complexes in a pattern of bigeminy  Otherwise normal ECG    2/14 ECG x3- Sinus rhythm with 2:1 A-V conduction     RADIOLOGY RESULTS   Final Result by  (02/14 0921)      CTA ED chest PE study      1.  No acute pulmonary embolism.      2.  Small lung nodules, stable from September 2023. Based on current Fleischner Society 2017 Guidelines on incidental pulmonary nodule, no routine follow-up is needed if the patient is low risk. If the patient is high risk, optional follow-up chest CT at    12 months can be considered.      3.  Bilateral nonobstructing renal calculi     Results from last 7 days   Lab Units 02/13/24  0101   SARS-COV-2  Negative     Results from last 7 days   Lab Units 02/13/24  0046   WBC Thousand/uL 7.19   HEMOGLOBIN g/dL 15.9   HEMATOCRIT % 48.0   PLATELETS Thousands/uL 248   NEUTROS ABS Thousands/µL 4.59         Results from last 7 days   Lab Units 02/14/24  0516 02/13/24  1010 02/13/24  0101 02/13/24  0046   SODIUM mmol/L 140 141  --  139   POTASSIUM mmol/L 3.8 4.0  --  3.9   CHLORIDE mmol/L 109* 109*  --  106   CO2 mmol/L 26 25  --  23   ANION GAP mmol/L 5 7  --  10   BUN mg/dL 18 21  --  28*   CREATININE mg/dL 1.00 1.04  --  1.30   EGFR ml/min/1.73sq m 82 78  --  60   CALCIUM mg/dL 9.2 9.5  --  9.8   MAGNESIUM mg/dL  --   --  2.3  --      Results from last 7 days   Lab Units 02/13/24  0046   AST U/L 21   ALT U/L 24   ALK PHOS U/L 52   TOTAL PROTEIN g/dL 6.9   ALBUMIN g/dL 4.5   TOTAL BILIRUBIN mg/dL 0.51         Results from last 7 days   Lab Units 02/14/24  0516 02/13/24  1010 02/13/24  0046   GLUCOSE RANDOM mg/dL 96 83 89     Results from last 7 days   Lab Units 02/13/24  0101   CK TOTAL U/L 182     Results from last 7 days   Lab Units 02/13/24  0430 02/13/24  0242 02/13/24  0046   HS TNI 0HR ng/L  --   --  9   HS TNI 2HR ng/L  --  10  --    HSTNI D2 ng/L  --  1  --    HS TNI 4HR ng/L 10  --   --    HSTNI D4 ng/L 1  --   --      Results from last 7  days   Lab Units 02/13/24  0101   D-DIMER QUANTITATIVE ug/ml FEU 0.88*         Results from last 7 days   Lab Units 02/13/24  0101   TSH 3RD GENERATON uIU/mL 4.014                     Results from last 7 days   Lab Units 02/13/24  0101   BNP pg/mL 59     Results from last 7 days   Lab Units 02/13/24  0101   INFLUENZA A PCR  Negative   INFLUENZA B PCR  Negative   RSV PCR  Negative         Results from last 7 days   Lab Units 02/13/24  1010   AMPH/METH  Negative   BARBITURATE UR  Negative   BENZODIAZEPINE UR  Negative   COCAINE UR  Negative   METHADONE URINE  Negative   OPIATE UR  Negative   PCP UR  Negative   THC UR  Negative     ED Treatment:   Medication Administration from 02/13/2024 0026 to 02/13/2024 0412         Date/Time Order Dose Route Action     02/13/2024 0101 EST sodium chloride 0.9 % bolus 1,000 mL 1,000 mL Intravenous New Bag     02/13/2024 0156 EST iohexol (OMNIPAQUE) 350 MG/ML injection (MULTI-DOSE) 81 mL 81 mL Intravenous Given          Past Medical History:   Diagnosis Date    Hyperlipidemia     Hypertension      Present on Admission:   Hypertension   HLD (hyperlipidemia)      Admitting Diagnosis: Sinus tachycardia [R00.0]  Bradycardia [R00.1]  SVT (supraventricular tachycardia) [I47.10]  Chest pain [R07.9]  Age/Sex: 58 y.o. male  Admission Orders:  Scheduled Medications:  heparin (porcine), 5,000 Units, Subcutaneous, Q8H SYED  metoprolol tartrate, 25 mg, Oral, Q6H  pravastatin, 40 mg, Oral, Daily With Dinner      Continuous IV Infusions:     PRN Meds:  acetaminophen, 650 mg, Oral, Q6H PRN  aluminum-magnesium hydroxide-simethicone, 30 mL, Oral, Q4H PRN  ondansetron, 4 mg, Intravenous, Q6H PRN    Tele  VS q 4 hr   Strict bedrest  Regular diet   Plan to transfer to [facility] when bed available  IP CONSULT TO CARDIOLOGY    Network Utilization Review Department  ATTENTION: Please call with any questions or concerns to [phone] and carefully listen to the prompts so that you are directed to the right  person. All voicemails are confidential.   For Discharge needs, contact Care Management DC Support Team at [phone] opt. 2  Send all requests for admission clinical reviews, approved or denied determinations and any other requests to dedicated fax number below belonging to the campus where the patient is receiving treatment. List of dedicated fax numbers for the Facilities:  FACILITY NAME UR FAX NUMBER   ADMISSION DENIALS (Administrative/Medical Necessity) [phone]   DISCHARGE SUPPORT TEAM (NETWORK) [phone]   PARENT CHILD HEALTH (Maternity/NICU/Pediatrics) [phone]   [facility] [phone]   [facility] [phone]   [facility] [phone]   [facility] [phone]   Critical access hospital [phone]   [facility] [phone]   [facility] [phone]   [facility] [phone]   [facility] [phone]   [facility] [phone]   [facility] [phone]   [facility] [phone]

## 2024-02-14 NOTE — PLAN OF CARE
Problem: PAIN - ADULT  Goal: Verbalizes/displays adequate comfort level or baseline comfort level  Description: Interventions:  - Encourage patient to monitor pain and request assistance  - Assess pain using appropriate pain scale  - Administer analgesics based on type and severity of pain and evaluate response  - Implement non-pharmacological measures as appropriate and evaluate response  - Consider cultural and social influences on pain and pain management  - Notify physician/advanced practitioner if interventions unsuccessful or patient reports new pain  Outcome: Progressing     Problem: SAFETY ADULT  Goal: Patient will remain free of falls  Description: INTERVENTIONS:  - Educate patient/family on patient safety including physical limitations  - Instruct patient to call for assistance with activity   - Consult OT/PT to assist with strengthening/mobility   - Keep Call bell within reach  - Keep bed low and locked with side rails adjusted as appropriate  - Keep care items and personal belongings within reach  - Initiate and maintain comfort rounds  - Make Fall Risk Sign visible to staff  - Apply yellow socks and bracelet for high fall risk patients  - Consider moving patient to room near nurses station  Outcome: Progressing  Goal: Maintain or return to baseline ADL function  Description: INTERVENTIONS:  -  Assess patient's ability to carry out ADLs; assess patient's baseline for ADL function and identify physical deficits which impact ability to perform ADLs (bathing, care of mouth/teeth, toileting, grooming, dressing, etc.)  - Assess/evaluate cause of self-care deficits   - Assess range of motion  - Assess patient's mobility; develop plan if impaired  - Assess patient's need for assistive devices and provide as appropriate  - Encourage maximum independence but intervene and supervise when necessary  - Involve family in performance of ADLs  - Assess for home care needs following discharge   - Consider OT consult  to assist with ADL evaluation and planning for discharge  - Provide patient education as appropriate  Outcome: Progressing  Goal: Maintains/Returns to pre admission functional level  Description: INTERVENTIONS:  - Perform AM-PAC 6 Click Basic Mobility/ Daily Activity assessment daily.  - Set and communicate daily mobility goal to care team and patient/family/caregiver.   - Collaborate with rehabilitation services on mobility goals if consulted  - Perform Range of Motion 3 times a day.  - Ambulate patient 3 times a day  - Out of bed for toileting  - Record patient progress and toleration of activity level   Outcome: Progressing     Problem: DISCHARGE PLANNING  Goal: Discharge to home or other facility with appropriate resources  Description: INTERVENTIONS:  - Identify barriers to discharge w/patient and caregiver  - Arrange for needed discharge resources and transportation as appropriate  - Identify discharge learning needs (meds, wound care, etc.)  - Arrange for interpretive services to assist at discharge as needed  - Refer to Case Management Department for coordinating discharge planning if the patient needs post-hospital services based on physician/advanced practitioner order or complex needs related to functional status, cognitive ability, or social support system  Outcome: Progressing     Problem: Knowledge Deficit  Goal: Patient/family/caregiver demonstrates understanding of disease process, treatment plan, medications, and discharge instructions  Description: Complete learning assessment and assess knowledge base.  Interventions:  - Provide teaching at level of understanding  - Provide teaching via preferred learning methods  Outcome: Progressing

## 2024-02-14 NOTE — ASSESSMENT & PLAN NOTE
EKG from earlier this morning at 9 am showed sinus rhythm with 2:1 A-V conduction, heart rate 36, this lasted for about 40 minutes.  Pt was symptomatic with chest tightness  Monitor on telemetry  Consult with EP for possible pacemaker implantation.

## 2024-02-14 NOTE — H&P
[facility]  H&P  Name: Markie Robert 58 y.o. male I MRN: [id number]  Unit/Bed#:  I Date of Admission: (Not on file)   Date of Service: 2/14/2024 I Hospital Day: 0      Assessment/Plan   * Paroxysmal atrial tachycardia  Assessment & Plan  Telemetry at [place] showed atrial tachycardia with frequent sinus pauses.  PT was symptomatic during the events  Also with sinus pauses as mentioned below  Continue metoprolol  Monitor on telemetry  Consult with EP for evaluation and possible ablation.    Sinus pause  Assessment & Plan  EKG from earlier this morning at 9 am showed sinus rhythm with 2:1 A-V conduction, heart rate 36, this lasted for about 40 minutes.  Pt was symptomatic with chest tightness  Monitor on telemetry  Consult with EP for possible pacemaker implantation.    Hypertension  Assessment & Plan  Norvasc and valsartan on hold  Restart when clinically stable    HLD (hyperlipidemia)  Assessment & Plan  Continue statin         VTE Pharmacologic Prophylaxis:   Moderate Risk (Score 3-4) - Pharmacological DVT Prophylaxis Ordered: heparin.  Code Status: Level 1 - Full Code       Anticipated Length of Stay: Patient will be admitted on an inpatient basis with an anticipated length of stay of greater than 2 midnights secondary to possible pacer/ablation.    Total Time Spent on Date of Encounter in care of patient: 45 mins. This time was spent on one or more of the following: performing physical exam; counseling and coordination of care; obtaining or reviewing history; documenting in the medical record; reviewing/ordering tests, medications or procedures; communicating with other healthcare professionals and discussing with patient's family/caregivers.    Chief Complaint: dizziness/lightheadedness cp     History of Present Illness:  Markie Robert is a 58 y.o. male with a PMH of HTN, HLD who presents with with the above symptoms. He was admitted to [facility] for evaluation. During  his hospital course he was found to have atrial tachycardia and intermittent sinus pause. He was symptomatic during these events. Due to a lack of EP services he was referred to [facility] for further evaluation and management..    Review of Systems:  Review of Systems   All other systems reviewed and are negative.      Past Medical and Surgical History:   Past Medical History:   Diagnosis Date    Hyperlipidemia     Hypertension        Past Surgical History:   Procedure Laterality Date    HEMORRHOID SURGERY      HERNIA REPAIR      AK COLONOSCOPY FLX DX W/COLLJ SPEC WHEN PFRMD N/A 10/2/2018    Procedure: COLONOSCOPY;  Surgeon: Mo St MD;  Location: [facility] GI LAB;  Service: Gastroenterology    AK CYSTO/URETERO W/LITHOTRIPSY &INDWELL STENT INSRT Right 9/26/2023    Procedure: CYSTOSCOPY URETEROSCOPY WITH LITHOTRIPSY HOLMIUM LASER, AND INSERTION STENT URETERAL;  Surgeon: Shad Kenyon MD;  Location: [facility] OR;  Service: Urology       Meds/Allergies:  Prior to Admission medications    Medication Sig Start Date End Date Taking? Authorizing Provider   amLODIPine (NORVASC) 5 mg tablet Take 1 tablet (5 mg total) by mouth daily 7/14/23   Xenia Aly DO   HYDROcodone-acetaminophen (NORCO) 5-325 mg per tablet Take 1-2 tablets by mouth every 6 (six) hours as needed for pain for up to 5 doses Max Daily Amount: 8 tablets 9/26/23   Shad Kenyon MD   meloxicam (MOBIC) 15 mg tablet Take 1 tablet (15 mg total) by mouth daily 11/8/23 12/8/23  Landen Persaud DPM   phenazopyridine (PYRIDIUM) 200 mg tablet Take 1 tablet (200 mg total) by mouth 3 (three) times a day as needed for bladder spasms 9/26/23   Shad Kenyon MD   simvastatin (ZOCOR) 20 mg tablet take one tablet (20mg total) by mouth daily at bedtime. 3/22/23   Xenia Aly DO   tamsulosin (FLOMAX) 0.4 mg Take 1 capsule (0.4 mg total) by mouth daily with dinner 9/22/23   Zoraida Nunn DO   valsartan (DIOVAN) 80 mg tablet TAKE ONE TABLET BY MOUTH EVERY DAY 2/10/24    Zoraida Nunn,      I have reviewed home medications using recent Epic encounter.    Allergies: No Known Allergies    Social History:  Marital Status: /Civil Union   Occupation: [employer]  Patient Pre-hospital Living Situation: Home  Patient Pre-hospital Level of Mobility: walks  Patient Pre-hospital Diet Restrictions: None  Substance Use History:   Social History     Substance and Sexual Activity   Alcohol Use Yes    Comment: 1 or 2 drinks once or twice a month     Social History     Tobacco Use   Smoking Status Never   Smokeless Tobacco Never     Social History     Substance and Sexual Activity   Drug Use No       Family History:  Family History   Problem Relation Age of Onset    Hypertension Mother     Hyperlipidemia Mother     Hyperlipidemia Father     No Known Problems Brother        Physical Exam:     Vitals:        Physical Exam  Constitutional:       Appearance: Normal appearance.   HENT:      Head: Normocephalic and atraumatic.      Nose: Nose normal.   Cardiovascular:      Rate and Rhythm: Normal rate and regular rhythm.   Pulmonary:      Effort: Pulmonary effort is normal.   Musculoskeletal:      Right lower leg: No edema.      Left lower leg: No edema.      Comments: Right elbow lipoma   Skin:     General: Skin is warm and dry.   Neurological:      Mental Status: He is alert and oriented to person, place, and time.          Additional Data:     Lab Results:  Results from last 7 days   Lab Units 02/13/24  0046   WBC Thousand/uL 7.19   HEMOGLOBIN g/dL 15.9   HEMATOCRIT % 48.0   PLATELETS Thousands/uL 248   NEUTROS PCT % 65   LYMPHS PCT % 22   MONOS PCT % 12   EOS PCT % 1     Results from last 7 days   Lab Units 02/14/24  0516 02/13/24  1010 02/13/24  0046   SODIUM mmol/L 140   < > 139   POTASSIUM mmol/L 3.8   < > 3.9   CHLORIDE mmol/L 109*   < > 106   CO2 mmol/L 26   < > 23   BUN mg/dL 18   < > 28*   CREATININE mg/dL 1.00   < > 1.30   ANION GAP mmol/L 5   < > 10   CALCIUM mg/dL 9.2   < > 9.8    ALBUMIN g/dL  --   --  4.5   TOTAL BILIRUBIN mg/dL  --   --  0.51   ALK PHOS U/L  --   --  52   ALT U/L  --   --  24   AST U/L  --   --  21   GLUCOSE RANDOM mg/dL 96   < > 89    < > = values in this interval not displayed.                       Lines/Drains:  Invasive Devices       Peripheral Intravenous Line  Duration             Peripheral IV 02/13/24 Distal;Dorsal (posterior);Left Forearm 1 day    Peripheral IV 02/13/24 Left Antecubital 1 day              Drain  Duration             Ureteral Internal Stent Right ureter 6 Fr. 141 days                        Imaging: Reviewed radiology reports from this admission including: chest CT scan and ECHO  No orders to display       EKG and Other Studies Reviewed on Admission:   EKG:  SR 2:1 AV conduction.    ** Please Note: This note has been constructed using a voice recognition system. **

## 2024-02-14 NOTE — DISCHARGE SUMMARY
[facility]  Discharge- Markie Robert 1965, 58 y.o. male MRN: [id number]  Unit/Bed#: [place] [address] -01 Encounter: [id number]  Primary Care Provider: Xenia Aly DO   Date and time admitted to hospital: 2/13/2024 12:32 AM    * Paroxysmal atrial tachycardia  Assessment & Plan  Patient presented to the hospital, concern for SVT on initial EKG  Monitored on telemetry, now appears to have atrial tachycardia with frequent pauses  Started on metoprolol 25 mg every 6 hours, developed bradycardia with sustained heart rate in the 30s  Cardiology following  Plan for transfer to [place] for electrophysiology evaluation  Echocardiogram completed yesterday with EF 56%    Hypertension  Assessment & Plan  Amlodipine and valsartan on hold  Currently on metoprolol per cardiology  BP is remained well-controlled    HLD (hyperlipidemia)  Assessment & Plan  Continue statin      Medical Problems       Resolved Problems  Date Reviewed: 2/14/2024   None       Discharging Physician / Practitioner: Giulia Cates PA-C  PCP: Xenia Aly DO  Admission Date:   Admission Orders (From admission, onward)       Ordered        02/13/24 0332  Place in Observation  Once                          Discharge Date: 02/14/24    Consultations During Hospital Stay:  Cardiology    Procedures Performed:   CTA ED chest PE study    Result Date: 2/13/2024  Impression: 1.  No acute pulmonary embolism. 2.  Small lung nodules, stable from September 2023. Based on current Fleischner Society 2017 Guidelines on incidental pulmonary nodule, no routine follow-up is needed if the patient is low risk. If the patient is high risk, optional follow-up chest CT at  12 months can be considered. 3.  Bilateral nonobstructing renal calculi       Significant Findings / Test Results:   See above    Incidental Findings:   Small lung nodules      Test Results Pending at Discharge (will require follow up):   None     Outpatient Tests  Requested:  To be determined    Complications: None    Reason for Admission: Paroxysmal atrial tachycardia    Hospital Course:   Markie Robert is a 58 y.o. male patient who originally presented to the hospital on 2/13/2024 due to palpitations.  He was found to be in SVT versus atrial tachycardia.  He was started on metoprolol and monitored on telemetry.  He continued to have uncontrolled heart rate with development of intermittent pauses on telemetry.  He did continue to have symptoms of palpitations and chest pressure.  He was seen in consult by cardiology and discussion was had with electrophysiology for transfer to [place] for ablation versus pacemaker discussion.      Please see above list of diagnoses and related plan for additional information.     Condition at Discharge: stable    Discharge Day Visit / Exam:   * Please refer to separate progress note for these details *      Discharge instructions/Information to patient and family:   See after visit summary for information provided to patient and family.      Provisions for Follow-Up Care:  See after visit summary for information related to follow-up care and any pertinent home health orders.      Mobility at time of Discharge:   Basic Mobility Inpatient Raw Score: 24  -[facility] Goal: 8: Walk 250 feet or more  -HLM Achieved: 1: Laying in bed  HLM Goal NOT achieved. Continue to encourage mobility in post discharge setting.     Disposition:   Acute Care Hospital Transfer to [facility]    Planned Readmission: [facility]     Discharge Statement:  I spent 35 minutes discharging the patient. This time was spent on the day of discharge. I had direct contact with the patient on the day of discharge. Greater than 50% of the total time was spent examining patient, answering all patient questions, arranging and discussing plan of care with patient as well as directly providing post-discharge instructions.  Additional time then spent on discharge  activities.    Discharge Medications:  See after visit summary for reconciled discharge medications provided to patient and/or family.      **Please Note: This note may have been constructed using a voice recognition system**

## 2024-02-14 NOTE — ASSESSMENT & PLAN NOTE
Telemetry at [place] showed atrial tachycardia with frequent sinus pauses.  PT was symptomatic during the events  Also with sinus pauses as mentioned below  Continue metoprolol  Monitor on telemetry  Consult with EP for evaluation and possible ablation.

## 2024-02-14 NOTE — ASSESSMENT & PLAN NOTE
Patient presented to the hospital, concern for SVT on initial EKG  Monitored on telemetry, now appears to have atrial tachycardia with frequent pauses  Started on metoprolol 25 mg every 6 hours, developed bradycardia with sustained heart rate in the 30s  Cardiology following  Plan for transfer to [place] for electrophysiology evaluation  Echocardiogram completed yesterday with EF 56%

## 2024-02-14 NOTE — PROGRESS NOTES
"    Cardiology - Progress Note  Markie Robert 58 y.o. male MRN: [id number]  Unit/Bed#: [place] [address] -01 Encounter: [id number]  02/14/24  10:52 AM    Encounter date: 2/14/2024  Patient name: Markie Robert 58 y.o. male  Medical Student: Rod Gusman  Attending Physician: Dr. Isaias Barth DO  Primary care provider: Xenia Aly DO  Date of admission: 2/13/2024  Length of stay: 1 day      Assessment  Paroxysmal atrial tachycardia  Blocked PACs  Precordial chest pain  Hypertension  Dyslipidemia    Plan  - Telemetry shows the patient is in atrial tachycardia with frequent sinus pauses  - EKG from earlier this morning at 9 am showed sinus rhythm with 2:1 A-V conduction, heart rate 36, this lasted for about 40 minutes or so.  He was symptomatic with chest tightness with this  -When tachycardic, he feels like his heart is beating very erratically  - TTE (2/14/24) showed EF of 56%, mild mitral regurgitation, and mild dilation of the ascending aorta  - TSH is normal  - No cardiac marker elevation, making ischemia unlikely  - Urine toxicology is negative  - Continue Lopressor for tachyarrhythmia  - Continue pravastatin  - Spoke to [facility] [place] EP, patient will be transferred for possible ablation of atrial tachycardia and/or pacemaker placement.       HPI: Today, Markie Robert is still reporting similar symptoms as yesterday. He complains of lightheadedness, palpitations, chest pain that he describes as tightness/pressure. He denies episodes of headache, syncope, shortness of breath, nausea, vomiting, or swelling in his extremities.     Objective   Vitals: Blood pressure 104/69, pulse 86, temperature 97.9 °F (36.6 °C), resp. rate 19, height 5' 9\" (1.753 m), weight 73.9 kg (163 lb), SpO2 96%., Body mass index is 24.07 kg/m².,   Orthostatic Blood Pressures      Flowsheet Row Most Recent Value   Blood Pressure 104/69 filed at 02/14/2024 0922   Patient Position - Orthostatic VS Lying filed at 02/13/2024 2341      "       Systolic (24hrs), Av , Min:104 , Max:118     Diastolic (24hrs), Av, Min:69, Max:89      No intake or output data in the 24 hours ending 24 1052    Invasive Devices       Peripheral Intravenous Line  Duration             Peripheral IV 24 Distal;Dorsal (posterior);Left Forearm 1 day    Peripheral IV 24 Left Antecubital 1 day              Drain  Duration             Ureteral Internal Stent Right ureter 6 Fr. 140 days                      Physical Exam:  Physical Exam  Constitutional:       Appearance: Normal appearance. He is normal weight.   HENT:      Head: Normocephalic and atraumatic.   Cardiovascular:      Rate and Rhythm: Tachycardia present. Rhythm irregular.      Heart sounds: No murmur heard.  Pulmonary:      Effort: No respiratory distress.      Breath sounds: No wheezing or rales.   Musculoskeletal:         General: No swelling or tenderness.      Right lower leg: No edema.      Left lower leg: No edema.   Skin:     Coloration: Skin is not jaundiced or pale.   Neurological:      General: No focal deficit present.      Mental Status: He is alert and oriented to person, place, and time. Mental status is at baseline.       Constitutional: awake, alert and oriented, in no acute distress, no obvious deformities  Head: Normocephalic, without obvious abnormality, atraumatic  Eyes: conjunctivae clear and moist. Sclera anicteric.  No xanthelasmas. Pupils equal bilaterally.  Extraocular motions are full.  Ear nose mouth and throat: ears are symmetrical bilaterally, hearing appears to be equal bilaterally, no nasal discharge or epistaxis, oropharynx is clear with moist mucous membranes  Neck:  Trachea is midline, neck is supple, no thyromegaly or significant lymphadenopathy, there is full range of motion.  Lungs: clear to auscultation bilaterally, no wheezes, no rales, no rhonchi, no accessory muscle use, breathing is nonlabored  Heart: Irregular rhythm with a Normal heart rate, S1, S2  normal, No Murmur, no click, rub or gallop, No lower extremity edema    Abdomen: soft, non-tender; bowel sounds normal; no masses,  no organomegaly  Psychiatric:  Patient is oriented to time, place, person, mood/affect is negative for depression, anxiety, agitation, appears to have appropriate insight  Skin: Skin is warm, dry, intact. No obvious rashes or lesions on exposed extremities.  Nail beds are pink with no cyanosis or clubbing.      EKG:   Date: 02/14/2024 09:17  Interpretation:  - Sinus rhythm with 2:1 A-V conduction    ECHO:   Date: 02/13/2024 09:00  Interpretation:   - Left Ventricle: Left ventricular cavity size is normal. Wall thickness is normal. The left ventricular ejection fraction is 56% by biplane measurement. Systolic function is normal. Wall motion is normal.  - Aortic Valve: The aortic valve is trileaflet.  There is aortic sclerosis.  - Mitral Valve: There is mild regurgitation.  - Tricuspid Valve: There is mild regurgitation. The right ventricular systolic pressure is normal. The estimated right ventricular systolic pressure is 13.00 mmHg.  - Aorta: The aortic root is normal in size. The ascending aorta is mildly dilated. The aortic root is 3.40 cm. The ascending aorta is 4 cm.    Telemetry:   Patient is in SVT with frequent sinus pauses  Lab Results:       CBC with diff:   Results from last 7 days   Lab Units 02/13/24  0046   WBC Thousand/uL 7.19   HEMOGLOBIN g/dL 15.9   HEMATOCRIT % 48.0   MCV fL 93   PLATELETS Thousands/uL 248   RBC Million/uL 5.17   MCH pg 30.8   MCHC g/dL 33.1   RDW % 13.6   MPV fL 10.3   NRBC AUTO /100 WBCs 0       CMP:   Results from last 7 days   Lab Units 02/14/24  0516 02/13/24  1010 02/13/24  0046   POTASSIUM mmol/L 3.8 4.0 3.9   CHLORIDE mmol/L 109* 109* 106   CO2 mmol/L 26 25 23   BUN mg/dL 18 21 28*   CREATININE mg/dL 1.00 1.04 1.30   CALCIUM mg/dL 9.2 9.5 9.8   AST U/L  --   --  21   ALT U/L  --   --  24   ALK PHOS U/L  --   --  52   EGFR ml/min/1.73sq m 82 78  60       Rod Gusman  MS3     Isaias Barth DO, FACC, FASNC  Cardiovascular diseases

## 2024-02-14 NOTE — TRANSPORTATION MEDICAL NECESSITY
"Section I - General Information    Name of Patient: Markie Robert                 : 1965    [facility] #: [id number]  Transport Date:                                (PCS is valid for round trips on this date and for all repetitive trips in the 60-day range as noted below.)  Origin: [place] [address]                                                         Destination: [facility]  Is the pt's stay covered under Medicare Part A (PPS/DRG)   []     Closest appropriate facility? If no, why is transport to more distant facility required? Yes  If hospice pt, is this transport related to pt's terminal illness? NA       Section II - Medical Necessity Questionnaire  Ambulance transportation is medically necessary only if other means of transport are contraindicated or would be potentially harmful to the patient. To meet this requirement, the patient must either be \"bed confined\" or suffer from a condition such that transport by means other than ambulance is contraindicated by the patient's condition. The following questions must be answered by the medical professional signing below for this form to be valid:    1)  Describe the MEDICAL CONDITION (physical and/or mental) of this patient AT THE TIME OF AMBULANCE TRANSPORT that requires the patient to be transported in an ambulance and why transport by other means is contraindicated by the patient's condition:transfer for higher level of care- in need of electrophysiologic cardiology for atrial ablation +/- PPM placment    2) Is the patient \"bed confined\" as defined below?     No  To be \"be confined\" the patient must satisfy all three of the following conditions: (1) unable to get up from bed without Assistance; AND (2) unable to ambulate; AND (3) unable to sit in a chair or wheelchair.    3) Can this patient safely be transported by car or wheelchair van (i.e., seated during transport without a medical attendant or monitoring)?   " No    4) In addition to completing questions 1-3 above, please check any of the following conditions that apply*:   *Note: supporting documentation for any boxes checked must be maintained in the patient's medical records.  If hosp-hosp transfer, describe services needed at 2nd facility not available at 1st facility?   Medical attendant required   Hemodynamic monitoring required en route  Cardiac monitoring required en route   Other(specify) Higher level of care-remains acute care patient      Section III - Signature of Physician or Healthcare Professional  I certify that the above information is true and correct based on my evaluation of this patient, and represent that the patient requires transport by ambulance and that other forms of transport are contraindicated. I understand that this information will be used by the Centers for Medicare and Medicaid Services (CMS) to support the determination of medical necessity for ambulance services, and I represent that I have personal knowledge of the patient's condition at time of transport.    [x]  If this box is checked, I also certify that the patient is physically or mentally incapable of signing the ambulance service's claim and that the institution with which I am affiliated has furnished care, services, or assistance to the patient.    My signature below is made on behalf of the patient pursuant to 42 CFR §424.36(b)(4). In accordance with 42 CFR §424.37, the specific reason(s) that the patient is physically or mentally incapable of signing the claim form is as follows: .      Signature of Physician* or Healthcare Professional______________________________________________________________  Signature Date 02/14/24 (For scheduled repetitive transports, this form is not valid for transports performed more than 60 days after this date)    Printed Name & Credentials of Physician or Healthcare Professional (MD, , RN, etc.)__Lilia Gillespie RN  *Form must be signed  by patient's attending physician for scheduled, repetitive transports. For non-repetitive, unscheduled ambulance transports, if unable to obtain the signature of the attending physician, any of the following may sign (choose appropriate option below)  [] Physician Assistant []  Clinical Nurse Specialist [x]  Registered Nurse  []  Nurse Practitioner  [x] Discharge Planner

## 2024-02-14 NOTE — ASSESSMENT & PLAN NOTE
Amlodipine and valsartan on hold  Currently on metoprolol per cardiology  BP is remained well-controlled

## 2024-02-15 ENCOUNTER — ANESTHESIA EVENT (INPATIENT)
Dept: NON INVASIVE DIAGNOSTICS | Facility: HOSPITAL | Age: 59
DRG: 274 | End: 2024-02-15
Payer: COMMERCIAL

## 2024-02-15 LAB
ANION GAP SERPL CALCULATED.3IONS-SCNC: 8 MMOL/L
ATRIAL RATE: 76 BPM
BASOPHILS # BLD AUTO: 0.02 THOUSANDS/ÂΜL (ref 0–0.1)
BASOPHILS NFR BLD AUTO: 0 % (ref 0–1)
BUN SERPL-MCNC: 20 MG/DL (ref 5–25)
CALCIUM SERPL-MCNC: 9.2 MG/DL (ref 8.4–10.2)
CARDIAC TROPONIN I PNL SERPL HS: 8 NG/L (ref 8–18)
CHLORIDE SERPL-SCNC: 106 MMOL/L (ref 96–108)
CO2 SERPL-SCNC: 25 MMOL/L (ref 21–32)
CREAT SERPL-MCNC: 0.98 MG/DL (ref 0.6–1.3)
EOSINOPHIL # BLD AUTO: 0.1 THOUSAND/ÂΜL (ref 0–0.61)
EOSINOPHIL NFR BLD AUTO: 2 % (ref 0–6)
ERYTHROCYTE [DISTWIDTH] IN BLOOD BY AUTOMATED COUNT: 13.7 % (ref 11.6–15.1)
GFR SERPL CREATININE-BSD FRML MDRD: 84 ML/MIN/1.73SQ M
GLUCOSE SERPL-MCNC: 104 MG/DL (ref 65–140)
GLUCOSE SERPL-MCNC: 112 MG/DL (ref 65–140)
GLUCOSE SERPL-MCNC: 87 MG/DL (ref 65–140)
HCT VFR BLD AUTO: 47.9 % (ref 36.5–49.3)
HGB BLD-MCNC: 15.9 G/DL (ref 12–17)
IMM GRANULOCYTES # BLD AUTO: 0.02 THOUSAND/UL (ref 0–0.2)
IMM GRANULOCYTES NFR BLD AUTO: 0 % (ref 0–2)
LYMPHOCYTES # BLD AUTO: 1.01 THOUSANDS/ÂΜL (ref 0.6–4.47)
LYMPHOCYTES NFR BLD AUTO: 18 % (ref 14–44)
MAGNESIUM SERPL-MCNC: 2.1 MG/DL (ref 1.9–2.7)
MCH RBC QN AUTO: 30.6 PG (ref 26.8–34.3)
MCHC RBC AUTO-ENTMCNC: 33.2 G/DL (ref 31.4–37.4)
MCV RBC AUTO: 92 FL (ref 82–98)
MONOCYTES # BLD AUTO: 0.49 THOUSAND/ÂΜL (ref 0.17–1.22)
MONOCYTES NFR BLD AUTO: 9 % (ref 4–12)
NEUTROPHILS # BLD AUTO: 3.9 THOUSANDS/ÂΜL (ref 1.85–7.62)
NEUTS SEG NFR BLD AUTO: 71 % (ref 43–75)
NRBC BLD AUTO-RTO: 0 /100 WBCS
P AXIS: 35 DEGREES
PLATELET # BLD AUTO: 245 THOUSANDS/UL (ref 149–390)
PMV BLD AUTO: 10.8 FL (ref 8.9–12.7)
POTASSIUM SERPL-SCNC: 4 MMOL/L (ref 3.5–5.3)
PR INTERVAL: 152 MS
QRS AXIS: 38 DEGREES
QRSD INTERVAL: 90 MS
QT INTERVAL: 376 MS
QTC INTERVAL: 423 MS
RBC # BLD AUTO: 5.19 MILLION/UL (ref 3.88–5.62)
SODIUM SERPL-SCNC: 139 MMOL/L (ref 135–147)
T WAVE AXIS: 39 DEGREES
VENTRICULAR RATE: 76 BPM
WBC # BLD AUTO: 5.54 THOUSAND/UL (ref 4.31–10.16)

## 2024-02-15 PROCEDURE — 99223 1ST HOSP IP/OBS HIGH 75: CPT | Performed by: INTERNAL MEDICINE

## 2024-02-15 PROCEDURE — 99231 SBSQ HOSP IP/OBS SF/LOW 25: CPT | Performed by: PHYSICIAN ASSISTANT

## 2024-02-15 PROCEDURE — 85025 COMPLETE CBC W/AUTO DIFF WBC: CPT

## 2024-02-15 PROCEDURE — 82948 REAGENT STRIP/BLOOD GLUCOSE: CPT

## 2024-02-15 PROCEDURE — 84484 ASSAY OF TROPONIN QUANT: CPT

## 2024-02-15 PROCEDURE — 80048 BASIC METABOLIC PNL TOTAL CA: CPT

## 2024-02-15 PROCEDURE — 93005 ELECTROCARDIOGRAM TRACING: CPT

## 2024-02-15 PROCEDURE — 83735 ASSAY OF MAGNESIUM: CPT

## 2024-02-15 RX ORDER — CEFAZOLIN SODIUM 1 G/50ML
1000 SOLUTION INTRAVENOUS ONCE
Status: DISCONTINUED | OUTPATIENT
Start: 2024-02-15 | End: 2024-02-16

## 2024-02-15 RX ORDER — VANCOMYCIN HYDROCHLORIDE 1 G/200ML
1000 INJECTION, SOLUTION INTRAVENOUS ONCE
Status: DISCONTINUED | OUTPATIENT
Start: 2024-02-15 | End: 2024-02-15

## 2024-02-15 RX ADMIN — HEPARIN SODIUM 5000 UNITS: 5000 INJECTION INTRAVENOUS; SUBCUTANEOUS at 21:09

## 2024-02-15 RX ADMIN — METOPROLOL TARTRATE 25 MG: 25 TABLET, FILM COATED ORAL at 18:06

## 2024-02-15 RX ADMIN — HEPARIN SODIUM 5000 UNITS: 5000 INJECTION INTRAVENOUS; SUBCUTANEOUS at 14:02

## 2024-02-15 RX ADMIN — METOPROLOL TARTRATE 25 MG: 25 TABLET, FILM COATED ORAL at 12:30

## 2024-02-15 RX ADMIN — HEPARIN SODIUM 5000 UNITS: 5000 INJECTION INTRAVENOUS; SUBCUTANEOUS at 06:37

## 2024-02-15 RX ADMIN — PRAVASTATIN SODIUM 40 MG: 20 TABLET ORAL at 18:06

## 2024-02-15 NOTE — PLAN OF CARE
Problem: PAIN - ADULT  Goal: Verbalizes/displays adequate comfort level or baseline comfort level  Description: Interventions:  - Encourage patient to monitor pain and request assistance  - Assess pain using appropriate pain scale  - Administer analgesics based on type and severity of pain and evaluate response  - Implement non-pharmacological measures as appropriate and evaluate response  - Consider cultural and social influences on pain and pain management  - Notify physician/advanced practitioner if interventions unsuccessful or patient reports new pain  Outcome: Progressing     Problem: INFECTION - ADULT  Goal: Absence or prevention of progression during hospitalization  Description: INTERVENTIONS:  - Assess and monitor for signs and symptoms of infection  - Monitor lab/diagnostic results  - Monitor all insertion sites, i.e. indwelling lines, tubes, and drains  - Monitor endotracheal if appropriate and nasal secretions for changes in amount and color  - [place] appropriate cooling/warming therapies per order  - Administer medications as ordered  - Instruct and encourage patient and family to use good hand hygiene technique  - Identify and instruct in appropriate isolation precautions for identified infection/condition  Outcome: Progressing  Goal: Absence of fever/infection during neutropenic period  Description: INTERVENTIONS:  - Monitor WBC    Outcome: Progressing     Problem: SAFETY ADULT  Goal: Patient will remain free of falls  Description: INTERVENTIONS:  - Educate patient/family on patient safety including physical limitations  - Instruct patient to call for assistance with activity   - Consult OT/PT to assist with strengthening/mobility   - Keep Call bell within reach  - Keep bed low and locked with side rails adjusted as appropriate  - Keep care items and personal belongings within reach  - Initiate and maintain comfort rounds  - Make Fall Risk Sign visible to staff  - Apply yellow socks and bracelet  for high fall risk patients  - Consider moving patient to room near nurses station  Outcome: Progressing  Goal: Maintain or return to baseline ADL function  Description: INTERVENTIONS:  -  Assess patient's ability to carry out ADLs; assess patient's baseline for ADL function and identify physical deficits which impact ability to perform ADLs (bathing, care of mouth/teeth, toileting, grooming, dressing, etc.)  - Assess/evaluate cause of self-care deficits   - Assess range of motion  - Assess patient's mobility; develop plan if impaired  - Assess patient's need for assistive devices and provide as appropriate  - Encourage maximum independence but intervene and supervise when necessary  - Involve family in performance of ADLs  - Assess for home care needs following discharge   - Consider OT consult to assist with ADL evaluation and planning for discharge  - Provide patient education as appropriate  Outcome: Progressing  Goal: Maintains/Returns to pre admission functional level  Description: INTERVENTIONS:  - Perform AM-PAC 6 Click Basic Mobility/ Daily Activity assessment daily.  - Set and communicate daily mobility goal to care team and patient/family/caregiver.   - Collaborate with rehabilitation services on mobility goals if consulted  - Out of bed for toileting  - Record patient progress and toleration of activity level   Outcome: Progressing     Problem: DISCHARGE PLANNING  Goal: Discharge to home or other facility with appropriate resources  Description: INTERVENTIONS:  - Identify barriers to discharge w/patient and caregiver  - Arrange for needed discharge resources and transportation as appropriate  - Identify discharge learning needs (meds, wound care, etc.)  - Arrange for interpretive services to assist at discharge as needed  - Refer to Case Management Department for coordinating discharge planning if the patient needs post-hospital services based on physician/advanced practitioner order or complex needs  related to functional status, cognitive ability, or social support system  Outcome: Progressing     Problem: Knowledge Deficit  Goal: Patient/family/caregiver demonstrates understanding of disease process, treatment plan, medications, and discharge instructions  Description: Complete learning assessment and assess knowledge base.  Interventions:  - Provide teaching at level of understanding  - Provide teaching via preferred learning methods  Outcome: Progressing

## 2024-02-15 NOTE — ASSESSMENT & PLAN NOTE
Pt initially presented to [facility] for initial concern for SVT on EKG, however on telemetry monitoring noted to have atrial tachycardia with frequent pauses and bradycardia   Transferred to B for EP evaluation  EP following  Awaiting further recommendations  Continue telemetry monitoring   Defer metoprolol dosing to EP team

## 2024-02-15 NOTE — CONSULTS
Reason for Consult / Principal Problem:AT    Physician Requesting Consult:  Mary Ibrahim MD    Cardiologist: none      Assessment and Plan      Current Problem List   Principal Problem:    Paroxysmal atrial tachycardia  Active Problems:    HLD (hyperlipidemia)    Hypertension    Sinus pause    Assessment/Plan:    This is a 58-year-old male with below past medical history is a transfer from [facility] due to paroxysmal atrial tachycardia.  Patient initially presented with dizziness, palpitations, chest pain and lightheadedness.    # Paroxysmal symptomatic atrial tachycardia with rates over 170 bpm  # Conversion pauses from Atach to sinus rhythm- pauses of less than 2 seconds on EKGs  # Normal sinus rhythm with blocked PACs on EKGs  # Sinus bradycardia on EKGs and telemetry  # History of hypertension and hyperlipidemia  # Rare alcohol use-once per month      TTE: Ef 56%, no significant valvular disease.  TSH normal.  Rapid urine drug screen normal.      Tele: Episodes of paroxysmal atrial tach, normal sinus rhythm.      Plan:    Will plan for SVT EP study and ablation for atrial tachycardia tomorrow on 2/16.  N.p.o. after midnight.  Ideally would like the metoprolol to be washed out by tomorrow.  Will give 2 additional dose of Lopressor 25 mg and then stop it.  Consider short acting agents like IV esmolol if he goes back into fast atrial tachycardia or SVT.  Rest of the care per primary team.              Subjective     CC: AT    HPI: Markie Robert 58 y.o. year old male who presents with symptoms of dizziness, palpitations, chest pain and lightheadedness.  Patient initially presented at [facility] with his symptoms.  Patient was in normal state of health until few days ago when he started having episodes of lightheadedness as well as palpitations.  Initially his symptoms were self-limiting and less intense although on the day of admission at [facility],  progressively worsening symptoms with increased intensity and frequency.  He even had an episode where he was about to fall although he did not.  Admits to intermittent alcohol use although not daily.  Denies any recent infections or URI symptoms.  Patient initially presented at [facility] where cardiology was consulted.  Started on metoprolol every 6 hours for rate control.  Case was discussed with EP and he was eventually transferred to [facility] for ablation plus or minus pacemaker indication.      Family History: non-contributory  Historical Information   Past Medical History:   Diagnosis Date    Hyperlipidemia     Hypertension      Past Surgical History:   Procedure Laterality Date    HEMORRHOID SURGERY      HERNIA REPAIR      NY COLONOSCOPY FLX DX W/COLLJ SPEC WHEN PFRMD N/A 10/2/2018    Procedure: COLONOSCOPY;  Surgeon: Mo St MD;  Location: [facility] GI LAB;  Service: Gastroenterology    NY CYSTO/URETERO W/LITHOTRIPSY &INDWELL STENT INSRT Right 9/26/2023    Procedure: CYSTOSCOPY URETEROSCOPY WITH LITHOTRIPSY HOLMIUM LASER, AND INSERTION STENT URETERAL;  Surgeon: Shad Kenyon MD;  Location: [facility] OR;  Service: Urology     Social History   Social History     Substance and Sexual Activity   Alcohol Use Yes    Comment: 1 or 2 drinks once or twice a month     Social History     Substance and Sexual Activity   Drug Use No     Social History     Tobacco Use   Smoking Status Never   Smokeless Tobacco Never     Family History:   Family History   Problem Relation Age of Onset    Hypertension Mother     Hyperlipidemia Mother     Hyperlipidemia Father     No Known Problems Brother        Review of Systems:  Review of Systems   Constitutional:  Negative for activity change, appetite change, chills and diaphoresis.   Respiratory:  Positive for chest tightness. Negative for apnea, cough and shortness of breath.    Cardiovascular:  Positive for palpitations. Negative for chest pain and leg swelling.   Gastrointestinal:   "Negative for abdominal distention, abdominal pain, constipation and diarrhea.           Scheduled Meds:  Current Facility-Administered Medications   Medication Dose Route Frequency Provider Last Rate    acetaminophen  650 mg Oral Q6H PRN Polo Sanders MD      aluminum-magnesium hydroxide-simethicone  30 mL Oral Q4H PRN Polo Sanders MD      cefazolin  1,000 mg Intravenous Once Billie Ledezma PA-C      heparin (porcine)  5,000 Units Subcutaneous Q8H [facility] Polo Sanders MD      metoprolol tartrate  25 mg Oral Q6H Polo Sanders MD      ondansetron  4 mg Intravenous Q6H PRN Polo Sanders MD      pravastatin  40 mg Oral Daily With Dinner Polo Sanders MD      vancomycin  1,000 mg Intravenous Once Billie Ledezma PA-C       Continuous Infusions:   PRN Meds:.  acetaminophen    aluminum-magnesium hydroxide-simethicone    ondansetron  all current active meds have been reviewed    No Known Allergies    Objective   Vitals: Temp (24hrs), Av.8 °F (36.6 °C), Min:97.6 °F (36.4 °C), Max:98 °F (36.7 °C)  Current: Temperature: 97.8 °F (36.6 °C)  Patient Vitals for the past 24 hrs:   BP Temp Temp src Pulse Resp SpO2 Height Weight   02/15/24 0714 122/91 97.8 °F (36.6 °C) Oral -- 18 -- -- --   02/15/24 0635 126/94 -- -- -- -- -- -- --   02/15/24 0127 129/96 -- -- 101 -- 95 % -- --   24 2256 95/61 97.6 °F (36.4 °C) -- (!) 38 -- 96 % -- --   24 -- 97.9 °F (36.6 °C) -- 101 -- 95 % -- --   24 (!) 152/108 -- -- -- -- -- -- --   24 (!) 152/108 97.9 °F (36.6 °C) Oral 100 18 -- 5' 9\" (1.753 m) 74.6 kg (164 lb 6.4 oz)   24 -- -- -- -- -- -- -- 74.6 kg (164 lb 6.4 oz)    Body mass index is 24.28 kg/m².  Orthostatic Blood Pressures      Flowsheet Row Most Recent Value   Blood Pressure 122/91 filed at 02/15/2024 0714   Patient Position - Orthostatic VS Lying filed at 02/15/2024 0714                Invasive Devices       Peripheral Intravenous Line  Duration             Peripheral IV " "02/13/24 Distal;Dorsal (posterior);Left Forearm 2 days    Peripheral IV 02/13/24 Left Antecubital 2 days              Drain  Duration             Ureteral Internal Stent Right ureter 6 Fr. 141 days                    Physical Exam:  Physical Exam  Vitals reviewed.   Constitutional:       General: He is not in acute distress.     Appearance: He is well-developed. He is not diaphoretic.   Cardiovascular:      Rate and Rhythm: Normal rate and regular rhythm.      Heart sounds: Normal heart sounds. No murmur heard.     No friction rub.   Pulmonary:      Effort: Pulmonary effort is normal. No respiratory distress.      Breath sounds: Normal breath sounds. No stridor. No wheezing.   Abdominal:      General: Bowel sounds are normal. There is no distension.      Palpations: Abdomen is soft.      Tenderness: There is no abdominal tenderness. There is no guarding.             Lab Results:   Results from last 7 days   Lab Units 02/15/24  0149 02/14/24  0000 02/13/24  0046   WBC Thousand/uL 5.54  --  7.19   HEMOGLOBIN g/dL 15.9  --  15.9   HEMATOCRIT % 47.9  --  48.0   PLATELETS Thousands/uL 245 246 248   NEUTROS PCT % 71  --  65   MONOS PCT % 9  --  12   EOS PCT % 2  --  1      Results from last 7 days   Lab Units 02/15/24  0149 02/14/24  0516 02/13/24  1010 02/13/24  0101 02/13/24  0046   SODIUM mmol/L 139 140 141  --  139   POTASSIUM mmol/L 4.0 3.8 4.0  --  3.9   CHLORIDE mmol/L 106 109* 109*  --  106   CO2 mmol/L 25 26 25  --  23   BUN mg/dL 20 18 21  --  28*   CREATININE mg/dL 0.98 1.00 1.04  --  1.30   CALCIUM mg/dL 9.2 9.2 9.5  --  9.8   ALK PHOS U/L  --   --   --   --  52   ALT U/L  --   --   --   --  24   AST U/L  --   --   --   --  21   MAGNESIUM mg/dL 2.1  --   --  2.3  --    EGFR ml/min/1.73sq m 84 82 78  --  60                 No results found for: \"PHART\", \"MJP9EQF\", \"PO2ART\", \"HES1MVB\", \"L1SSRGUP\", \"BEART\", \"SOURCE\"  No components found for: \"HIV1X2\"  Lab Results   Component Value Date    HEPCAB Non-reactive " "12/09/2021     No results found for: \"SPEP\", \"UPEP\" No results found for: \"HGBA1C\"  No results found for: \"CHOL\"   Lab Results   Component Value Date    HDL 55 07/05/2022    HDL 53 12/09/2021    HDL 48 07/14/2018      Lab Results   Component Value Date    LDLCALC 85 07/05/2022    LDLCALC 205 (H) 12/09/2021    LDLCALC 177 (H) 07/14/2018      Lab Results   Component Value Date    TRIG 107 07/05/2022    TRIG 168 (H) 12/09/2021    TRIG 135 07/14/2018     No components found for: \"PROCAL\"  Results from last 7 days   Lab Units 02/13/24  0101   BNP pg/mL 59         Imaging: I have personally reviewed pertinent reports.                 "

## 2024-02-15 NOTE — UTILIZATION REVIEW
Initial Clinical Review    Admission: Date/Time/Statement:   Admission Orders (From admission, onward)       Ordered        02/14/24 2112  Inpatient Admission  Once                          Orders Placed This Encounter   Procedures    Inpatient Admission     Standing Status:   Standing     Number of Occurrences:   1     Order Specific Question:   Level of Care     Answer:   Med Surg [16]     Order Specific Question:   Estimated length of stay     Answer:   More than 2 Midnights     Order Specific Question:   Certification     Answer:   I certify that inpatient services are medically necessary for this patient for a duration of greater than two midnights. See H&P and MD Progress Notes for additional information about the patient's course of treatment.     ED Arrival Information       Patient not seen in ED                       No chief complaint on file.      Initial Presentation: 58 y.o. male w/ PMH of HTN, HLD was transferred from [facility] to [facility] for EP evaluation and possible ablation.  Pt initially presented to [facility] w/ chest pain, palpitations, dizziness/lightheadedness. concern for SVT on initial EKG. On tele, appeared to have atrial tachycardia with frequent pauses. Started on metoprolol 25 mg every 6 hours, developed bradycardia with sustained heart rate in the 30s.   Transferred to [facility].  On arrival to [facility], pt , /108. Aaox3, R elbow lipoma present on exam.    Admit as Inpatient for evaluation and treatment of atrial tachycardia with frequent sinus pauses.   Plan: Continue metoprolol. Monitor on telemetry. Consult with EP for evaluation and possible ablation.    Date: 02/15   Day 2:   EP Consult: SVT very suggestive of PAT, Conversion pauses up to 2 seconds:  Plan: Will plan for SVT EP study and ablation for atrial tachycardia tomorrow on 2/16.  N.p.o. after midnight. Ideally would like the metoprolol to be washed out by tomorrow.  Will give 2 additional dose of Lopressor 25 mg  and then stop it. Consider short acting agents like IV esmolol if he goes back into fast atrial tachycardia or SVT.    IM Notes: Pt feeling well today, denies CP. Overnight, he was having multiple episodes of chest tightness and heart palpitations. BP overall stable on lopressor 25 mg Q6H alone. Continue to hold amlodipine and valsartan. Cont to mon BP.    ED Triage Vitals   Temperature Pulse Respirations Blood Pressure SpO2   02/14/24 2121 02/14/24 2121 02/14/24 2121 02/14/24 2121 02/14/24 2130   97.9 °F (36.6 °C) 100 18 (!) 152/108 95 %      Temp Source Heart Rate Source Patient Position - Orthostatic VS BP Location FiO2 (%)   02/14/24 2121 -- 02/14/24 2121 02/14/24 2121 --   Oral  Sitting Right arm       Pain Score       02/14/24 2121       No Pain          Wt Readings from Last 1 Encounters:   02/14/24 74.6 kg (164 lb 6.4 oz)     Additional Vital Signs:     Date/Time Temp Pulse Resp BP MAP (mmHg) SpO2 O2 Device Patient Position - Orthostatic VS   02/15/24 1230 -- 79 -- -- -- -- -- --   02/15/24 11:40:59 97.4 °F (36.3 °C) Abnormal  53 Abnormal  -- 107/73 84 96 % -- --   02/15/24 0900 -- -- -- -- -- -- None (Room air) --   02/15/24 07:14:19 97.8 °F (36.6 °C) -- 18 122/91 101 -- None (Room air) Lying   02/15/24 06:35:58 -- -- -- 126/94 105 -- -- --   02/15/24 01:27:40 -- 101 -- 129/96 107 95 % -- --   02/14/24 22:56:13 97.6 °F (36.4 °C) 38 Abnormal  -- 95/61 72 96 % -- --   02/14/24 21:30:38 97.9 °F (36.6 °C) 101 -- -- -- 95 % -- --   02/14/24 21:21:27 -- -- -- 152/108 Abnormal  123 -- -- --     Pertinent Labs/Diagnostic Test Results:   02/13 CTA ED chest PE study:  1.  No acute pulmonary embolism.     2.  Small lung nodules, stable from September 2023. Based on current Fleischner Society 2017 Guidelines on incidental pulmonary nodule, no routine follow-up is needed if the patient is low risk. If the patient is high risk, optional follow-up chest CT at   12 months can be considered.     3.  Bilateral nonobstructing  renal calculi     02/14   EKG result: Sinus rhythm with 2:1 A-V conduction     Results from last 7 days   Lab Units 02/13/24  0101   SARS-COV-2  Negative     Results from last 7 days   Lab Units 02/15/24  0149 02/14/24  0000 02/13/24  0046   WBC Thousand/uL 5.54  --  7.19   HEMOGLOBIN g/dL 15.9  --  15.9   HEMATOCRIT % 47.9  --  48.0   PLATELETS Thousands/uL 245 246 248   NEUTROS ABS Thousands/µL 3.90  --  4.59         Results from last 7 days   Lab Units 02/15/24  0149 02/14/24  0516 02/13/24  1010 02/13/24  0101 02/13/24  0046   SODIUM mmol/L 139 140 141  --  139   POTASSIUM mmol/L 4.0 3.8 4.0  --  3.9   CHLORIDE mmol/L 106 109* 109*  --  106   CO2 mmol/L 25 26 25  --  23   ANION GAP mmol/L 8 5 7  --  10   BUN mg/dL 20 18 21  --  28*   CREATININE mg/dL 0.98 1.00 1.04  --  1.30   EGFR ml/min/1.73sq m 84 82 78  --  60   CALCIUM mg/dL 9.2 9.2 9.5  --  9.8   MAGNESIUM mg/dL 2.1  --   --  2.3  --      Results from last 7 days   Lab Units 02/13/24  0046   AST U/L 21   ALT U/L 24   ALK PHOS U/L 52   TOTAL PROTEIN g/dL 6.9   ALBUMIN g/dL 4.5   TOTAL BILIRUBIN mg/dL 0.51     Results from last 7 days   Lab Units 02/15/24  1206 02/15/24  0816   POC GLUCOSE mg/dl 104 87     Results from last 7 days   Lab Units 02/15/24  0149 02/14/24  0516 02/13/24  1010 02/13/24  0046   GLUCOSE RANDOM mg/dL 112 96 83 89             Results from last 7 days   Lab Units 02/13/24  0101   CK TOTAL U/L 182     Results from last 7 days   Lab Units 02/13/24  0430 02/13/24  0242 02/13/24  0046   HS TNI 0HR ng/L  --   --  9   HS TNI 2HR ng/L  --  10  --    HSTNI D2 ng/L  --  1  --    HS TNI 4HR ng/L 10  --   --    HSTNI D4 ng/L 1  --   --      Results from last 7 days   Lab Units 02/13/24 0101   D-DIMER QUANTITATIVE ug/ml FEU 0.88*         Results from last 7 days   Lab Units 02/13/24 0101   TSH 3RD GENERATON uIU/mL 4.014                     Results from last 7 days   Lab Units 02/13/24 0101   BNP pg/mL 59             Results from last 7 days    Lab Units 02/13/24  0101   INFLUENZA A PCR  Negative   INFLUENZA B PCR  Negative   RSV PCR  Negative         Results from last 7 days   Lab Units 02/13/24  1010   AMPH/METH  Negative   BARBITURATE UR  Negative   BENZODIAZEPINE UR  Negative   COCAINE UR  Negative   METHADONE URINE  Negative   OPIATE UR  Negative   PCP UR  Negative   THC UR  Negative         ED Treatment:   Medication Administration - No Administrations Displayed (No Start Event Found)       None          Past Medical History:   Diagnosis Date    Hyperlipidemia     Hypertension      Present on Admission:   Paroxysmal atrial tachycardia   Hypertension   HLD (hyperlipidemia)      Admitting Diagnosis: Tachycardia [R00.0]  Age/Sex: 58 y.o. male  Admission Orders:  SCD  Tele    Scheduled Medications:  cefazolin, 1,000 mg, Intravenous, Once  heparin (porcine), 5,000 Units, Subcutaneous, Q8H SYED  metoprolol tartrate, 25 mg, Oral, Q6H  pravastatin, 40 mg, Oral, Daily With Dinner  vancomycin, 1,000 mg, Intravenous, Once      Continuous IV Infusions: none     PRN Meds:  acetaminophen, 650 mg, Oral, Q6H PRN  aluminum-magnesium hydroxide-simethicone, 30 mL, Oral, Q4H PRN  ondansetron, 4 mg, Intravenous, Q6H PRN        IP CONSULT TO ELECTROPHYSIOLOGY    Network Utilization Review Department  ATTENTION: Please call with any questions or concerns to [phone] and carefully listen to the prompts so that you are directed to the right person. All voicemails are confidential.   For Discharge needs, contact Care Management DC Support Team at [phone] opt. [phone]  Send all requests for admission clinical reviews, approved or denied determinations and any other requests to dedicated fax number below belonging to the campus where the patient is receiving treatment. List of dedicated fax numbers for the Facilities:  FACILITY NAME UR FAX NUMBER   ADMISSION DENIALS (Administrative/Medical Necessity) [phone]   DISCHARGE SUPPORT TEAM (NETWORK) [phone]   PARENT  CHILD HEALTH (Maternity/NICU/Pediatrics) [phone]   [facility] [phone]   [facility] [phone]   [facility] [phone]   [facility] [phone]   Critical access hospital [phone]   [facility] [phone]   [facility] [phone]   [facility] [phone]   [facility] [phone]   [facility] [phone]   [facility] [phone]   [facility] [phone]

## 2024-02-15 NOTE — CASE MANAGEMENT
Case Management Assessment & Discharge Planning Note    Patient name Markie Robert  Location /-01 MRN [id number]  : 1965 Date 2/15/2024       Current Admission Date: 2024  Current Admission Diagnosis:Paroxysmal atrial tachycardia   Patient Active Problem List    Diagnosis Date Noted    Sinus pause 2024    Paroxysmal atrial tachycardia 2024    Nephrolithiasis 2023    Insomnia 2022    Arthralgia 2021    Hypertension 2021    History of Lyme disease 2021    Grade II hemorrhoids 2018    Dyspepsia 2018    Umbilical hernia without obstruction and without gangrene 2018    HLD (hyperlipidemia) 2018    Well adult exam 2018    Screen for colon cancer 2018    Screening for diabetes mellitus 2018    Screening for cardiovascular condition 2018    Screening for deficiency anemia 2018    Thyroid disorder screen 2018    Screening for prostate cancer 2018      LOS (days): 1  Geometric Mean LOS (GMLOS) (days):   Days to GMLOS:     OBJECTIVE:    Risk of Unplanned Readmission Score: 6.01         Current admission status: Inpatient       Preferred Pharmacy:   [id number] - [place] - [address] [place] [address]  Phone: [phone] Fax: [phone]    Primary Care Provider: Xenia Aly DO    Primary Insurance: BLUE CROSS  Secondary Insurance:     ASSESSMENT:  Active Health Care Proxies    There are no active Health Care Proxies on file.       Advance Directives  Does patient have a Health Care POA?: No  Does patient have Advance Directives?: No              Patient Information  Admitted from:: Facility  Mental Status: Alert  During Assessment patient was accompanied by: Spouse  Assessment information provided by:: Patient  Primary Caregiver: Self  Support Systems: Spouse/significant other  Home entry access options. Select all that apply.: Stairs  Number  of steps to enter home.: 2  Do the steps have railings?: Yes  Type of Current Residence: [facility]  Living Arrangements: Lives w/ Spouse/significant other    Activities of Daily Living Prior to Admission  Functional Status: Independent  Completes ADLs independently?: Yes  Ambulates independently?: Yes  Does patient use assisted devices?: No  Does patient currently own DME?: No  Does patient have a history of Outpatient Therapy (PT/OT)?: No  Does the patient have a history of Short-Term Rehab?: No  Does patient have a history of HHC?: No         Patient Information Continued  Income Source: Employed  Does patient have prescription coverage?: Yes  Does patient receive dialysis treatments?: No  Does patient have a history of substance abuse?: No  Does patient have a history of Mental Health Diagnosis?: No         Means of Transportation  Means of Transport to Appts:: Drives Self      Social Determinants of Health (SDOH)      Flowsheet Row Most Recent Value   Housing Stability    In the last 12 months, was there a time when you were not able to pay the mortgage or rent on time? N   In the last 12 months, how many places have you lived? 1   Transportation Needs    In the past 12 months, has lack of transportation kept you from medical appointments or from getting medications? no   In the past 12 months, has lack of transportation kept you from meetings, work, or from getting things needed for daily living? No   Food Insecurity    Within the past 12 months, you worried that your food would run out before you got the money to buy more. Never true   Within the past 12 months, the food you bought just didn't last and you didn't have money to get more. Never true   Utilities    In the past 12 months has the electric, gas, oil, or water company threatened to shut off services in your home? No            DISCHARGE DETAILS:    Discharge planning discussed with:: pt  Freedom of Choice: Yes                   Contacts  Patient  Contacts: wife Jessica  Relationship to Patient:: Family  Contact Method: Phone  Phone Number: [phone]  Reason/Outcome: Continuity of Care, Emergency Contact, Discharge Planning                   Would you like to participate in our [facility] service program?  : No - Declined                                                 Additional Comments: resides with wife, I PTA

## 2024-02-15 NOTE — PROGRESS NOTES
[facility]  Progress Note  Name: Markie Robert I  MRN: [id number]  Unit/Bed#: -01 I Date of Admission: 2/14/2024   Date of Service: 2/15/2024 I Hospital Day: 1    Assessment/Plan   * Paroxysmal atrial tachycardia  Assessment & Plan  Pt initially presented to [facility] for initial concern for SVT on EKG, however on telemetry monitoring noted to have atrial tachycardia with frequent pauses and bradycardia   Transferred to [facility] for EP evaluation  EP following  Awaiting further recommendations  Continue telemetry monitoring   Defer metoprolol dosing to EP team    Sinus pause  Assessment & Plan  EKG showed sinus rhythm with 2:1 A-V conduction, heart rate 36, this lasted for about 40 minutes.  Pt was symptomatic with chest tightness  Monitor on telemetry  EP consulted for possible PPM vs. Ablation   Currently on lopressor, appreciate EP recommendations    Hypertension  Assessment & Plan  BP overall stable on lopressor 25 mg Q6H alone  Continue to hold amlodipine and valsartan   Monitor     HLD (hyperlipidemia)  Assessment & Plan  Continue pravastatin 40 mg daily in place of home simvastatin due to non formulary          VTE Pharmacologic Prophylaxis:   Moderate Risk (Score 3-4) - Pharmacological DVT Prophylaxis Ordered: heparin.    Mobility:   Basic Mobility Inpatient Raw Score: 24  JH-HLM Goal: 8: Walk 250 feet or more  JH-HLM Achieved: 8: Walk 250 feet ot more  HLM Goal achieved. Continue to encourage appropriate mobility.    Patient Centered Rounds: I evaluated the patient without nursing staff present due to speaking to nurse outside patient's room     Discussions with Specialists or Other Care Team Provider: Discussed with RN, CM, EP and reviewed previous notes     Education and Discussions with Family / Patient: Patient declined call to .     Total Time Spent on Date of Encounter in care of patient: 32 mins. This time was spent on one or more  of the following: performing physical exam; counseling and coordination of care; obtaining or reviewing history; documenting in the medical record; reviewing/ordering tests, medications or procedures; communicating with other healthcare professionals and discussing with patient's family/caregivers.    Current Length of Stay: 1 day(s)  Current Patient Status: Inpatient   Certification Statement: The patient will continue to require additional inpatient hospital stay due to PPM placement today, ongoing EP recommendations, telemetry monitoring   Discharge Plan: Anticipate discharge in 24-48 hrs to home.    Code Status: Level 1 - Full Code    Subjective:   Pt reports that currently he is feeling well. However overnight was having multiple episodes of chest tightness and heart palpitations. Currently denies any chest pain, shortness of breath, nausea, vomiting or abdominal pain.     Objective:     Vitals:   Temp (24hrs), Av.8 °F (36.6 °C), Min:97.6 °F (36.4 °C), Max:98 °F (36.7 °C)    Temp:  [97.6 °F (36.4 °C)-98 °F (36.7 °C)] 97.8 °F (36.6 °C)  HR:  [] 101  Resp:  [16-18] 18  BP: ()/() 122/91  SpO2:  [95 %-96 %] 95 %  Body mass index is 24.28 kg/m².     Input and Output Summary (last 24 hours):     Intake/Output Summary (Last 24 hours) at 2/15/2024 0923  Last data filed at 2024 2121  Gross per 24 hour   Intake 0 ml   Output 0 ml   Net 0 ml       Physical Exam:   Physical Exam  Vitals reviewed.   Constitutional:       General: He is not in acute distress.     Comments: Pt is in no acute distress lying in his hospital bed resting comfortably    HENT:      Head: Normocephalic.   Cardiovascular:      Rate and Rhythm: Normal rate. Rhythm irregular.   Pulmonary:      Effort: No respiratory distress.      Breath sounds: Normal breath sounds. No wheezing.   Abdominal:      General: Bowel sounds are normal.      Palpations: Abdomen is soft.   Musculoskeletal:      Right lower leg: No edema.      Left  lower leg: No edema.   Skin:     General: Skin is warm and dry.   Neurological:      Mental Status: He is alert.   Psychiatric:         Mood and Affect: Mood normal.          Additional Data:     Labs:  Results from last 7 days   Lab Units 02/15/24  0149   WBC Thousand/uL 5.54   HEMOGLOBIN g/dL 15.9   HEMATOCRIT % 47.9   PLATELETS Thousands/uL 245   NEUTROS PCT % 71   LYMPHS PCT % 18   MONOS PCT % 9   EOS PCT % 2     Results from last 7 days   Lab Units 02/15/24  0149 02/13/24  1010 02/13/24  0046   SODIUM mmol/L 139   < > 139   POTASSIUM mmol/L 4.0   < > 3.9   CHLORIDE mmol/L 106   < > 106   CO2 mmol/L 25   < > 23   BUN mg/dL 20   < > 28*   CREATININE mg/dL 0.98   < > 1.30   ANION GAP mmol/L 8   < > 10   CALCIUM mg/dL 9.2   < > 9.8   ALBUMIN g/dL  --   --  4.5   TOTAL BILIRUBIN mg/dL  --   --  0.51   ALK PHOS U/L  --   --  52   ALT U/L  --   --  24   AST U/L  --   --  21   GLUCOSE RANDOM mg/dL 112   < > 89    < > = values in this interval not displayed.         Results from last 7 days   Lab Units 02/15/24  0816   POC GLUCOSE mg/dl 87               Lines/Drains:  Invasive Devices       Peripheral Intravenous Line  Duration             Peripheral IV 02/13/24 Distal;Dorsal (posterior);Left Forearm 2 days    Peripheral IV 02/13/24 Left Antecubital 2 days              Drain  Duration             Ureteral Internal Stent Right ureter 6 Fr. 141 days                      Telemetry:  Telemetry Orders (From admission, onward)               24 Hour Telemetry Monitoring  Continuous x 24 Hours (Telem)        Question:  Reason for 24 Hour Telemetry  Answer:  Arrhythmias requiring acute medical intervention / PPM or ICD malfunction                     Telemetry Reviewed:  episodes of tachycardia, NSR  Indication for Continued Telemetry Use: Awaiting PCI/EP Study/CABG             Imaging: Reviewed radiology reports from this admission including: CTA chest PE study     Recent Cultures (last 7 days):         Last 24 Hours Medication  List:   Current Facility-Administered Medications   Medication Dose Route Frequency Provider Last Rate    acetaminophen  650 mg Oral Q6H PRN Polo Sanders MD      aluminum-magnesium hydroxide-simethicone  30 mL Oral Q4H PRN Polo Sanders MD      cefazolin  1,000 mg Intravenous Once Billie Ledezma PA-C      heparin (porcine)  5,000 Units Subcutaneous Q8H [facility] Polo Sanders MD      metoprolol tartrate  25 mg Oral Q6H Polo Sanders MD      ondansetron  4 mg Intravenous Q6H PRN Polo Sanders MD      pravastatin  40 mg Oral Daily With Dinner Polo Sanders MD      vancomycin  1,000 mg Intravenous Once Billie Ledezma PA-C          Today, Patient Was Seen By: Indira Landry PA-C    **Please Note: This note may have been constructed using a voice recognition system.**

## 2024-02-15 NOTE — NURSING NOTE
Report called to Nery at [facility]. Pt transported via ALS on cardiac monitor. Pt belongings with pt at time of transfer including cell phone and . Pt called his wife to let her know of transfer. Pt stable at time of transfer.

## 2024-02-15 NOTE — ANESTHESIA PREPROCEDURE EVALUATION
"Procedure:  Cardiac pacer implant (Chest)  Cardiac eps/svt ablation (Chest)    Relevant Problems   ANESTHESIA (within normal limits)      CARDIO   (+) HLD (hyperlipidemia)   (+) Hypertension   (+) Paroxysmal atrial tachycardia   (+) Sinus pause      ENDO (within normal limits)      GI/HEPATIC (within normal limits)      /RENAL   (+) Nephrolithiasis      HEMATOLOGY (within normal limits)      NEURO/PSYCH (within normal limits)      PULMONARY (within normal limits)      Other   (+) Dyspepsia   (+) History of Lyme disease      SVT very suggestive of PAT  Conversion pauses up to 2 seconds  Sinus bradycardia at baseline    EKG 2/14/2024:  Sinus rhythm with 2:1 A-V conduction     TTE 2/2024:    Left Ventricle: Left ventricular cavity size is normal. Wall thickness is normal. The left ventricular ejection fraction is 56% by biplane measurement. Systolic function is normal. Wall motion is normal.    Aortic Valve: The aortic valve is trileaflet.  There is aortic sclerosis.    Mitral Valve: There is mild regurgitation.    Tricuspid Valve: There is mild regurgitation. The right ventricular systolic pressure is normal. The estimated right ventricular systolic pressure is 13.00 mmHg.    Aorta: The aortic root is normal in size. The ascending aorta is mildly dilated. The aortic root is 3.40 cm. The ascending aorta is 4 cm.    Lab Results   Component Value Date    WBC 5.54 02/15/2024    HGB 15.9 02/15/2024    HCT 47.9 02/15/2024    MCV 92 02/15/2024     02/15/2024     Lab Results   Component Value Date    SODIUM 139 02/15/2024    K 4.0 02/15/2024     02/15/2024    CO2 25 02/15/2024    BUN 20 02/15/2024    CREATININE 0.98 02/15/2024    GLUC 112 02/15/2024    CALCIUM 9.2 02/15/2024     No results found for: \"INR\", \"PROTIME\"  No results found for: \"HGBA1C\"       Physical Exam    Airway    Mallampati score: II  TM Distance: >3 FB  Neck ROM: full     Dental   No notable dental hx     Cardiovascular  Cardiovascular exam " normal    Pulmonary  Pulmonary exam normal     Other Findings        Anesthesia Plan  ASA Score- 3     Anesthesia Type- IV sedation with anesthesia with ASA Monitors.         Additional Monitors:     Airway Plan:            Plan Factors-Exercise tolerance (METS): >4 METS.    Chart reviewed. EKG reviewed.  Existing labs reviewed. Patient summary reviewed.                  Induction- intravenous.    Postoperative Plan-     Informed Consent- Anesthetic plan and risks discussed with patient.  I personally reviewed this patient with the CRNA. Discussed and agreed on the Anesthesia Plan with the CRNA..

## 2024-02-15 NOTE — ASSESSMENT & PLAN NOTE
EKG showed sinus rhythm with 2:1 A-V conduction, heart rate 36, this lasted for about 40 minutes.  Pt was symptomatic with chest tightness  Monitor on telemetry  EP consulted for possible PPM vs. Ablation   Currently on lopressor, appreciate EP recommendations

## 2024-02-15 NOTE — ASSESSMENT & PLAN NOTE
BP overall stable on lopressor 25 mg Q6H alone  Continue to hold amlodipine and valsartan   Monitor

## 2024-02-16 ENCOUNTER — APPOINTMENT (INPATIENT)
Dept: RADIOLOGY | Facility: HOSPITAL | Age: 59
DRG: 274 | End: 2024-02-16
Payer: COMMERCIAL

## 2024-02-16 ENCOUNTER — ANESTHESIA (INPATIENT)
Dept: NON INVASIVE DIAGNOSTICS | Facility: HOSPITAL | Age: 59
DRG: 274 | End: 2024-02-16
Payer: COMMERCIAL

## 2024-02-16 LAB
ANION GAP SERPL CALCULATED.3IONS-SCNC: 7 MMOL/L
ATRIAL RATE: 69 BPM
BASOPHILS # BLD AUTO: 0.02 THOUSANDS/ÂΜL (ref 0–0.1)
BASOPHILS NFR BLD AUTO: 0 % (ref 0–1)
BUN SERPL-MCNC: 18 MG/DL (ref 5–25)
CALCIUM SERPL-MCNC: 9 MG/DL (ref 8.4–10.2)
CHLORIDE SERPL-SCNC: 106 MMOL/L (ref 96–108)
CO2 SERPL-SCNC: 26 MMOL/L (ref 21–32)
CREAT SERPL-MCNC: 1.05 MG/DL (ref 0.6–1.3)
EOSINOPHIL # BLD AUTO: 0.07 THOUSAND/ÂΜL (ref 0–0.61)
EOSINOPHIL NFR BLD AUTO: 2 % (ref 0–6)
ERYTHROCYTE [DISTWIDTH] IN BLOOD BY AUTOMATED COUNT: 13.7 % (ref 11.6–15.1)
GFR SERPL CREATININE-BSD FRML MDRD: 77 ML/MIN/1.73SQ M
GLUCOSE SERPL-MCNC: 106 MG/DL (ref 65–140)
HCT VFR BLD AUTO: 44 % (ref 36.5–49.3)
HGB BLD-MCNC: 14.4 G/DL (ref 12–17)
IMM GRANULOCYTES # BLD AUTO: 0.01 THOUSAND/UL (ref 0–0.2)
IMM GRANULOCYTES NFR BLD AUTO: 0 % (ref 0–2)
KCT BLD-ACNC: 277 SEC (ref 89–137)
KCT BLD-ACNC: 315 SEC (ref 89–137)
KCT BLD-ACNC: 315 SEC (ref 89–137)
KCT BLD-ACNC: 328 SEC (ref 89–137)
LYMPHOCYTES # BLD AUTO: 0.84 THOUSANDS/ÂΜL (ref 0.6–4.47)
LYMPHOCYTES NFR BLD AUTO: 18 % (ref 14–44)
MCH RBC QN AUTO: 30.1 PG (ref 26.8–34.3)
MCHC RBC AUTO-ENTMCNC: 32.7 G/DL (ref 31.4–37.4)
MCV RBC AUTO: 92 FL (ref 82–98)
MONOCYTES # BLD AUTO: 0.42 THOUSAND/ÂΜL (ref 0.17–1.22)
MONOCYTES NFR BLD AUTO: 9 % (ref 4–12)
NEUTROPHILS # BLD AUTO: 3.44 THOUSANDS/ÂΜL (ref 1.85–7.62)
NEUTS SEG NFR BLD AUTO: 71 % (ref 43–75)
NRBC BLD AUTO-RTO: 0 /100 WBCS
P AXIS: 62 DEGREES
PLATELET # BLD AUTO: 223 THOUSANDS/UL (ref 149–390)
PMV BLD AUTO: 10.3 FL (ref 8.9–12.7)
POTASSIUM SERPL-SCNC: 3.9 MMOL/L (ref 3.5–5.3)
PR INTERVAL: 154 MS
QRS AXIS: 40 DEGREES
QRSD INTERVAL: 90 MS
QT INTERVAL: 408 MS
QTC INTERVAL: 437 MS
RBC # BLD AUTO: 4.79 MILLION/UL (ref 3.88–5.62)
SODIUM SERPL-SCNC: 139 MMOL/L (ref 135–147)
SPECIMEN SOURCE: ABNORMAL
T WAVE AXIS: 3 DEGREES
VENTRICULAR RATE: 69 BPM
WBC # BLD AUTO: 4.8 THOUSAND/UL (ref 4.31–10.16)

## 2024-02-16 PROCEDURE — 80048 BASIC METABOLIC PNL TOTAL CA: CPT | Performed by: STUDENT IN AN ORGANIZED HEALTH CARE EDUCATION/TRAINING PROGRAM

## 2024-02-16 PROCEDURE — 85347 COAGULATION TIME ACTIVATED: CPT

## 2024-02-16 PROCEDURE — C1731 CATH, EP, 20 OR MORE ELEC: HCPCS | Performed by: INTERNAL MEDICINE

## 2024-02-16 PROCEDURE — 02K83ZZ MAP CONDUCTION MECHANISM, PERCUTANEOUS APPROACH: ICD-10-PCS | Performed by: INTERNAL MEDICINE

## 2024-02-16 PROCEDURE — 93005 ELECTROCARDIOGRAM TRACING: CPT

## 2024-02-16 PROCEDURE — C1730 CATH, EP, 19 OR FEW ELECT: HCPCS | Performed by: INTERNAL MEDICINE

## 2024-02-16 PROCEDURE — C1893 INTRO/SHEATH, FIXED,NON-PEEL: HCPCS | Performed by: INTERNAL MEDICINE

## 2024-02-16 PROCEDURE — C1894 INTRO/SHEATH, NON-LASER: HCPCS | Performed by: INTERNAL MEDICINE

## 2024-02-16 PROCEDURE — 93653 COMPRE EP EVAL TX SVT: CPT | Performed by: INTERNAL MEDICINE

## 2024-02-16 PROCEDURE — 85025 COMPLETE CBC W/AUTO DIFF WBC: CPT | Performed by: STUDENT IN AN ORGANIZED HEALTH CARE EDUCATION/TRAINING PROGRAM

## 2024-02-16 PROCEDURE — 4A0234Z MEASUREMENT OF CARDIAC ELECTRICAL ACTIVITY, PERCUTANEOUS APPROACH: ICD-10-PCS | Performed by: INTERNAL MEDICINE

## 2024-02-16 PROCEDURE — 93010 ELECTROCARDIOGRAM REPORT: CPT | Performed by: INTERNAL MEDICINE

## 2024-02-16 PROCEDURE — 02583ZZ DESTRUCTION OF CONDUCTION MECHANISM, PERCUTANEOUS APPROACH: ICD-10-PCS | Performed by: INTERNAL MEDICINE

## 2024-02-16 PROCEDURE — 99231 SBSQ HOSP IP/OBS SF/LOW 25: CPT | Performed by: PHYSICIAN ASSISTANT

## 2024-02-16 PROCEDURE — 76937 US GUIDE VASCULAR ACCESS: CPT | Performed by: INTERNAL MEDICINE

## 2024-02-16 RX ORDER — EPHEDRINE SULFATE 50 MG/ML
INJECTION INTRAVENOUS AS NEEDED
Status: DISCONTINUED | OUTPATIENT
Start: 2024-02-16 | End: 2024-02-16

## 2024-02-16 RX ORDER — FENTANYL CITRATE 50 UG/ML
INJECTION, SOLUTION INTRAMUSCULAR; INTRAVENOUS AS NEEDED
Status: DISCONTINUED | OUTPATIENT
Start: 2024-02-16 | End: 2024-02-16

## 2024-02-16 RX ORDER — CEFAZOLIN SODIUM 2 G/50ML
2000 SOLUTION INTRAVENOUS ONCE
Status: DISCONTINUED | OUTPATIENT
Start: 2024-02-16 | End: 2024-02-17 | Stop reason: HOSPADM

## 2024-02-16 RX ORDER — ACETAMINOPHEN 325 MG/1
650 TABLET ORAL EVERY 4 HOURS PRN
Status: DISCONTINUED | OUTPATIENT
Start: 2024-02-16 | End: 2024-02-17 | Stop reason: HOSPADM

## 2024-02-16 RX ORDER — PROPOFOL 10 MG/ML
INJECTION, EMULSION INTRAVENOUS CONTINUOUS PRN
Status: DISCONTINUED | OUTPATIENT
Start: 2024-02-16 | End: 2024-02-16

## 2024-02-16 RX ORDER — PROTAMINE SULFATE 10 MG/ML
INJECTION, SOLUTION INTRAVENOUS AS NEEDED
Status: DISCONTINUED | OUTPATIENT
Start: 2024-02-16 | End: 2024-02-16

## 2024-02-16 RX ORDER — MIDAZOLAM HYDROCHLORIDE 2 MG/2ML
INJECTION, SOLUTION INTRAMUSCULAR; INTRAVENOUS AS NEEDED
Status: DISCONTINUED | OUTPATIENT
Start: 2024-02-16 | End: 2024-02-16

## 2024-02-16 RX ORDER — HEPARIN SODIUM 1000 [USP'U]/ML
INJECTION, SOLUTION INTRAVENOUS; SUBCUTANEOUS CODE/TRAUMA/SEDATION MEDICATION
Status: DISCONTINUED | OUTPATIENT
Start: 2024-02-16 | End: 2024-02-16 | Stop reason: HOSPADM

## 2024-02-16 RX ORDER — PROPOFOL 10 MG/ML
INJECTION, EMULSION INTRAVENOUS AS NEEDED
Status: DISCONTINUED | OUTPATIENT
Start: 2024-02-16 | End: 2024-02-16

## 2024-02-16 RX ORDER — PANTOPRAZOLE SODIUM 40 MG/1
40 TABLET, DELAYED RELEASE ORAL DAILY
Status: DISCONTINUED | OUTPATIENT
Start: 2024-02-17 | End: 2024-02-17 | Stop reason: HOSPADM

## 2024-02-16 RX ORDER — LIDOCAINE HYDROCHLORIDE 10 MG/ML
INJECTION, SOLUTION EPIDURAL; INFILTRATION; INTRACAUDAL; PERINEURAL CODE/TRAUMA/SEDATION MEDICATION
Status: DISCONTINUED | OUTPATIENT
Start: 2024-02-16 | End: 2024-02-16 | Stop reason: HOSPADM

## 2024-02-16 RX ORDER — SODIUM CHLORIDE 9 MG/ML
INJECTION, SOLUTION INTRAVENOUS CONTINUOUS PRN
Status: DISCONTINUED | OUTPATIENT
Start: 2024-02-16 | End: 2024-02-16

## 2024-02-16 RX ADMIN — FENTANYL CITRATE 50 MCG: 50 INJECTION INTRAMUSCULAR; INTRAVENOUS at 15:31

## 2024-02-16 RX ADMIN — PROPOFOL 30 MG: 10 INJECTION, EMULSION INTRAVENOUS at 13:20

## 2024-02-16 RX ADMIN — FENTANYL CITRATE 50 MCG: 50 INJECTION INTRAMUSCULAR; INTRAVENOUS at 12:36

## 2024-02-16 RX ADMIN — ACETAMINOPHEN 650 MG: 325 TABLET, FILM COATED ORAL at 22:09

## 2024-02-16 RX ADMIN — PROPOFOL 20 MG: 10 INJECTION, EMULSION INTRAVENOUS at 15:58

## 2024-02-16 RX ADMIN — SODIUM CHLORIDE: 0.9 INJECTION, SOLUTION INTRAVENOUS at 12:29

## 2024-02-16 RX ADMIN — PROPOFOL 20 MG: 10 INJECTION, EMULSION INTRAVENOUS at 13:27

## 2024-02-16 RX ADMIN — PROPOFOL 60 MG: 10 INJECTION, EMULSION INTRAVENOUS at 13:17

## 2024-02-16 RX ADMIN — PROPOFOL 20 MG: 10 INJECTION, EMULSION INTRAVENOUS at 13:33

## 2024-02-16 RX ADMIN — PROPOFOL 30 MG: 10 INJECTION, EMULSION INTRAVENOUS at 15:50

## 2024-02-16 RX ADMIN — PROPOFOL 20 MG: 10 INJECTION, EMULSION INTRAVENOUS at 13:44

## 2024-02-16 RX ADMIN — PROPOFOL 20 MG: 10 INJECTION, EMULSION INTRAVENOUS at 13:23

## 2024-02-16 RX ADMIN — PROPOFOL 30 MG: 10 INJECTION, EMULSION INTRAVENOUS at 13:40

## 2024-02-16 RX ADMIN — ACETAMINOPHEN 650 MG: 325 TABLET, FILM COATED ORAL at 17:40

## 2024-02-16 RX ADMIN — MIDAZOLAM 1 MG: 1 INJECTION INTRAMUSCULAR; INTRAVENOUS at 12:41

## 2024-02-16 RX ADMIN — FENTANYL CITRATE 25 MCG: 50 INJECTION INTRAMUSCULAR; INTRAVENOUS at 14:03

## 2024-02-16 RX ADMIN — PROPOFOL 20 MG: 10 INJECTION, EMULSION INTRAVENOUS at 13:30

## 2024-02-16 RX ADMIN — EPHEDRINE SULFATE 5 MG: 50 INJECTION, SOLUTION INTRAVENOUS at 13:53

## 2024-02-16 RX ADMIN — EPHEDRINE SULFATE 5 MG: 50 INJECTION, SOLUTION INTRAVENOUS at 13:29

## 2024-02-16 RX ADMIN — PROPOFOL 60 MCG/KG/MIN: 10 INJECTION, EMULSION INTRAVENOUS at 15:51

## 2024-02-16 RX ADMIN — PROPOFOL 20 MG: 10 INJECTION, EMULSION INTRAVENOUS at 13:21

## 2024-02-16 RX ADMIN — PROTAMINE SULFATE 60 MG: 10 INJECTION, SOLUTION INTRAVENOUS at 15:56

## 2024-02-16 RX ADMIN — SODIUM CHLORIDE: 0.9 INJECTION, SOLUTION INTRAVENOUS at 15:28

## 2024-02-16 RX ADMIN — PROPOFOL 20 MG: 10 INJECTION, EMULSION INTRAVENOUS at 15:52

## 2024-02-16 RX ADMIN — MIDAZOLAM 1 MG: 1 INJECTION INTRAMUSCULAR; INTRAVENOUS at 12:55

## 2024-02-16 RX ADMIN — MIDAZOLAM 2 MG: 1 INJECTION INTRAMUSCULAR; INTRAVENOUS at 12:29

## 2024-02-16 RX ADMIN — FENTANYL CITRATE 50 MCG: 50 INJECTION INTRAMUSCULAR; INTRAVENOUS at 14:57

## 2024-02-16 RX ADMIN — HEPARIN SODIUM 5000 UNITS: 5000 INJECTION INTRAVENOUS; SUBCUTANEOUS at 06:41

## 2024-02-16 RX ADMIN — FENTANYL CITRATE 25 MCG: 50 INJECTION INTRAMUSCULAR; INTRAVENOUS at 13:58

## 2024-02-16 NOTE — PLAN OF CARE
Problem: PAIN - ADULT  Goal: Verbalizes/displays adequate comfort level or baseline comfort level  Description: Interventions:  - Encourage patient to monitor pain and request assistance  - Assess pain using appropriate pain scale  - Administer analgesics based on type and severity of pain and evaluate response  - Implement non-pharmacological measures as appropriate and evaluate response  - Consider cultural and social influences on pain and pain management  - Notify physician/advanced practitioner if interventions unsuccessful or patient reports new pain  Outcome: Progressing     Problem: INFECTION - ADULT  Goal: Absence or prevention of progression during hospitalization  Description: INTERVENTIONS:  - Assess and monitor for signs and symptoms of infection  - Monitor lab/diagnostic results  - Monitor all insertion sites, i.e. indwelling lines, tubes, and drains  - Monitor endotracheal if appropriate and nasal secretions for changes in amount and color  - [place] appropriate cooling/warming therapies per order  - Administer medications as ordered  - Instruct and encourage patient and family to use good hand hygiene technique  - Identify and instruct in appropriate isolation precautions for identified infection/condition  Outcome: Progressing  Goal: Absence of fever/infection during neutropenic period  Description: INTERVENTIONS:  - Monitor WBC    Outcome: Progressing     Problem: SAFETY ADULT  Goal: Patient will remain free of falls  Description: INTERVENTIONS:  - Educate patient/family on patient safety including physical limitations  - Instruct patient to call for assistance with activity   - Consult OT/PT to assist with strengthening/mobility   - Keep Call bell within reach  - Keep bed low and locked with side rails adjusted as appropriate  - Keep care items and personal belongings within reach  - Initiate and maintain comfort rounds  - Make Fall Risk Sign visible to staff  - Apply yellow socks and bracelet  for high fall risk patients  - Consider moving patient to room near nurses station  Outcome: Progressing  Goal: Maintain or return to baseline ADL function  Description: INTERVENTIONS:  -  Assess patient's ability to carry out ADLs; assess patient's baseline for ADL function and identify physical deficits which impact ability to perform ADLs (bathing, care of mouth/teeth, toileting, grooming, dressing, etc.)  - Assess/evaluate cause of self-care deficits   - Assess range of motion  - Assess patient's mobility; develop plan if impaired  - Assess patient's need for assistive devices and provide as appropriate  - Encourage maximum independence but intervene and supervise when necessary  - Involve family in performance of ADLs  - Assess for home care needs following discharge   - Consider OT consult to assist with ADL evaluation and planning for discharge  - Provide patient education as appropriate  Outcome: Progressing  Goal: Maintains/Returns to pre admission functional level  Description: INTERVENTIONS:  - Perform AM-PAC 6 Click Basic Mobility/ Daily Activity assessment daily.  - Set and communicate daily mobility goal to care team and patient/family/caregiver.   - Collaborate with rehabilitation services on mobility goals if consulted  - Out of bed for toileting  - Record patient progress and toleration of activity level   Outcome: Progressing     Problem: DISCHARGE PLANNING  Goal: Discharge to home or other facility with appropriate resources  Description: INTERVENTIONS:  - Identify barriers to discharge w/patient and caregiver  - Arrange for needed discharge resources and transportation as appropriate  - Identify discharge learning needs (meds, wound care, etc.)  - Arrange for interpretive services to assist at discharge as needed  - Refer to Case Management Department for coordinating discharge planning if the patient needs post-hospital services based on physician/advanced practitioner order or complex needs  related to functional status, cognitive ability, or social support system  Outcome: Progressing     Problem: Knowledge Deficit  Goal: Patient/family/caregiver demonstrates understanding of disease process, treatment plan, medications, and discharge instructions  Description: Complete learning assessment and assess knowledge base.  Interventions:  - Provide teaching at level of understanding  - Provide teaching via preferred learning methods  Outcome: Progressing

## 2024-02-16 NOTE — ASSESSMENT & PLAN NOTE
Pt with conversion pauses from a. Tach to sinus rhythm   Pt was symptomatic with chest tightness  Monitor on telemetry  EP following for ablation today

## 2024-02-16 NOTE — ANESTHESIA POSTPROCEDURE EVALUATION
Post-Op Assessment Note    CV Status:  Stable  Pain Score: 0    Pain management: adequate       Mental Status:  Alert and awake   Hydration Status:  Euvolemic   PONV Controlled:  Controlled   Airway Patency:  Patent     Post Op Vitals Reviewed: Yes    No anethesia notable event occurred.    Staff: CRNA               BP   112/67   Temp   97.4   Pulse  65   Resp   16   SpO2   98 ra

## 2024-02-16 NOTE — PLAN OF CARE
Problem: PAIN - ADULT  Goal: Verbalizes/displays adequate comfort level or baseline comfort level  Description: Interventions:  - Encourage patient to monitor pain and request assistance  - Assess pain using appropriate pain scale  - Administer analgesics based on type and severity of pain and evaluate response  - Implement non-pharmacological measures as appropriate and evaluate response  - Consider cultural and social influences on pain and pain management  - Notify physician/advanced practitioner if interventions unsuccessful or patient reports new pain  Outcome: Progressing     Problem: INFECTION - ADULT  Goal: Absence or prevention of progression during hospitalization  Description: INTERVENTIONS:  - Assess and monitor for signs and symptoms of infection  - Monitor lab/diagnostic results  - Monitor all insertion sites, i.e. indwelling lines, tubes, and drains  - Monitor endotracheal if appropriate and nasal secretions for changes in amount and color  - [place] appropriate cooling/warming therapies per order  - Administer medications as ordered  - Instruct and encourage patient and family to use good hand hygiene technique  - Identify and instruct in appropriate isolation precautions for identified infection/condition  Outcome: Progressing  Goal: Absence of fever/infection during neutropenic period  Description: INTERVENTIONS:  - Monitor WBC    Outcome: Progressing     Problem: SAFETY ADULT  Goal: Patient will remain free of falls  Description: INTERVENTIONS:  - Educate patient/family on patient safety including physical limitations  - Instruct patient to call for assistance with activity   - Consult OT/PT to assist with strengthening/mobility   - Keep Call bell within reach  - Keep bed low and locked with side rails adjusted as appropriate  - Keep care items and personal belongings within reach  - Initiate and maintain comfort rounds  - Make Fall Risk Sign visible to staff  - Offer Toileting every  Hours,  in advance of need  - Initiate/Maintain alarm  - Obtain necessary fall risk management equipment:   - Apply yellow socks and bracelet for high fall risk patients  - Consider moving patient to room near nurses station  Outcome: Progressing  Goal: Maintain or return to baseline ADL function  Description: INTERVENTIONS:  -  Assess patient's ability to carry out ADLs; assess patient's baseline for ADL function and identify physical deficits which impact ability to perform ADLs (bathing, care of mouth/teeth, toileting, grooming, dressing, etc.)  - Assess/evaluate cause of self-care deficits   - Assess range of motion  - Assess patient's mobility; develop plan if impaired  - Assess patient's need for assistive devices and provide as appropriate  - Encourage maximum independence but intervene and supervise when necessary  - Involve family in performance of ADLs  - Assess for home care needs following discharge   - Consider OT consult to assist with ADL evaluation and planning for discharge  - Provide patient education as appropriate  Outcome: Progressing  Goal: Maintains/Returns to pre admission functional level  Description: INTERVENTIONS:  - Perform AM-PAC 6 Click Basic Mobility/ Daily Activity assessment daily.  - Set and communicate daily mobility goal to care team and patient/family/caregiver.   - Collaborate with rehabilitation services on mobility goals if consulted  - Perform Range of Motion  times a day.  - Reposition patient every  hours.  - Dangle patient  times a day  - Stand patient  times a day  - Ambulate patient  times a day  - Out of bed to chair  times a day   - Out of bed for meals  times a day  - Out of bed for toileting  - Record patient progress and toleration of activity level   Outcome: Progressing     Problem: DISCHARGE PLANNING  Goal: Discharge to home or other facility with appropriate resources  Description: INTERVENTIONS:  - Identify barriers to discharge w/patient and caregiver  - Arrange for  needed discharge resources and transportation as appropriate  - Identify discharge learning needs (meds, wound care, etc.)  - Arrange for interpretive services to assist at discharge as needed  - Refer to Case Management Department for coordinating discharge planning if the patient needs post-hospital services based on physician/advanced practitioner order or complex needs related to functional status, cognitive ability, or social support system  Outcome: Progressing     Problem: Knowledge Deficit  Goal: Patient/family/caregiver demonstrates understanding of disease process, treatment plan, medications, and discharge instructions  Description: Complete learning assessment and assess knowledge base.  Interventions:  - Provide teaching at level of understanding  - Provide teaching via preferred learning methods  Outcome: Progressing

## 2024-02-16 NOTE — ASSESSMENT & PLAN NOTE
Pt initially presented to [facility] for initial concern for SVT on EKG, however on telemetry monitoring noted to have atrial tachycardia with frequent pauses and bradycardia   Transferred to [facility] for EP evaluation  EP following,  Plan for ablation today, NPO status  Continue to hold metoprolol today   Continue telemetry monitoring

## 2024-02-16 NOTE — PROGRESS NOTES
[facility]  Progress Note  Name: Markie Robert I  MRN: [id number]  Unit/Bed#: -01 I Date of Admission: 2/14/2024   Date of Service: 2/16/2024 I Hospital Day: 2    Assessment/Plan   * Paroxysmal atrial tachycardia  Assessment & Plan  Pt initially presented to [facility] for initial concern for SVT on EKG, however on telemetry monitoring noted to have atrial tachycardia with frequent pauses and bradycardia   Transferred to [facility] for EP evaluation  EP following,  Plan for ablation today, NPO status  Continue to hold metoprolol today   Continue telemetry monitoring     Sinus pause  Assessment & Plan  Pt with conversion pauses from a. Tach to sinus rhythm   Pt was symptomatic with chest tightness  Monitor on telemetry  EP following for ablation today      Hypertension  Assessment & Plan  Pt with soft pressures here, overall stabilized so far today   Lopressor, amlodipine and valsartan on hold   Monitor     HLD (hyperlipidemia)  Assessment & Plan  Continue pravastatin 40 mg daily in place of home simvastatin due to non formulary          VTE Pharmacologic Prophylaxis:   Moderate Risk (Score 3-4) - Pharmacological DVT Prophylaxis Ordered: heparin.    Mobility:   Basic Mobility Inpatient Raw Score: 24  JH-HLM Goal: 8: Walk 250 feet or more  JH-HLM Achieved: 8: Walk 250 feet ot more  HLM Goal achieved. Continue to encourage appropriate mobility.    Patient Centered Rounds: I evaluated the patient without nursing staff present due to speaking to nurse outside patient's room     Discussions with Specialists or Other Care Team Provider: Discussed with RN, EP, CM and reviewed previous notes    Education and Discussions with Family / Patient: Patient declined call to .     Total Time Spent on Date of Encounter in care of patient: 32 mins. This time was spent on one or more of the following: performing physical exam; counseling and coordination of care;  obtaining or reviewing history; documenting in the medical record; reviewing/ordering tests, medications or procedures; communicating with other healthcare professionals and discussing with patient's family/caregivers.    Current Length of Stay: 2 day(s)  Current Patient Status: Inpatient   Certification Statement: The patient will continue to require additional inpatient hospital stay due to EP ablation today, telemetry monitoring  Discharge Plan: Anticipate discharge in 24-48 hrs to home.    Code Status: Level 1 - Full Code    Subjective:   Pt reports that he is doing well today. States that last night he had some low blood pressures and was not feeling well but this is now resolved. Denies any chest pain, shortness of breath, abdominal pain, nausea or vomiting.     Objective:     Vitals:   Temp (24hrs), Av.6 °F (36.4 °C), Min:97.4 °F (36.3 °C), Max:97.8 °F (36.6 °C)    Temp:  [97.4 °F (36.3 °C)-97.8 °F (36.6 °C)] 97.7 °F (36.5 °C)  HR:  [] 54  Resp:  [16-17] 16  BP: ()/(54-97) 102/72  SpO2:  [93 %-98 %] 96 %  Body mass index is 24.28 kg/m².     Input and Output Summary (last 24 hours):     Intake/Output Summary (Last 24 hours) at 2024 0954  Last data filed at 2024 0930  Gross per 24 hour   Intake 480 ml   Output 910 ml   Net -430 ml       Physical Exam:   Physical Exam  Vitals reviewed.   Constitutional:       Comments: Pt is in no acute distress sitting in his hospital bed resting comfortably    HENT:      Head: Normocephalic.   Cardiovascular:      Rate and Rhythm: Normal rate and regular rhythm.   Pulmonary:      Effort: No respiratory distress.      Breath sounds: Normal breath sounds.   Musculoskeletal:      Right lower leg: No edema.      Left lower leg: No edema.   Skin:     General: Skin is warm and dry.   Neurological:      Mental Status: He is alert.   Psychiatric:         Mood and Affect: Mood normal.          Additional Data:     Labs:  Results from last 7 days   Lab Units  02/16/24  0506   WBC Thousand/uL 4.80   HEMOGLOBIN g/dL 14.4   HEMATOCRIT % 44.0   PLATELETS Thousands/uL 223   NEUTROS PCT % 71   LYMPHS PCT % 18   MONOS PCT % 9   EOS PCT % 2     Results from last 7 days   Lab Units 02/16/24  0506 02/13/24  1010 02/13/24  0046   SODIUM mmol/L 139   < > 139   POTASSIUM mmol/L 3.9   < > 3.9   CHLORIDE mmol/L 106   < > 106   CO2 mmol/L 26   < > 23   BUN mg/dL 18   < > 28*   CREATININE mg/dL 1.05   < > 1.30   ANION GAP mmol/L 7   < > 10   CALCIUM mg/dL 9.0   < > 9.8   ALBUMIN g/dL  --   --  4.5   TOTAL BILIRUBIN mg/dL  --   --  0.51   ALK PHOS U/L  --   --  52   ALT U/L  --   --  24   AST U/L  --   --  21   GLUCOSE RANDOM mg/dL 106   < > 89    < > = values in this interval not displayed.         Results from last 7 days   Lab Units 02/15/24  1206 02/15/24  0816   POC GLUCOSE mg/dl 104 87               Lines/Drains:  Invasive Devices       Peripheral Intravenous Line  Duration             Peripheral IV 02/13/24 Distal;Dorsal (posterior);Left Forearm 3 days    Peripheral IV 02/13/24 Left Antecubital 3 days              Drain  Duration             Ureteral Internal Stent Right ureter 6 Fr. 142 days                      Telemetry:  Telemetry Orders (From admission, onward)               24 Hour Telemetry Monitoring  Continuous x 24 Hours (Telem)        Question:  Reason for 24 Hour Telemetry  Answer:  Arrhythmias requiring acute medical intervention / PPM or ICD malfunction                     Telemetry Reviewed: Normal Sinus Rhythm currently, HR in the 60s  Indication for Continued Telemetry Use: Arrthymias requiring medical therapy             Imaging: No pertinent imaging reviewed.    Recent Cultures (last 7 days):         Last 24 Hours Medication List:   Current Facility-Administered Medications   Medication Dose Route Frequency Provider Last Rate    acetaminophen  650 mg Oral Q6H PRN Polo Sanders MD      aluminum-magnesium hydroxide-simethicone  30 mL Oral Q4H PRN Polo Sanders MD       cefazolin  1,000 mg Intravenous Once Billie Ledezma PA-C      heparin (porcine)  5,000 Units Subcutaneous Q8H [facility] Polo Sanders MD      ondansetron  4 mg Intravenous Q6H PRN Polo Sanders MD      pravastatin  40 mg Oral Daily With Dinner Polo Sanders MD          Today, Patient Was Seen By: Indira Landry PA-C    **Please Note: This note may have been constructed using a voice recognition system.**

## 2024-02-16 NOTE — ASSESSMENT & PLAN NOTE
Pt with soft pressures here, overall stabilized so far today   Lopressor, amlodipine and valsartan on hold   Monitor

## 2024-02-16 NOTE — PLAN OF CARE
Problem: PAIN - ADULT  Goal: Verbalizes/displays adequate comfort level or baseline comfort level  Description: Interventions:  - Encourage patient to monitor pain and request assistance  - Assess pain using appropriate pain scale  - Administer analgesics based on type and severity of pain and evaluate response  - Implement non-pharmacological measures as appropriate and evaluate response  - Consider cultural and social influences on pain and pain management  - Notify physician/advanced practitioner if interventions unsuccessful or patient reports new pain  2/16/2024 0449 by Nery Colon  Outcome: Progressing  2/16/2024 0435 by Nery Colon  Outcome: Progressing     Problem: INFECTION - ADULT  Goal: Absence or prevention of progression during hospitalization  Description: INTERVENTIONS:  - Assess and monitor for signs and symptoms of infection  - Monitor lab/diagnostic results  - Monitor all insertion sites, i.e. indwelling lines, tubes, and drains  - Monitor endotracheal if appropriate and nasal secretions for changes in amount and color  - Markesan appropriate cooling/warming therapies per order  - Administer medications as ordered  - Instruct and encourage patient and family to use good hand hygiene technique  - Identify and instruct in appropriate isolation precautions for identified infection/condition  2/16/2024 0449 by Nery Colon  Outcome: Progressing  2/16/2024 0435 by Nery Colon  Outcome: Progressing  Goal: Absence of fever/infection during neutropenic period  Description: INTERVENTIONS:  - Monitor WBC    2/16/2024 0449 by Nery Colon  Outcome: Progressing  2/16/2024 0435 by Nery Colon  Outcome: Progressing     Problem: SAFETY ADULT  Goal: Patient will remain free of falls  Description: INTERVENTIONS:  - Educate patient/family on patient safety including physical limitations  - Instruct patient to call for assistance with activity   - Consult OT/PT to assist with  strengthening/mobility   - Keep Call bell within reach  - Keep bed low and locked with side rails adjusted as appropriate  - Keep care items and personal belongings within reach  - Initiate and maintain comfort rounds  - Make Fall Risk Sign visible to staff  - Apply yellow socks and bracelet for high fall risk patients  - Consider moving patient to room near nurses station  2/16/2024 0449 by Nery Colon  Outcome: Progressing  2/16/2024 0435 by Nery Colon  Outcome: Progressing  Goal: Maintain or return to baseline ADL function  Description: INTERVENTIONS:  -  Assess patient's ability to carry out ADLs; assess patient's baseline for ADL function and identify physical deficits which impact ability to perform ADLs (bathing, care of mouth/teeth, toileting, grooming, dressing, etc.)  - Assess/evaluate cause of self-care deficits   - Assess range of motion  - Assess patient's mobility; develop plan if impaired  - Assess patient's need for assistive devices and provide as appropriate  - Encourage maximum independence but intervene and supervise when necessary  - Involve family in performance of ADLs  - Assess for home care needs following discharge   - Consider OT consult to assist with ADL evaluation and planning for discharge  - Provide patient education as appropriate  2/16/2024 0449 by Nery Colon  Outcome: Progressing  2/16/2024 0435 by Nery Colon  Outcome: Progressing  Goal: Maintains/Returns to pre admission functional level  Description: INTERVENTIONS:  - Perform AM-PAC 6 Click Basic Mobility/ Daily Activity assessment daily.  - Set and communicate daily mobility goal to care team and patient/family/caregiver.   - Collaborate with rehabilitation services on mobility goals if consulted  - Out of bed for toileting  - Record patient progress and toleration of activity level   2/16/2024 0449 by Nery Colon  Outcome: Progressing  2/16/2024 0435 by Nery Colon  Outcome: Progressing      Problem: DISCHARGE PLANNING  Goal: Discharge to home or other facility with appropriate resources  Description: INTERVENTIONS:  - Identify barriers to discharge w/patient and caregiver  - Arrange for needed discharge resources and transportation as appropriate  - Identify discharge learning needs (meds, wound care, etc.)  - Arrange for interpretive services to assist at discharge as needed  - Refer to Case Management Department for coordinating discharge planning if the patient needs post-hospital services based on physician/advanced practitioner order or complex needs related to functional status, cognitive ability, or social support system  2/16/2024 0449 by Nery Colon  Outcome: Progressing  2/16/2024 0435 by Nery Colon  Outcome: Progressing     Problem: Knowledge Deficit  Goal: Patient/family/caregiver demonstrates understanding of disease process, treatment plan, medications, and discharge instructions  Description: Complete learning assessment and assess knowledge base.  Interventions:  - Provide teaching at level of understanding  - Provide teaching via preferred learning methods  2/16/2024 0449 by Nery Colon  Outcome: Progressing  2/16/2024 0435 by Nery Colon  Outcome: Progressing

## 2024-02-16 NOTE — DISCHARGE INSTR - AVS FIRST PAGE
NEW MEDICATIONS:  No new medications    RESTRICTIONS:   No heavy lifting or strenuous activity for one week.    Do not submerge in any water for one week or until groin heals. You may shower. Please let soap and water run over the groins. Do not scrub the groins. Pat the area dry. You may place band-aid on groins daily for no more than 5 days.    If you notice bleeding, swelling, or large firm lumps near ablation incision, please contact Dr. Vargas's office at [phone].

## 2024-02-17 ENCOUNTER — TELEPHONE (OUTPATIENT)
Dept: CARDIOLOGY CLINIC | Facility: CLINIC | Age: 59
End: 2024-02-17

## 2024-02-17 VITALS
WEIGHT: 164.4 LBS | SYSTOLIC BLOOD PRESSURE: 117 MMHG | TEMPERATURE: 97.9 F | HEIGHT: 69 IN | DIASTOLIC BLOOD PRESSURE: 88 MMHG | RESPIRATION RATE: 16 BRPM | OXYGEN SATURATION: 96 % | HEART RATE: 85 BPM | BODY MASS INDEX: 24.35 KG/M2

## 2024-02-17 PROBLEM — Z98.890 S/P RF ABLATION OPERATION FOR ARRHYTHMIA: Status: ACTIVE | Noted: 2024-02-17

## 2024-02-17 PROBLEM — Z86.79 S/P RF ABLATION OPERATION FOR ARRHYTHMIA: Status: ACTIVE | Noted: 2024-02-17

## 2024-02-17 LAB
ANION GAP SERPL CALCULATED.3IONS-SCNC: 6 MMOL/L
BUN SERPL-MCNC: 20 MG/DL (ref 5–25)
CALCIUM SERPL-MCNC: 9.4 MG/DL (ref 8.4–10.2)
CHLORIDE SERPL-SCNC: 110 MMOL/L (ref 96–108)
CO2 SERPL-SCNC: 25 MMOL/L (ref 21–32)
CREAT SERPL-MCNC: 0.93 MG/DL (ref 0.6–1.3)
ERYTHROCYTE [DISTWIDTH] IN BLOOD BY AUTOMATED COUNT: 13.9 % (ref 11.6–15.1)
GFR SERPL CREATININE-BSD FRML MDRD: 90 ML/MIN/1.73SQ M
GLUCOSE SERPL-MCNC: 103 MG/DL (ref 65–140)
HCT VFR BLD AUTO: 45.8 % (ref 36.5–49.3)
HGB BLD-MCNC: 14.8 G/DL (ref 12–17)
MCH RBC QN AUTO: 30.6 PG (ref 26.8–34.3)
MCHC RBC AUTO-ENTMCNC: 32.3 G/DL (ref 31.4–37.4)
MCV RBC AUTO: 95 FL (ref 82–98)
PLATELET # BLD AUTO: 204 THOUSANDS/UL (ref 149–390)
PMV BLD AUTO: 10.5 FL (ref 8.9–12.7)
POTASSIUM SERPL-SCNC: 4.1 MMOL/L (ref 3.5–5.3)
RBC # BLD AUTO: 4.84 MILLION/UL (ref 3.88–5.62)
SODIUM SERPL-SCNC: 141 MMOL/L (ref 135–147)
WBC # BLD AUTO: 5.64 THOUSAND/UL (ref 4.31–10.16)

## 2024-02-17 PROCEDURE — 85027 COMPLETE CBC AUTOMATED: CPT | Performed by: PHYSICIAN ASSISTANT

## 2024-02-17 PROCEDURE — 93005 ELECTROCARDIOGRAM TRACING: CPT

## 2024-02-17 PROCEDURE — 99232 SBSQ HOSP IP/OBS MODERATE 35: CPT | Performed by: INTERNAL MEDICINE

## 2024-02-17 PROCEDURE — 99239 HOSP IP/OBS DSCHRG MGMT >30: CPT | Performed by: INTERNAL MEDICINE

## 2024-02-17 PROCEDURE — 80048 BASIC METABOLIC PNL TOTAL CA: CPT | Performed by: PHYSICIAN ASSISTANT

## 2024-02-17 RX ORDER — PANTOPRAZOLE SODIUM 40 MG/1
40 TABLET, DELAYED RELEASE ORAL DAILY
Qty: 30 TABLET | Refills: 0 | Status: SHIPPED | OUTPATIENT
Start: 2024-02-18

## 2024-02-17 RX ADMIN — ACETAMINOPHEN 650 MG: 325 TABLET, FILM COATED ORAL at 04:08

## 2024-02-17 RX ADMIN — ACETAMINOPHEN 650 MG: 325 TABLET, FILM COATED ORAL at 09:02

## 2024-02-17 RX ADMIN — PANTOPRAZOLE SODIUM 40 MG: 40 TABLET, DELAYED RELEASE ORAL at 09:02

## 2024-02-17 NOTE — DISCHARGE SUMMARY
Discharge Summary - [facility]  Patient: Markie Robert 58 y.o. male   MRN: [id number]  PCP: Xenia Aly DO  Unit/Bed#: [facility]9-01 Encounter: [id number]            Discharging Physician / Practitioner: Chastity Dinero MD  PCP: Xenia Aly DO  Admission Date:   Admission Orders (From admission, onward)       Ordered        02/14/24 2112  Inpatient Admission  Once                          Discharge Date: 02/17/24      Reason for Admission:  Dizziness/lightheadedness with chest discomfort      Discharge Diagnoses:     Principal Problem:    Paroxysmal atrial tachycardia    Active Problems:    HLD (hyperlipidemia)    Essential hypertension    Sinus pause      Consultations During Hospital Stay:  Electrophysiology      Hospital Course:     Markie Robert is a 58 y.o. male patient who originally presented to the hospital on 2/14/2024 as a transfer from the [facility] where he initially presented with complaints of dizziness/lightheadedness and intermittent chest comfort.  Telemetry monitoring revealed evidence of SVT initially, with suspected paroxysmal atrial tachycardia, and he was subsequently transferred here to the [facility] for electrophysiology studies.  Ultimately, he underwent AT ablation on 2/16, with subsequent maintenance of normal sinus rhythm.  His groin sites were deemed stable with removal of sutures, and he was subsequently cleared for discharge by electrophysiology, with plan for outpatient follow-up.  Recommendation was also made for an outpatient stress test in the upcoming weeks, in case Flecainide would be required for use in the future.  He will continue his statin for hyperlipidemia.  In regards to history of hypertension, his antihypertensives were discontinued due to the above.  He will follow-up with cardiology, and his PCP, as indicated.       Condition at Discharge: fair       Discharge Day Visit / Exam:     Vitals:  Blood Pressure: 117/88 (02/17/24  "1114)  Pulse: 85 (02/17/24 1114)  Temperature: 97.9 °F (36.6 °C) (02/17/24 1114)  Temp Source: Oral (02/16/24 1919)  Respirations: 16 (02/16/24 1919)  Height: 5' 9\" (175.3 cm) (02/14/24 2121)  Weight - Scale: 74.6 kg (164 lb 6.4 oz) (02/14/24 2121)  SpO2: 96 % (02/17/24 1114)      Physical exam:  I had a face-to-face encounter with the patient on day of discharge.      Discussion with Patient and/or Family:  The patient has been advised to return to the ER immediately if any symptoms recur or worsen.       Discharge instructions/Information to Patient and/or Family:   See after visit summary for information provided to patient and/or family.        Provisions for Follow-Up Care:  See after visit summary for information related to follow-up care and any pertinent home health orders.        Disposition:   Home      Discharge Medications:  See after visit summary for reconciled discharge medications provided to patient and/or family.        Discharge Statement:  I spent 38 minutes discharging the patient. This time was spent on the day of discharge. I had direct contact with the patient on the day of discharge. Greater than 50% of the total time was spent examining patient, answering all patient questions, arranging and discussing plan of care with patient as well as directly providing post-discharge instructions.  Additional time then spent on discharge activities.           CRISTO BARROW MD   Hospitalist - [facility]        ** Please Note: This note is constructed using a voice recognition dictation system.  An occasional wrong word/phrase or “sound-a-like” substitution may have been picked up by dictation device due to the inherent limitations of voice recognition software.  Read the chart carefully and recognize, using reasonable context, where substitutions may have occurred.**       "

## 2024-02-17 NOTE — TELEPHONE ENCOUNTER
----- Message from [place] Shoemaker Min sent at 2/16/2024  2:16 PM EST -----  Edwin Ambriz,    Do we need prior auth for a 30-day Zio AT for this patient?    Thank you.  ----- Message -----  From: Maggie Holland PA-C  Sent: 2/16/2024   2:05 PM EST  To: Cardiology Ep Clincal    Can we run this krish for a 30 day live zio? Probably would get it on Monday.

## 2024-02-17 NOTE — PROGRESS NOTES
"Progress Note - Electrophysiology - Cardiology  Markie Robert 58 y.o. male MRN: [id number]  Unit/Bed#: -01 Encounter: [id number]      Assessment:  Paroxysmal atrial tachycardia s/p EP study and AT ablation 2/16/2024 by Dr. Vargas  Conversion pauses <2 second from PAT to SR  H/o sinus bradycardia, blocked PACs  HTN  HLD  LVEF 56%    Plan:  -- maintaining sinus rhythm on telemetry  -- groin sites stable - groin sutures removed bilaterally  -- no lifting more than 10 pounds for 2 weeks  -- ok to return to work on 3/4/2024 - [facility] paperwork completed  -- no specific cardiac meds required  -- outpatient stress test ordered in case flecainide used in future  -- stable for D/C home today from EP standpoint    Subjective/Objective   Subjective: Markie Robert is a 58 year old male with h/o HTN, HLD, EF 56% and paroxysmal atrial tachycardia who was transferred from [facility] for EP evaluation. He underwent EP study and AT ablation on 2/16/2024 by Dr. Vargas.     Today, he is feeling well. He denies chest pain, SOB, lightheadedness, dizziness, orthopnea, or PND. He has mild soreness at the groin sites. He would like to go home.     TELE: sinus rhythm HR 60 bpm    EKG 2/16/2024: normal sinus rhythm HR 70 bpm      Objective:  Vitals: /98   Pulse 78   Temp 98 °F (36.7 °C)   Resp 16   Ht 5' 9\" (1.753 m)   Wt 74.6 kg (164 lb 6.4 oz)   SpO2 98%   BMI 24.28 kg/m²     Vitals:    02/14/24 2100 02/14/24 2121   Weight: 74.6 kg (164 lb 6.4 oz) 74.6 kg (164 lb 6.4 oz)     Orthostatic Blood Pressures      Flowsheet Row Most Recent Value   Blood Pressure 138/98 filed at 02/17/2024 0731   Patient Position - Orthostatic VS Lying filed at 02/16/2024 1919              Intake/Output Summary (Last 24 hours) at 2/17/2024 0815  Last data filed at 2/16/2024 2100  Gross per 24 hour   Intake 1220 ml   Output 750 ml   Net 470 ml       Invasive Devices       Peripheral Intravenous Line  Duration             Peripheral IV 02/13/24 Distal;Dorsal " (posterior);Left Forearm 4 days    Peripheral IV 02/13/24 Left Antecubital 4 days              Drain  Duration             Ureteral Internal Stent Right ureter 6 Fr. 143 days                    Scheduled Meds:  Current Facility-Administered Medications   Medication Dose Route Frequency Provider Last Rate    [Transfer Hold] acetaminophen  650 mg Oral Q6H PRN Polo Sanders MD      acetaminophen  650 mg Oral Q4H PRN Tung Benjamin PA-C      [Transfer Hold] aluminum-magnesium hydroxide-simethicone  30 mL Oral Q4H PRN Polo Sanders MD      cefazolin  2,000 mg Intravenous Once Maggie Holland PA-C      [Transfer Hold] heparin (porcine)  5,000 Units Subcutaneous Q8H [facility] Polo Sanders MD      [Transfer Hold] ondansetron  4 mg Intravenous Q6H PRN Polo Sanders MD      pantoprazole  40 mg Oral Daily Tung Benjamin PA-C      [Transfer Hold] pravastatin  40 mg Oral Daily With Dinner Polo Sanders MD       Continuous Infusions:   PRN Meds:.  [Transfer Hold] acetaminophen    acetaminophen    [Transfer Hold] aluminum-magnesium hydroxide-simethicone    [Transfer Hold] ondansetron    Review of Systems:  Review of Systems   Constitutional: Negative.   HENT: Negative.     Eyes: Negative.    Cardiovascular: Negative.    Respiratory: Negative.     Endocrine: Negative.    Hematologic/Lymphatic: Negative.    Skin: Negative.    Musculoskeletal: Negative.    Gastrointestinal: Negative.    Genitourinary: Negative.    Neurological: Negative.    Psychiatric/Behavioral: Negative.       ROS as noted above, otherwise 12 point review of systems was performed and is negative.     Physical Exam:   GEN: NAD, alert and oriented x 3, well appearing  SKIN: warm, dry without significant lesions or rashes  HEENT: NCAT  NECK: supple, no JVD appreciated  CARDIOVASCULAR: RRR, normal S1, S2 without murmurs, rubs, or gallops   LUNGS: CTA bilaterally without wheezes, rhonchi, or rales  ABDOMEN: Soft, nontender, nondistended. Groins soft with no  bleeding, bruits or hematoma bilaterally. Sutures removed bilaterally   EXTREMITIES/VASCULAR: perfused without clubbing, cyanosis, or LE edema b/l  PSYCH: Normal mood and affect  NEURO: CN ll-Xll grossly intact    Lab Results: I have personally reviewed pertinent lab results.    Results from last 7 days   Lab Units 02/17/24  0434 02/16/24  0506 02/15/24  0149   WBC Thousand/uL 5.64 4.80 5.54   HEMOGLOBIN g/dL 14.8 14.4 15.9   HEMATOCRIT % 45.8 44.0 47.9   PLATELETS Thousands/uL 204 223 245     Results from last 7 days   Lab Units 02/17/24  0434 02/16/24  0506 02/15/24  0149   POTASSIUM mmol/L 4.1 3.9 4.0   CHLORIDE mmol/L 110* 106 106   CO2 mmol/L 25 26 25   BUN mg/dL 20 18 20   CREATININE mg/dL 0.93 1.05 0.98   CALCIUM mg/dL 9.4 9.0 9.2         Results from last 7 days   Lab Units 02/15/24  0149 02/13/24  0101   MAGNESIUM mg/dL 2.1 2.3       Imaging: I have personally reviewed pertinent reports.    No results found for this or any previous visit.

## 2024-02-17 NOTE — TELEPHONE ENCOUNTER
Kyle/Deepika pending tracking # [id number] for 30-day LIVE [id number].  Dr. Vargas ordering.  Clinical uploaded.

## 2024-02-18 LAB
ATRIAL RATE: 75 BPM
ATRIAL RATE: 76 BPM
P AXIS: 58 DEGREES
P AXIS: 59 DEGREES
PR INTERVAL: 148 MS
PR INTERVAL: 150 MS
QRS AXIS: 33 DEGREES
QRS AXIS: 35 DEGREES
QRSD INTERVAL: 90 MS
QRSD INTERVAL: 90 MS
QT INTERVAL: 376 MS
QT INTERVAL: 384 MS
QTC INTERVAL: 419 MS
QTC INTERVAL: 432 MS
T WAVE AXIS: 11 DEGREES
T WAVE AXIS: 13 DEGREES
VENTRICULAR RATE: 75 BPM
VENTRICULAR RATE: 76 BPM

## 2024-02-18 PROCEDURE — 93010 ELECTROCARDIOGRAM REPORT: CPT | Performed by: INTERNAL MEDICINE

## 2024-02-19 ENCOUNTER — TELEPHONE (OUTPATIENT)
Dept: FAMILY MEDICINE CLINIC | Facility: CLINIC | Age: 59
End: 2024-02-19

## 2024-02-19 ENCOUNTER — TRANSITIONAL CARE MANAGEMENT (OUTPATIENT)
Dept: FAMILY MEDICINE CLINIC | Facility: CLINIC | Age: 59
End: 2024-02-19

## 2024-02-19 NOTE — TELEPHONE ENCOUNTER
Per hospital note, this is discontinued for now. I will leave it up to cardiology to restart these medications unless I see a need for it when I evaluate him on Friday. Thanks.

## 2024-02-19 NOTE — TELEPHONE ENCOUNTER
Pt scheduled TCM for Friday, he is asking if he should restart his BP medication.    Please advise [phone]

## 2024-02-20 ENCOUNTER — NURSE TRIAGE (OUTPATIENT)
Dept: OTHER | Facility: OTHER | Age: 59
End: 2024-02-20

## 2024-02-20 ENCOUNTER — TELEPHONE (OUTPATIENT)
Dept: CARDIOLOGY CLINIC | Facility: CLINIC | Age: 59
End: 2024-02-20

## 2024-02-20 DIAGNOSIS — I48.0 PAROXYSMAL ATRIAL FIBRILLATION (HCC): Primary | ICD-10-CM

## 2024-02-20 LAB
ATRIAL RATE: 76 BPM
P AXIS: 35 DEGREES
PR INTERVAL: 152 MS
QRS AXIS: 38 DEGREES
QRSD INTERVAL: 90 MS
QT INTERVAL: 376 MS
QTC INTERVAL: 423 MS
T WAVE AXIS: 39 DEGREES
VENTRICULAR RATE: 76 BPM

## 2024-02-20 PROCEDURE — 93010 ELECTROCARDIOGRAM REPORT: CPT | Performed by: INTERNAL MEDICINE

## 2024-02-20 NOTE — TELEPHONE ENCOUNTER
"Patient called s/p SVT ablation 2/16/24.  He stated you mentioned you wanted him to have a sleep study.    Do you want order placed for just sleep study or sleep study consult and have sleep doc order sleep study?    Also, patient's BP was elevated and having headaches, stated BP was \"kenzie high.\"  On-Call doc restarted amlodipine at 5mg daily.  His BP today was 129/93 and headaches have subsided.  I told him to keep checking his BP and let us know if numbers do not remain stable.  " Attending with

## 2024-02-20 NOTE — TELEPHONE ENCOUNTER
"Regarding: Post Op Issue  ----- Message from Queenie Melo sent at 2/20/2024  6:16 AM EST -----  \"I had surgery on the 12th and they took me off of my blood pressure meds now my blood pressure is up and given me headaches. Can I start taking my meds again?\"    "

## 2024-02-20 NOTE — TELEPHONE ENCOUNTER
On call provider paged. Per on call provider, please restart Norvasc 5 mg. Pt verbalized understanding.

## 2024-02-20 NOTE — TELEPHONE ENCOUNTER
"Reason for Disposition  • Ran out of BP medications    Additional Information  • Symptom is main concern  (e.g., headache, chest pain)    Answer Assessment - Initial Assessment Questions  1. BLOOD PRESSURE: \"What is the blood pressure?\" \"Did you take at least two measurements 5 minutes apart?\"      2/20 6AM 134/99      2/19 6AM 137/96         2. ONSET: \"When did you take your blood pressure?\"      6AM    3. HOW: \"How did you obtain the blood pressure?\" (e.g., visiting nurse, automatic home BP monitor)      Automatic home BP monitor (arm)    4. HISTORY: \"Do you have a history of high blood pressure?\"      Yes    5. MEDICATIONS: \"Are you taking any medications for blood pressure?\" \"Have you missed any doses recently?\"      No longer on blood pressure medications since ablation on 2/12 per MD    6. OTHER SYMPTOMS: \"Do you have any symptoms?\" (e.g., headache, chest pain, blurred vision, difficulty breathing, weakness)      Headaches    Protocols used: High Blood Pressure-ADULT-AH    "

## 2024-02-21 PROBLEM — Z13.1 SCREENING FOR DIABETES MELLITUS: Status: RESOLVED | Noted: 2018-07-03 | Resolved: 2024-02-21

## 2024-02-21 PROBLEM — Z13.29 THYROID DISORDER SCREEN: Status: RESOLVED | Noted: 2018-07-03 | Resolved: 2024-02-21

## 2024-02-21 PROBLEM — Z00.00 WELL ADULT EXAM: Status: RESOLVED | Noted: 2018-07-03 | Resolved: 2024-02-21

## 2024-02-21 PROBLEM — Z13.0 SCREENING FOR DEFICIENCY ANEMIA: Status: RESOLVED | Noted: 2018-07-03 | Resolved: 2024-02-21

## 2024-02-21 PROBLEM — Z12.5 SCREENING FOR PROSTATE CANCER: Status: RESOLVED | Noted: 2018-07-03 | Resolved: 2024-02-21

## 2024-02-21 PROBLEM — Z13.6 SCREENING FOR CARDIOVASCULAR CONDITION: Status: RESOLVED | Noted: 2018-07-03 | Resolved: 2024-02-21

## 2024-02-21 PROBLEM — Z12.11 SCREEN FOR COLON CANCER: Status: RESOLVED | Noted: 2018-07-03 | Resolved: 2024-02-21

## 2024-02-23 ENCOUNTER — OFFICE VISIT (OUTPATIENT)
Dept: FAMILY MEDICINE CLINIC | Facility: CLINIC | Age: 59
End: 2024-02-23
Payer: COMMERCIAL

## 2024-02-23 VITALS
RESPIRATION RATE: 18 BRPM | TEMPERATURE: 97.8 F | DIASTOLIC BLOOD PRESSURE: 82 MMHG | BODY MASS INDEX: 25.1 KG/M2 | SYSTOLIC BLOOD PRESSURE: 120 MMHG | OXYGEN SATURATION: 98 % | HEART RATE: 78 BPM | WEIGHT: 170 LBS

## 2024-02-23 DIAGNOSIS — I47.19 PAROXYSMAL ATRIAL TACHYCARDIA: Primary | ICD-10-CM

## 2024-02-23 PROCEDURE — 99496 TRANSJ CARE MGMT HIGH F2F 7D: CPT

## 2024-02-23 RX ORDER — AMLODIPINE BESYLATE 5 MG/1
5 TABLET ORAL DAILY
COMMUNITY

## 2024-02-23 RX ORDER — VALSARTAN 80 MG/1
80 TABLET ORAL DAILY
COMMUNITY

## 2024-02-23 NOTE — PROGRESS NOTES
Assessment & Plan     1. Paroxysmal atrial tachycardia  Assessment & Plan:  Patient presents today for a TCM visit. He was recently hospitalized for paroxysmal atrial tachycardia, and received an ablation in the hospital. Since his discharge, he has not had any recurrence of his symptoms that led up to his hospital stay. He went back on both of his antihypertensives, and has had normal blood pressures at home. Blood pressure also in normal range today in clinic.     Patient has a cardiology appointment in March, along with a stress test scheduled. Encouraged follow up with cardiology for continued management.    Filled out paperwork for him to return to work on 3/4. Patient is unsure if he needs a functional capacity paperwork. He states he will ask his work if this is required, and will get back to us if I need to place a referral for it. Instructed patient that he may return to work without restrictions, however if any symptoms recur, he needs to let either myself or his cardiologist know, as there may need to be some restrictions in place such as heavy lifting restrictions.     Patient aware of ER precautions if his dizziness, lightheadedness, chest pain, or shortness of breath occurs.     Patient is understanding of the plan today and will follow up with me in 6 months for his annual physical, or sooner with any concerns.            Subjective     Transitional Care Management Review:   Markie Robert is a 58 y.o. male here for TCM follow up.     During the TCM phone call patient stated:  TCM Call       Date and time call was made  2/19/2024  9:32 AM    Hospital care reviewed  Records reviewed    Patient was hospitialized at  [facility]    Date of Admission  02/14/24    Date of discharge  02/17/24    Diagnosis  Paroxysmal atrial tachycardia    Disposition  Home    Were the patients medications reviewed and updated  No    Current Symptoms  None          TCM Call       Should patient be enrolled in  "anticoag monitoring?  No    Scheduled for follow up?  Yes    Patients specialists  Cardiologist    Did you obtain your prescribed medications  Yes    Do you need help managing your prescriptions or medications  No    Is transportation to your appointment needed  No    I have advised the patient to call PCP with any new or worsening symptoms  Alla Carrillo RN BSN          Patient presents today for a transition of care visit. Patient was hospitalized from 2/13-2/17 for paroxysmal atrial tachycardia. Hospital course is as follows:     \"Markie Robert is a 58 y.o. male patient who originally presented to the hospital on 2/14/2024 as a transfer from the [facility] where he initially presented with complaints of dizziness/lightheadedness and intermittent chest comfort.  Telemetry monitoring revealed evidence of SVT initially, with suspected paroxysmal atrial tachycardia, and he was subsequently transferred here to the [facility] for electrophysiology studies.  Ultimately, he underwent AT ablation on 2/16, with subsequent maintenance of normal sinus rhythm.  His groin sites were deemed stable with removal of sutures, and he was subsequently cleared for discharge by electrophysiology, with plan for outpatient follow-up.  Recommendation was also made for an outpatient stress test in the upcoming weeks, in case Flecainide would be required for use in the future.  He will continue his statin for hyperlipidemia.  In regards to history of hypertension, his antihypertensives were discontinued due to the above.  He will follow-up with cardiology, and his PCP, as indicated.\"    Patient states he has had no further symptoms since hospital discharge. He denies chest pain, palpitations, shortness of breath, lightheadedness, and dizziness. He has been keeping track of his blood pressure at home, which was originally high after the hospital. He was told to go back on his amlodipine 5mg, but was still having high pressures at " home. He then started his valsartan 60mg, which brought his blood pressure down into normal range. He is currently running 120-130s/70s at home. He states he needs a return to work note filled out today. He is planning on returning on 3/4.         Review of Systems   Constitutional:  Negative for chills, fatigue and fever.   Respiratory:  Negative for cough, chest tightness and shortness of breath.    Cardiovascular:  Negative for chest pain, palpitations and leg swelling.   Gastrointestinal:  Negative for abdominal pain.   Neurological:  Negative for dizziness, light-headedness and headaches.   Psychiatric/Behavioral:  Negative for behavioral problems. The patient is not nervous/anxious.        Objective     /82 (BP Location: Left arm, Patient Position: Sitting, Cuff Size: Standard)   Pulse 78   Temp 97.8 °F (36.6 °C) (Temporal)   Resp 18   Wt 77.1 kg (170 lb)   SpO2 98%   BMI 25.10 kg/m²      Physical Exam  Vitals and nursing note reviewed.   Constitutional:       General: He is not in acute distress.     Appearance: Normal appearance. He is not ill-appearing.   HENT:      Head: Normocephalic and atraumatic.   Cardiovascular:      Rate and Rhythm: Normal rate and regular rhythm.      Pulses: Normal pulses.      Heart sounds: No murmur heard.  Pulmonary:      Effort: Pulmonary effort is normal. No respiratory distress.      Breath sounds: Normal breath sounds. No stridor. No wheezing, rhonchi or rales.   Musculoskeletal:      Right lower leg: No edema.      Left lower leg: No edema.   Skin:     General: Skin is warm and dry.      Coloration: Skin is not cyanotic or pale.   Neurological:      General: No focal deficit present.      Mental Status: He is alert and oriented to person, place, and time. Mental status is at baseline.   Psychiatric:         Mood and Affect: Mood normal.         Behavior: Behavior normal.         Judgment: Judgment normal.       Medications have been reviewed by provider in  current encounter    Oneida Garza PA-C

## 2024-02-23 NOTE — ASSESSMENT & PLAN NOTE
Patient presents today for a TCM visit. He was recently hospitalized for paroxysmal atrial tachycardia, and received an ablation in the hospital. Since his discharge, he has not had any recurrence of his symptoms that led up to his hospital stay. He went back on both of his antihypertensives, and has had normal blood pressures at home. Blood pressure also in normal range today in clinic.     Patient has a cardiology appointment in March, along with a stress test scheduled. Encouraged follow up with cardiology for continued management.    Filled out paperwork for him to return to work on 3/4. Patient is unsure if he needs a functional capacity paperwork. He states he will ask his work if this is required, and will get back to us if I need to place a referral for it. Instructed patient that he may return to work without restrictions, however if any symptoms recur, he needs to let either myself or his cardiologist know, as there may need to be some restrictions in place such as heavy lifting restrictions.     Patient aware of ER precautions if his dizziness, lightheadedness, chest pain, or shortness of breath occurs.     Patient is understanding of the plan today and will follow up with me in 6 months for his annual physical, or sooner with any concerns.

## 2024-02-28 NOTE — TELEPHONE ENCOUNTER
Spoke with patient.  His BP this morning at 11 /81.  Monday and Tuesday around 124/91.  He is currently only taking Valsartan 80mg daily which he went back on 2/22/24, so only about one week.  He states he is not taking the amlodipine. When he checks his BP first thing in the AM it is around 129/95.     Do you want to make any further medication changes?      He states he is feeling well except for occasional strong heart beats which can last for 15 minutes.  Sometimes HR can be increased up to 102 bpm or low in the 50's when he feels this.  States these symptoms are similar to what he experienced prior to ablation.  I did explain that it is not unusual for 90 days post ablation while the heart is healing.  He is asking if you want him to wear a monitor?     Also placed referral for sleep medicine.

## 2024-02-28 NOTE — TELEPHONE ENCOUNTER
Called and spoke with patient re: BP's:    Monday 2/26/24     average 124/91  Tuesday 2/27/24    Today 2/28/24 @ 11 AM    110/81    States that upon waking BP averages 129/95.       Do you want to make any further medication changes?       He states he is feeling well except for occasional strong heart beats which can last for 15 minutes.  Sometimes HR can be increased up to 102 bpm or low in the 50's when he feels this.  States these symptoms are similar to what he experienced prior to ablation.  I did explain that it is not unusual for 90 days post ablation while the heart is healing.  He is asking if you want him to wear a monitor?      Also placed referral for sleep medicine.

## 2024-03-01 NOTE — PROGRESS NOTES
Progress Note - Electrophysiology-Cardiology (EP)   Markie Robert 58 y.o. male MRN: [id number]  Unit/Bed#:  Encounter: [id number]           1. SVT (supraventricular tachycardia)  POCT ECG      2. Hypertension, unspecified type        3. Nephrolithiasis        4. Hyperlipidemia, unspecified hyperlipidemia type        5. s/p atrial tachycardia ablation 2/16/2024        6. Paroxysmal atrial tachycardia               Summary of my recommendation for the patient  SVT, atrial tachycardia, post ablation-patient doing well without any significant recurrence    Set up with cardiology in [place]  Will see me only as needed        Clinical conditions  SVT  Atrial tachycardia  Hypertension  Hyperlipidemia        Assessment/Plan     SVT  Atrial tachycardia  Patient doing well post atrial tachycardia ablation  No further recurrence        Hypertension  Blood pressure today is 110/78  He has however had systolic blood pressures above 140 and diastolic blood pressure above 90  Increase valsartan to 80 mg 2 times daily  Continue with amlodipine 5 mg daily  May need further titration as an outpatient    Patient is being set up with general cardiology in [place]        Hyperlipidemia  On simvastatin  No myositis or myalgia        Snoring, morning fatigue and daytime sleepiness  Getting evaluated with a sleep study        History of Present Illness   HPI: Markie Robert is a 58 y.o. year old male has been referred to me by Dr FAGAN for evaluation and management of SVT      The patient has significant medical illnesses which include  SVT  Atrial tachycardia  Hypertension  Hyperlipidemia    Patient has undergone atrial tachycardia ablation, and is doing well    Patient is not complaining of anginal-like chest pain or pressure, orthopnea, PND, leg swelling, palpitation, presyncope or syncope      He does have hypertension  When his diastolic blood pressure is more than 100 he has headache    He does not use tobacco alcohol or energy  drinks    He does have a history of snoring, morning fatigue and daytime sleepiness        Historical Information   Past Medical History:   Diagnosis Date    Hyperlipidemia     Hypertension     s/p atrial tachycardia ablation 2/16/2024 02/17/2024     Past Surgical History:   Procedure Laterality Date    CARDIAC ELECTROPHYSIOLOGY PROCEDURE N/A 2/16/2024    Procedure: Cardiac eps/svt ablation;  Surgeon: Minor Vargas MD;  Location:  CARDIAC CATH LAB;  Service: Cardiology    HEMORRHOID SURGERY      HERNIA REPAIR      MT COLONOSCOPY FLX DX W/COLLJ SPEC WHEN PFRMD N/A 10/2/2018    Procedure: COLONOSCOPY;  Surgeon: Mo St MD;  Location: [facility];  Service: Gastroenterology    MT CYSTO/URETERO W/LITHOTRIPSY &INDWELL STENT INSRT Right 9/26/2023    Procedure: CYSTOSCOPY URETEROSCOPY WITH LITHOTRIPSY HOLMIUM LASER, AND INSERTION STENT URETERAL;  Surgeon: Shad Kenyon MD;  Location: [facility] OR;  Service: Urology     Social History     Substance and Sexual Activity   Alcohol Use Yes    Comment: 1 or 2 drinks once or twice a month     Social History     Substance and Sexual Activity   Drug Use No     Social History     Tobacco Use   Smoking Status Never   Smokeless Tobacco Never     Social History     Socioeconomic History    Marital status: /Civil Union     Spouse name: Not on file    Number of children: Not on file    Years of education: Not on file    Highest education level: Not on file   Occupational History    Not on file   Tobacco Use    Smoking status: Never    Smokeless tobacco: Never   Vaping Use    Vaping status: Never Used   Substance and Sexual Activity    Alcohol use: Yes     Comment: 1 or 2 drinks once or twice a month    Drug use: No    Sexual activity: Yes     Partners: Female     Birth control/protection: OCP   Other Topics Concern    Not on file   Social History Narrative    Not on file     Social Determinants of Health     Financial Resource Strain: Not on file   Food Insecurity: No Food  "Insecurity (2/15/2024)    Hunger Vital Sign     Worried About Running Out of Food in the Last Year: Never true     Ran Out of Food in the Last Year: Never true   Transportation Needs: No Transportation Needs (2/15/2024)    PRAPARE - Transportation     Lack of Transportation (Medical): No     Lack of Transportation (Non-Medical): No   Physical Activity: Not on file   Stress: Not on file   Social Connections: Not on file   Intimate Partner Violence: Not on file   Housing Stability: Low Risk  (2/15/2024)    Housing Stability Vital Sign     Unable to Pay for Housing in the Last Year: No     Number of Places Lived in the Last Year: 1     Unstable Housing in the Last Year: No     .  Family History:  Family History   Problem Relation Age of Onset    Hypertension Mother     Hyperlipidemia Mother     Hyperlipidemia Father     No Known Problems Brother          Meds/Allergies      No current facility-administered medications for this visit.        (Not in a hospital admission)      No Known Allergies        Objective   Vitals: Visit Vitals  /78 (BP Location: Right arm, Patient Position: Sitting, Cuff Size: Standard)   Pulse 65   Ht 5' 9\" (1.753 m)   Wt 77.8 kg (171 lb 8 oz)   BMI 25.33 kg/m²   Smoking Status Never   BSA 1.94 m²        Invasive Devices       Peripheral Intravenous Line  Duration             Peripheral IV 02/13/24 Distal;Dorsal (posterior);Left Forearm 21 days    Peripheral IV 02/13/24 Left Antecubital 21 days              Drain  Duration             Ureteral Internal Stent Right ureter 6 Fr. 160 days                      ROS  Review of Systems   All other systems reviewed and are negative.  As described in my history of present illness        PHYSICAL EXAM  Physical Exam  Vitals reviewed.   Constitutional:       General: He is not in acute distress.     Appearance: Normal appearance. He is normal weight. He is not ill-appearing.   HENT:      Head: Normocephalic and atraumatic.      Right Ear: External " ear normal.      Left Ear: External ear normal.      Nose: Nose normal.      Mouth/Throat:      Mouth: Mucous membranes are moist.   Eyes:      General: No scleral icterus.     Extraocular Movements: Extraocular movements intact.      Conjunctiva/sclera: Conjunctivae normal.      Pupils: Pupils are equal, round, and reactive to light.   Cardiovascular:      Rate and Rhythm: Normal rate and regular rhythm.      Heart sounds: Normal heart sounds. No murmur heard.  Pulmonary:      Effort: No respiratory distress.      Breath sounds: Normal breath sounds. No wheezing or rales.   Abdominal:      General: Bowel sounds are normal. There is no distension.      Palpations: Abdomen is soft.      Tenderness: There is no abdominal tenderness. There is no guarding.   Musculoskeletal:         General: No swelling or deformity.      Cervical back: Neck supple. No rigidity.   Skin:     Coloration: Skin is not jaundiced.      Findings: No bruising or lesion.   Neurological:      Mental Status: He is oriented to person, place, and time. Mental status is at baseline.      Motor: No weakness.   Psychiatric:         Mood and Affect: Mood normal.         Behavior: Behavior normal.         Thought Content: Thought content normal.         Judgment: Judgment normal.               LAB RESULTS:    CBC:  WBC   Date Value Ref Range Status   02/17/2024 5.64 4.31 - 10.16 Thousand/uL Final     Hemoglobin   Date Value Ref Range Status   02/17/2024 14.8 12.0 - 17.0 g/dL Final     Hematocrit   Date Value Ref Range Status   02/17/2024 45.8 36.5 - 49.3 % Final     MCV   Date Value Ref Range Status   02/17/2024 95 82 - 98 fL Final     Platelets   Date Value Ref Range Status   02/17/2024 204 149 - 390 Thousands/uL Final     RBC   Date Value Ref Range Status   02/17/2024 4.84 3.88 - 5.62 Million/uL Final     MCH   Date Value Ref Range Status   02/17/2024 30.6 26.8 - 34.3 pg Final     MCHC   Date Value Ref Range Status   02/17/2024 32.3 31.4 - 37.4 g/dL  "Final     RDW   Date Value Ref Range Status   02/17/2024 13.9 11.6 - 15.1 % Final     MPV   Date Value Ref Range Status   02/17/2024 10.5 8.9 - 12.7 fL Final     nRBC   Date Value Ref Range Status   02/16/2024 0 /100 WBCs Final       CMP:  Potassium   Date Value Ref Range Status   02/17/2024 4.1 3.5 - 5.3 mmol/L Final   07/05/2022 4.0 3.5 - 5.3 mmol/L Final     Chloride   Date Value Ref Range Status   02/17/2024 110 (H) 96 - 108 mmol/L Final   07/05/2022 107 98 - 110 mmol/L Final     CO2   Date Value Ref Range Status   02/17/2024 25 21 - 32 mmol/L Final   07/05/2022 27 20 - 32 mmol/L Final     BUN   Date Value Ref Range Status   02/17/2024 20 5 - 25 mg/dL Final   07/05/2022 19 7 - 25 mg/dL Final     Creatinine   Date Value Ref Range Status   02/17/2024 0.93 0.60 - 1.30 mg/dL Final     Comment:     Standardized to IDMS reference method     Calcium   Date Value Ref Range Status   02/17/2024 9.4 8.4 - 10.2 mg/dL Final   07/05/2022 10.2 8.6 - 10.3 mg/dL Final     AST   Date Value Ref Range Status   02/13/2024 21 13 - 39 U/L Final   07/05/2022 22 10 - 35 U/L Final     ALT   Date Value Ref Range Status   02/13/2024 24 7 - 52 U/L Final     Comment:     Specimen collection should occur prior to Sulfasalazine administration due to the potential for falsely depressed results.    07/05/2022 25 9 - 46 U/L Final     Alkaline Phosphatase   Date Value Ref Range Status   02/13/2024 52 34 - 104 U/L Final   07/05/2022 63 35 - 144 U/L Final     eGFR   Date Value Ref Range Status   02/17/2024 90 ml/min/1.73sq m Final        Magnesium:   Magnesium   Date Value Ref Range Status   02/15/2024 2.1 1.9 - 2.7 mg/dL Final        A1C:  No results found for: \"HGBA1C\"     TSH:  TSH 3RD GENERATON   Date Value Ref Range Status   02/13/2024 4.014 0.450 - 4.500 uIU/mL Final     Comment:     Adult TSH (3rd generation) reference range follows the recommended guidelines of the American Thyroid Association, January, 2020.        PT/INR:  No results found " "for: \"PROTIME\", \"INR\"    Lipid Panel:  Total Cholesterol   Date Value Ref Range Status   2022 160 <200 mg/dL Final     Triglycerides   Date Value Ref Range Status   2022 107 <150 mg/dL Final     HDL   Date Value Ref Range Status   2022 55 > OR = 40 mg/dL Final       Troponin:  No results found for: \"TROPONINI\"      Imaging:    [id number]        FELICITAS:  No results found for this or any previous visit.      Echocardiogram:  Results for orders placed during the hospital encounter of 24    Echo complete w/ contrast if indicated    Interpretation Summary    Left Ventricle: Left ventricular cavity size is normal. Wall thickness is normal. The left ventricular ejection fraction is 56% by biplane measurement. Systolic function is normal. Wall motion is normal.    Aortic Valve: The aortic valve is trileaflet.  There is aortic sclerosis.    Mitral Valve: There is mild regurgitation.    Tricuspid Valve: There is mild regurgitation. The right ventricular systolic pressure is normal. The estimated right ventricular systolic pressure is 13.00 mmHg.    Aorta: The aortic root is normal in size. The ascending aorta is mildly dilated. The aortic root is 3.40 cm. The ascending aorta is 4 cm.      Stress Test:   No results found for this or any previous visit.      Cardiac Catheterization:  No results found for this or any previous visit.      HOLTER MONITOR: 24 HOUR/48 HOUR MONITORS  No results found for this or any previous visit.      AMB Extended Holter Monitor: Zio XT/AT or BioTel  No results found for this or any previous visit.      Chest CTA:    CTA ed Chest pe Study  2024    FINDINGS:     PULMONARY ARTERIAL TREE: No pulmonary embolus is seen.     LUNGS: There is a 3 mm right middle lobe nodule on series 3 image 162, unchanged from 2023. There is a stable 4 mm left lower lobe nodule on series 2 image 197 there is no tracheal or endobronchial lesion.     PLEURA: Unremarkable.   "   HEART/GREAT VESSELS: Unremarkable for patient's age. No thoracic aortic aneurysm.     MEDIASTINUM AND MANDO: Unremarkable.     CHEST WALL AND LOWER NECK: Unremarkable.     VISUALIZED STRUCTURES IN THE UPPER ABDOMEN: There is a 3 mm nonobstructing left renal calculus. There is a 2 mm nonobstructing right renal calculus.     OSSEOUS STRUCTURES: No acute fracture or destructive osseous lesion.     IMPRESSION:     1.  No acute pulmonary embolism.     2.  Small lung nodules, stable from September 2023. Based on current Fleischner Society 2017 Guidelines on incidental pulmonary nodule, no routine follow-up is needed if the patient is low risk. If the patient is high risk, optional follow-up chest CT at   12 months can be considered.     3.  Bilateral nonobstructing renal calculi     Major findings are consistent with the preliminary report from [facility] which was provided shortly after completion of the exam. Given the additional finding of lung nodules, the study was marked in EPIC for immediate notification.         DEVICE CHECK:     No results found for this or any previous visit.         Code Status: [unfilled]  Advance Directive and Living Will:      Power of :    POLST:      Counseling / Coordination of Care  Detailed discussion with the patient with regards to follow-up of atrial tachycardia  Patient is going to get evaluated for sleep apnea      Minor Vargas MD

## 2024-03-05 ENCOUNTER — OFFICE VISIT (OUTPATIENT)
Dept: CARDIOLOGY CLINIC | Facility: CLINIC | Age: 59
End: 2024-03-05
Payer: COMMERCIAL

## 2024-03-05 VITALS
WEIGHT: 171.5 LBS | DIASTOLIC BLOOD PRESSURE: 78 MMHG | HEART RATE: 65 BPM | HEIGHT: 69 IN | BODY MASS INDEX: 25.4 KG/M2 | SYSTOLIC BLOOD PRESSURE: 110 MMHG

## 2024-03-05 DIAGNOSIS — I10 HYPERTENSION, UNSPECIFIED TYPE: ICD-10-CM

## 2024-03-05 DIAGNOSIS — E78.5 HYPERLIPIDEMIA, UNSPECIFIED HYPERLIPIDEMIA TYPE: ICD-10-CM

## 2024-03-05 DIAGNOSIS — N20.0 NEPHROLITHIASIS: ICD-10-CM

## 2024-03-05 DIAGNOSIS — Z86.79 S/P RF ABLATION OPERATION FOR ARRHYTHMIA: ICD-10-CM

## 2024-03-05 DIAGNOSIS — I47.19 PAROXYSMAL ATRIAL TACHYCARDIA: ICD-10-CM

## 2024-03-05 DIAGNOSIS — I47.10 SVT (SUPRAVENTRICULAR TACHYCARDIA): Primary | ICD-10-CM

## 2024-03-05 DIAGNOSIS — Z98.890 S/P RF ABLATION OPERATION FOR ARRHYTHMIA: ICD-10-CM

## 2024-03-05 PROCEDURE — 99214 OFFICE O/P EST MOD 30 MIN: CPT | Performed by: INTERNAL MEDICINE

## 2024-03-05 PROCEDURE — 93000 ELECTROCARDIOGRAM COMPLETE: CPT | Performed by: INTERNAL MEDICINE

## 2024-03-05 RX ORDER — VALSARTAN 80 MG/1
80 TABLET ORAL 2 TIMES DAILY
Qty: 180 TABLET | Refills: 3 | Status: SHIPPED | OUTPATIENT
Start: 2024-03-05

## 2024-03-05 NOTE — LETTER
March 5, 2024     Oneida Garza PA-C  [address]   [address]  [facility] [address]    Patient: Markie Robert   YOB: 1965   Date of Visit: 3/5/2024       Dear Dr. Garza:    Thank you for referring Markie Robert to me for evaluation. Below are my notes for this consultation.    If you have questions, please do not hesitate to call me. I look forward to following your patient along with you.         Sincerely,        Minor Vargas MD        CC: DO Minor Bingham MD  3/5/2024 10:40 AM  Sign when Signing Visit  Progress Note - Electrophysiology-Cardiology (EP)   Markie Robert 58 y.o. male MRN: [id number]  Unit/Bed#:  Encounter: [id number]           1. SVT (supraventricular tachycardia)  POCT ECG      2. Hypertension, unspecified type        3. Nephrolithiasis        4. Hyperlipidemia, unspecified hyperlipidemia type        5. s/p atrial tachycardia ablation 2/16/2024        6. Paroxysmal atrial tachycardia               Summary of my recommendation for the patient  SVT, atrial tachycardia, post ablation-patient doing well without any significant recurrence    Set up with cardiology in [place]  Will see me only as needed        Clinical conditions  SVT  Atrial tachycardia  Hypertension  Hyperlipidemia        Assessment/Plan    SVT  Atrial tachycardia  Patient doing well post atrial tachycardia ablation  No further recurrence        Hypertension  Blood pressure today is 110/78  He has however had systolic blood pressures above 140 and diastolic blood pressure above 90  Increase valsartan to 80 mg 2 times daily  Continue with amlodipine 5 mg daily  May need further titration as an outpatient    Patient is being set up with general cardiology in [place]        Hyperlipidemia  On simvastatin  No myositis or myalgia        Snoring, morning fatigue and daytime sleepiness  Getting evaluated with a sleep study        History of Present Illness  HPI: Markie Robert is a  58 y.o. year old male has been referred to me by Dr FAGAN for evaluation and management of SVT      The patient has significant medical illnesses which include  SVT  Atrial tachycardia  Hypertension  Hyperlipidemia    Patient has undergone atrial tachycardia ablation, and is doing well    Patient is not complaining of anginal-like chest pain or pressure, orthopnea, PND, leg swelling, palpitation, presyncope or syncope      He does have hypertension  When his diastolic blood pressure is more than 100 he has headache    He does not use tobacco alcohol or energy drinks    He does have a history of snoring, morning fatigue and daytime sleepiness        Historical Information  Past Medical History:   Diagnosis Date   • Hyperlipidemia    • Hypertension    • s/p atrial tachycardia ablation 2/16/2024 02/17/2024     Past Surgical History:   Procedure Laterality Date   • CARDIAC ELECTROPHYSIOLOGY PROCEDURE N/A 2/16/2024    Procedure: Cardiac eps/svt ablation;  Surgeon: Minor Vargas MD;  Location:  CARDIAC CATH LAB;  Service: Cardiology   • HEMORRHOID SURGERY     • HERNIA REPAIR     • NE COLONOSCOPY FLX DX W/COLLJ SPEC WHEN PFRMD N/A 10/2/2018    Procedure: COLONOSCOPY;  Surgeon: Mo St MD;  Location: [facility];  Service: Gastroenterology   • NE CYSTO/URETERO W/LITHOTRIPSY &INDWELL STENT INSRT Right 9/26/2023    Procedure: CYSTOSCOPY URETEROSCOPY WITH LITHOTRIPSY HOLMIUM LASER, AND INSERTION STENT URETERAL;  Surgeon: Shad Kenyon MD;  Location: [facility] OR;  Service: Urology     Social History     Substance and Sexual Activity   Alcohol Use Yes    Comment: 1 or 2 drinks once or twice a month     Social History     Substance and Sexual Activity   Drug Use No     Social History     Tobacco Use   Smoking Status Never   Smokeless Tobacco Never     Social History     Socioeconomic History   • Marital status: /Civil Union     Spouse name: Not on file   • Number of children: Not on file   • Years of education: Not  "on file   • Highest education level: Not on file   Occupational History   • Not on file   Tobacco Use   • Smoking status: Never   • Smokeless tobacco: Never   Vaping Use   • Vaping status: Never Used   Substance and Sexual Activity   • Alcohol use: Yes     Comment: 1 or 2 drinks once or twice a month   • Drug use: No   • Sexual activity: Yes     Partners: Female     Birth control/protection: OCP   Other Topics Concern   • Not on file   Social History Narrative   • Not on file     Social Determinants of Health     Financial Resource Strain: Not on file   Food Insecurity: No Food Insecurity (2/15/2024)    Hunger Vital Sign    • Worried About Running Out of Food in the Last Year: Never true    • Ran Out of Food in the Last Year: Never true   Transportation Needs: No Transportation Needs (2/15/2024)    PRAPARE - Transportation    • Lack of Transportation (Medical): No    • Lack of Transportation (Non-Medical): No   Physical Activity: Not on file   Stress: Not on file   Social Connections: Not on file   Intimate Partner Violence: Not on file   Housing Stability: Low Risk  (2/15/2024)    Housing Stability Vital Sign    • Unable to Pay for Housing in the Last Year: No    • Number of Places Lived in the Last Year: 1    • Unstable Housing in the Last Year: No     .  Family History:  Family History   Problem Relation Age of Onset   • Hypertension Mother    • Hyperlipidemia Mother    • Hyperlipidemia Father    • No Known Problems Brother          Meds/Allergies     No current facility-administered medications for this visit.        (Not in a hospital admission)      No Known Allergies        Objective  Vitals: Visit Vitals  /78 (BP Location: Right arm, Patient Position: Sitting, Cuff Size: Standard)   Pulse 65   Ht 5' 9\" (1.753 m)   Wt 77.8 kg (171 lb 8 oz)   BMI 25.33 kg/m²   Smoking Status Never   BSA 1.94 m²        Invasive Devices       Peripheral Intravenous Line  Duration             Peripheral IV 02/13/24 " Distal;Dorsal (posterior);Left Forearm 21 days    Peripheral IV 02/13/24 Left Antecubital 21 days              Drain  Duration             Ureteral Internal Stent Right ureter 6 Fr. 160 days                      ROS  Review of Systems   All other systems reviewed and are negative.  As described in my history of present illness        PHYSICAL EXAM  Physical Exam  Vitals reviewed.   Constitutional:       General: He is not in acute distress.     Appearance: Normal appearance. He is normal weight. He is not ill-appearing.   HENT:      Head: Normocephalic and atraumatic.      Right Ear: External ear normal.      Left Ear: External ear normal.      Nose: Nose normal.      Mouth/Throat:      Mouth: Mucous membranes are moist.   Eyes:      General: No scleral icterus.     Extraocular Movements: Extraocular movements intact.      Conjunctiva/sclera: Conjunctivae normal.      Pupils: Pupils are equal, round, and reactive to light.   Cardiovascular:      Rate and Rhythm: Normal rate and regular rhythm.      Heart sounds: Normal heart sounds. No murmur heard.  Pulmonary:      Effort: No respiratory distress.      Breath sounds: Normal breath sounds. No wheezing or rales.   Abdominal:      General: Bowel sounds are normal. There is no distension.      Palpations: Abdomen is soft.      Tenderness: There is no abdominal tenderness. There is no guarding.   Musculoskeletal:         General: No swelling or deformity.      Cervical back: Neck supple. No rigidity.   Skin:     Coloration: Skin is not jaundiced.      Findings: No bruising or lesion.   Neurological:      Mental Status: He is oriented to person, place, and time. Mental status is at baseline.      Motor: No weakness.   Psychiatric:         Mood and Affect: Mood normal.         Behavior: Behavior normal.         Thought Content: Thought content normal.         Judgment: Judgment normal.               LAB RESULTS:    CBC:  WBC   Date Value Ref Range Status   02/17/2024 5.64  4.31 - 10.16 Thousand/uL Final     Hemoglobin   Date Value Ref Range Status   02/17/2024 14.8 12.0 - 17.0 g/dL Final     Hematocrit   Date Value Ref Range Status   02/17/2024 45.8 36.5 - 49.3 % Final     MCV   Date Value Ref Range Status   02/17/2024 95 82 - 98 fL Final     Platelets   Date Value Ref Range Status   02/17/2024 204 149 - 390 Thousands/uL Final     RBC   Date Value Ref Range Status   02/17/2024 4.84 3.88 - 5.62 Million/uL Final     MCH   Date Value Ref Range Status   02/17/2024 30.6 26.8 - 34.3 pg Final     MCHC   Date Value Ref Range Status   02/17/2024 32.3 31.4 - 37.4 g/dL Final     RDW   Date Value Ref Range Status   02/17/2024 13.9 11.6 - 15.1 % Final     MPV   Date Value Ref Range Status   02/17/2024 10.5 8.9 - 12.7 fL Final     nRBC   Date Value Ref Range Status   02/16/2024 0 /100 WBCs Final       CMP:  Potassium   Date Value Ref Range Status   02/17/2024 4.1 3.5 - 5.3 mmol/L Final   07/05/2022 4.0 3.5 - 5.3 mmol/L Final     Chloride   Date Value Ref Range Status   02/17/2024 110 (H) 96 - 108 mmol/L Final   07/05/2022 107 98 - 110 mmol/L Final     CO2   Date Value Ref Range Status   02/17/2024 25 21 - 32 mmol/L Final   07/05/2022 27 20 - 32 mmol/L Final     BUN   Date Value Ref Range Status   02/17/2024 20 5 - 25 mg/dL Final   07/05/2022 19 7 - 25 mg/dL Final     Creatinine   Date Value Ref Range Status   02/17/2024 0.93 0.60 - 1.30 mg/dL Final     Comment:     Standardized to IDMS reference method     Calcium   Date Value Ref Range Status   02/17/2024 9.4 8.4 - 10.2 mg/dL Final   07/05/2022 10.2 8.6 - 10.3 mg/dL Final     AST   Date Value Ref Range Status   02/13/2024 21 13 - 39 U/L Final   07/05/2022 22 10 - 35 U/L Final     ALT   Date Value Ref Range Status   02/13/2024 24 7 - 52 U/L Final     Comment:     Specimen collection should occur prior to Sulfasalazine administration due to the potential for falsely depressed results.    07/05/2022 25 9 - 46 U/L Final     Alkaline Phosphatase   Date  "Value Ref Range Status   2024 52 34 - 104 U/L Final   2022 63 35 - 144 U/L Final     eGFR   Date Value Ref Range Status   2024 90 ml/min/1.73sq m Final        Magnesium:   Magnesium   Date Value Ref Range Status   02/15/2024 2.1 1.9 - 2.7 mg/dL Final        A1C:  No results found for: \"HGBA1C\"     TSH:  TSH 3RD GENERATON   Date Value Ref Range Status   2024 4.014 0.450 - 4.500 uIU/mL Final     Comment:     Adult TSH (3rd generation) reference range follows the recommended guidelines of the American Thyroid Association, .        PT/INR:  No results found for: \"PROTIME\", \"INR\"    Lipid Panel:  Total Cholesterol   Date Value Ref Range Status   2022 160 <200 mg/dL Final     Triglycerides   Date Value Ref Range Status   2022 107 <150 mg/dL Final     HDL   Date Value Ref Range Status   2022 55 > OR = 40 mg/dL Final       Troponin:  No results found for: \"TROPONINI\"      Imaging:    [id number]        FELICITAS:  No results found for this or any previous visit.      Echocardiogram:  Results for orders placed during the hospital encounter of 24    Echo complete w/ contrast if indicated    Interpretation Summary  •  Left Ventricle: Left ventricular cavity size is normal. Wall thickness is normal. The left ventricular ejection fraction is 56% by biplane measurement. Systolic function is normal. Wall motion is normal.  •  Aortic Valve: The aortic valve is trileaflet.  There is aortic sclerosis.  •  Mitral Valve: There is mild regurgitation.  •  Tricuspid Valve: There is mild regurgitation. The right ventricular systolic pressure is normal. The estimated right ventricular systolic pressure is 13.00 mmHg.  •  Aorta: The aortic root is normal in size. The ascending aorta is mildly dilated. The aortic root is 3.40 cm. The ascending aorta is 4 cm.      Stress Test:   No results found for this or any previous visit.      Cardiac Catheterization:  No results found for this " or any previous visit.      HOLTER MONITOR: 24 HOUR/48 HOUR MONITORS  No results found for this or any previous visit.      AMB Extended Holter Monitor: Zio XT/AT or BioTel  No results found for this or any previous visit.      Chest CTA:    CTA ed Chest pe Study  02/13/2024    FINDINGS:     PULMONARY ARTERIAL TREE: No pulmonary embolus is seen.     LUNGS: There is a 3 mm right middle lobe nodule on series 3 image 162, unchanged from September 2023. There is a stable 4 mm left lower lobe nodule on series 2 image 197 there is no tracheal or endobronchial lesion.     PLEURA: Unremarkable.     HEART/GREAT VESSELS: Unremarkable for patient's age. No thoracic aortic aneurysm.     MEDIASTINUM AND MANDO: Unremarkable.     CHEST WALL AND LOWER NECK: Unremarkable.     VISUALIZED STRUCTURES IN THE UPPER ABDOMEN: There is a 3 mm nonobstructing left renal calculus. There is a 2 mm nonobstructing right renal calculus.     OSSEOUS STRUCTURES: No acute fracture or destructive osseous lesion.     IMPRESSION:     1.  No acute pulmonary embolism.     2.  Small lung nodules, stable from September 2023. Based on current Fleischner Society 2017 Guidelines on incidental pulmonary nodule, no routine follow-up is needed if the patient is low risk. If the patient is high risk, optional follow-up chest CT at   12 months can be considered.     3.  Bilateral nonobstructing renal calculi     Major findings are consistent with the preliminary report from [facility] which was provided shortly after completion of the exam. Given the additional finding of lung nodules, the study was marked in EPIC for immediate notification.         DEVICE CHECK:     No results found for this or any previous visit.         Code Status: [unfilled]  Advance Directive and Living Will:      Power of :    POLST:      Counseling / Coordination of Care  Detailed discussion with the patient with regards to follow-up of atrial tachycardia  Patient is  going to get evaluated for sleep apnea      Minor Vargas MD

## 2024-03-05 NOTE — PATIENT INSTRUCTIONS
- Increase Valsartan to 80 mg, twice daily; updated prescription sent to pharmacy    - Schedule appointment with [facility] in [place] for management of blood pressure and cholesterol; referral placed   - Call [phone] for appointment     **No follow up needed with electrophysiology unless something changes**

## 2024-03-07 ENCOUNTER — HOSPITAL ENCOUNTER (OUTPATIENT)
Dept: NON INVASIVE DIAGNOSTICS | Facility: HOSPITAL | Age: 59
Discharge: HOME | End: 2024-03-07
Payer: COMMERCIAL

## 2024-03-07 VITALS
BODY MASS INDEX: 25.33 KG/M2 | WEIGHT: 171 LBS | HEART RATE: 71 BPM | DIASTOLIC BLOOD PRESSURE: 80 MMHG | HEIGHT: 69 IN | SYSTOLIC BLOOD PRESSURE: 130 MMHG | OXYGEN SATURATION: 96 %

## 2024-03-07 DIAGNOSIS — Z86.79 S/P RF ABLATION OPERATION FOR ARRHYTHMIA: ICD-10-CM

## 2024-03-07 DIAGNOSIS — R00.1 SYMPTOMATIC BRADYCARDIA: ICD-10-CM

## 2024-03-07 DIAGNOSIS — Z98.890 S/P RF ABLATION OPERATION FOR ARRHYTHMIA: ICD-10-CM

## 2024-03-07 LAB
CHEST PAIN STATEMENT: NORMAL
MAX DIASTOLIC BP: 82 MMHG
MAX HR PERCENT: 93 %
MAX HR: 151 BPM
MAX PREDICTED HEART RATE: 162 BPM
PROTOCOL NAME: NORMAL
RATE PRESSURE PRODUCT: NORMAL
REASON FOR TERMINATION: NORMAL
SL CV STRESS RECOVERY BP: NORMAL MMHG
SL CV STRESS RECOVERY HR: 91 BPM
SL CV STRESS RECOVERY O2 SAT: 97 %
SL CV STRESS STAGE REACHED: 3
STRESS ANGINA INDEX: 0
STRESS BASELINE BP: NORMAL MMHG
STRESS BASELINE HR: 71 BPM
STRESS O2 SAT REST: 96 %
STRESS PEAK HR: 151 BPM
STRESS POST ESTIMATED WORKLOAD: 8.5 METS
STRESS POST EXERCISE DUR MIN: 7 MIN
STRESS POST EXERCISE DUR MIN: 7 MIN
STRESS POST EXERCISE DUR SEC: 0 SEC
STRESS POST EXERCISE DUR SEC: 0 SEC
STRESS POST O2 SAT PEAK: 94 %
STRESS POST PEAK BP: 168 MMHG
STRESS POST PEAK HR: 151 BPM
STRESS POST PEAK SYSTOLIC BP: 168 MMHG
TARGET HR FORMULA: NORMAL
TEST INDICATION: NORMAL

## 2024-03-07 PROCEDURE — 93017 CV STRESS TEST TRACING ONLY: CPT

## 2024-03-07 PROCEDURE — 93016 CV STRESS TEST SUPVJ ONLY: CPT

## 2024-03-07 PROCEDURE — 93018 CV STRESS TEST I&R ONLY: CPT

## 2024-03-26 ENCOUNTER — HOSPITAL ENCOUNTER (OUTPATIENT)
Dept: ULTRASOUND IMAGING | Facility: MEDICAL CENTER | Age: 59
Discharge: HOME | End: 2024-03-26
Attending: UROLOGY
Payer: COMMERCIAL

## 2024-03-26 DIAGNOSIS — Z98.890 STATUS POST LASER LITHOTRIPSY OF URETERAL CALCULUS: ICD-10-CM

## 2024-03-26 DIAGNOSIS — N20.0 NEPHROLITHIASIS: ICD-10-CM

## 2024-03-26 PROCEDURE — 76775 US EXAM ABDO BACK WALL LIM: CPT

## 2024-03-27 ENCOUNTER — RA CDI HCC (OUTPATIENT)
Dept: OTHER | Facility: HOSPITAL | Age: 59
End: 2024-03-27

## 2024-04-02 ENCOUNTER — OFFICE VISIT (OUTPATIENT)
Dept: UROLOGY | Facility: CLINIC | Age: 59
End: 2024-04-02
Payer: COMMERCIAL

## 2024-04-02 VITALS
HEIGHT: 69 IN | HEART RATE: 78 BPM | DIASTOLIC BLOOD PRESSURE: 80 MMHG | WEIGHT: 170 LBS | SYSTOLIC BLOOD PRESSURE: 130 MMHG | BODY MASS INDEX: 25.18 KG/M2

## 2024-04-02 DIAGNOSIS — N20.0 NEPHROLITHIASIS: Primary | ICD-10-CM

## 2024-04-02 PROCEDURE — 99213 OFFICE O/P EST LOW 20 MIN: CPT | Performed by: PHYSICIAN ASSISTANT

## 2024-04-02 NOTE — PROGRESS NOTES
4/2/2024      No chief complaint on file.        Assessment and Plan    58 y.o. male managed by Dr Manzano & Dr Kenyon    Nephrolithiasis    well treated right ureteral stone (acute encounter 9/23/23)  randalls plaques right renal  left renal calculi  aua stone dietary modifications reviewed today. goals  oz water intake per day specifically  discussed possibility of elective shockwave lithotripsy for left renal stones in future  check US/KUB 1 year    Orders Placed This Encounter   Procedures   • XR abdomen 1 view kub   • US kidney and bladder       History of Present Illness  Markie Robert is a 58 y.o. male here for evaluation of 3 month postop stone visit. ER visit September 2023 7mm right proximal ureteral stone and other renal stones. Saw Dr Manzano and scheduled for OR- he underwent cystoscopy RIGHT ureteroscopy laser lithotripsy of the ureteral stone and other small renal stones some other Julian's plaques on 9/26/23 with Dr Kenyon. Stent removed a week later. Here today with updated US showing good stone destruction on the right with small 3 and 2mm fragments and stable unchanged (no prior tx) 6 and 4mm left renal stones. He has no complaints voiding well no hematuria gravel passage no flank pain feels back to normal. that was his first ever stone episode. he is slightly concerned about the renal calculi on the left side which are asymptomatic right now. he cut out all iced tea.          Review of Systems   Constitutional: Negative.    Respiratory: Negative.     Cardiovascular: Negative.    Genitourinary:  Negative for decreased urine volume, difficulty urinating, dysuria, flank pain, frequency, hematuria and urgency.   Musculoskeletal: Negative.            AUA SYMPTOM SCORE      Flowsheet Row Most Recent Value   AUA SYMPTOM SCORE    How often have you had a sensation of not emptying your bladder completely after you finished urinating? 0 (P)    How often have you had to urinate again less than two  "hours after you finished urinating? 0 (P)    How often have you found you stopped and started again several times when you urinate? 0 (P)    How often have you found it difficult to postpone urination? 0 (P)    How often have you had a weak urinary stream? 4 (P)    How often have you had to push or strain to begin urination? 0 (P)    How many times did you most typically get up to urinate from the time you went to bed at night until the time you got up in the morning? 1 (P)    Quality of Life: If you were to spend the rest of your life with your urinary condition just the way it is now, how would you feel about that? 1 (P)    AUA SYMPTOM SCORE 5 (P)              Vitals  Vitals:    04/02/24 0750   BP: 130/80   Pulse: 78   Weight: 77.1 kg (170 lb)   Height: 5' 9\" (1.753 m)       Physical Exam  Vitals and nursing note reviewed.   Constitutional:       General: He is not in acute distress.     Appearance: Normal appearance. He is well-developed. He is not diaphoretic.   HENT:      Head: Normocephalic and atraumatic.   Pulmonary:      Effort: Pulmonary effort is normal.      Comments: No cough or audible wheeze  Abdominal:      General: There is no distension.      Tenderness: There is no abdominal tenderness. There is no right CVA tenderness or left CVA tenderness.   Musculoskeletal:      Right lower leg: No edema.      Left lower leg: No edema.   Skin:     General: Skin is warm and dry.   Neurological:      Mental Status: He is alert and oriented to person, place, and time.      Gait: Gait normal.   Psychiatric:         Speech: Speech normal.         Behavior: Behavior normal.           Past History  Past Medical History:   Diagnosis Date   • Hyperlipidemia    • Hypertension    • s/p atrial tachycardia ablation 2/16/2024 02/17/2024     Social History     Socioeconomic History   • Marital status: /Civil Union     Spouse name: None   • Number of children: None   • Years of education: None   • Highest education " level: None   Occupational History   • None   Tobacco Use   • Smoking status: Never   • Smokeless tobacco: Never   Vaping Use   • Vaping status: Never Used   Substance and Sexual Activity   • Alcohol use: Yes     Comment: 1 or 2 drinks once or twice a month   • Drug use: No   • Sexual activity: Yes     Partners: Female     Birth control/protection: OCP   Other Topics Concern   • None   Social History Narrative   • None     Social Determinants of Health     Financial Resource Strain: Not on file   Food Insecurity: No Food Insecurity (2/15/2024)    Hunger Vital Sign    • Worried About Running Out of Food in the Last Year: Never true    • Ran Out of Food in the Last Year: Never true   Transportation Needs: No Transportation Needs (2/15/2024)    PRAPARE - Transportation    • Lack of Transportation (Medical): No    • Lack of Transportation (Non-Medical): No   Physical Activity: Not on file   Stress: Not on file   Social Connections: Not on file   Intimate Partner Violence: Not on file   Housing Stability: Low Risk  (2/15/2024)    Housing Stability Vital Sign    • Unable to Pay for Housing in the Last Year: No    • Number of Places Lived in the Last Year: 1    • Unstable Housing in the Last Year: No     Social History     Tobacco Use   Smoking Status Never   Smokeless Tobacco Never     Family History   Problem Relation Age of Onset   • Hypertension Mother    • Hyperlipidemia Mother    • Hyperlipidemia Father    • No Known Problems Brother        The following portions of the patient's history were reviewed and updated as appropriate: allergies, current medications, past medical history, past social history, past surgical history and problem list.    Results  No results found for this or any previous visit (from the past 1 hour(s)).]  Lab Results   Component Value Date    PSA 0.3 12/09/2021    PSA 0.2 07/14/2018     Lab Results   Component Value Date    CALCIUM 9.4 02/17/2024    K 4.1 02/17/2024    CO2 25 02/17/2024    CL  110 (H) 02/17/2024    BUN 20 02/17/2024    CREATININE 0.93 02/17/2024     Lab Results   Component Value Date    WBC 5.64 02/17/2024    HGB 14.8 02/17/2024    HCT 45.8 02/17/2024    MCV 95 02/17/2024     02/17/2024

## 2024-04-21 DIAGNOSIS — E78.5 HYPERLIPIDEMIA, UNSPECIFIED HYPERLIPIDEMIA TYPE: ICD-10-CM

## 2024-04-22 RX ORDER — SIMVASTATIN 20 MG
TABLET ORAL
Qty: 90 TABLET | Refills: 0 | Status: SHIPPED | OUTPATIENT
Start: 2024-04-22

## 2024-05-02 ENCOUNTER — OFFICE VISIT (OUTPATIENT)
Dept: CARDIOLOGY CLINIC | Facility: CLINIC | Age: 59
End: 2024-05-02
Payer: COMMERCIAL

## 2024-05-02 VITALS
HEIGHT: 70 IN | WEIGHT: 161 LBS | BODY MASS INDEX: 23.05 KG/M2 | HEART RATE: 66 BPM | SYSTOLIC BLOOD PRESSURE: 120 MMHG | DIASTOLIC BLOOD PRESSURE: 80 MMHG

## 2024-05-02 DIAGNOSIS — I10 HYPERTENSION, UNSPECIFIED TYPE: ICD-10-CM

## 2024-05-02 DIAGNOSIS — I77.819 AORTIC DILATATION (HCC): Primary | ICD-10-CM

## 2024-05-02 PROCEDURE — 99214 OFFICE O/P EST MOD 30 MIN: CPT | Performed by: INTERNAL MEDICINE

## 2024-05-02 RX ORDER — VALSARTAN 80 MG/1
80 TABLET ORAL DAILY
Start: 2024-05-02

## 2024-05-17 ENCOUNTER — OFFICE VISIT (OUTPATIENT)
Dept: FAMILY MEDICINE CLINIC | Facility: CLINIC | Age: 59
End: 2024-05-17
Payer: COMMERCIAL

## 2024-05-17 VITALS
BODY MASS INDEX: 24.44 KG/M2 | TEMPERATURE: 97.5 F | SYSTOLIC BLOOD PRESSURE: 130 MMHG | OXYGEN SATURATION: 98 % | DIASTOLIC BLOOD PRESSURE: 88 MMHG | HEART RATE: 66 BPM | HEIGHT: 69 IN | WEIGHT: 165 LBS

## 2024-05-17 DIAGNOSIS — E78.5 HYPERLIPIDEMIA, UNSPECIFIED HYPERLIPIDEMIA TYPE: ICD-10-CM

## 2024-05-17 DIAGNOSIS — K40.20 BILATERAL INGUINAL HERNIA WITHOUT OBSTRUCTION OR GANGRENE, RECURRENCE NOT SPECIFIED: Primary | ICD-10-CM

## 2024-05-17 DIAGNOSIS — Z13.1 SCREENING FOR DIABETES MELLITUS: ICD-10-CM

## 2024-05-17 DIAGNOSIS — Z13.29 SCREENING FOR THYROID DISORDER: ICD-10-CM

## 2024-05-17 DIAGNOSIS — Z13.0 SCREENING FOR DEFICIENCY ANEMIA: ICD-10-CM

## 2024-05-17 DIAGNOSIS — K42.9 UMBILICAL HERNIA WITHOUT OBSTRUCTION AND WITHOUT GANGRENE: ICD-10-CM

## 2024-05-17 PROCEDURE — 99214 OFFICE O/P EST MOD 30 MIN: CPT

## 2024-05-17 NOTE — PROGRESS NOTES
Ambulatory Visit  Name: Markie Robert      : 1965      MRN: [id number]  Encounter Provider: Oneida Garza PA-C  Encounter Date: 2024   Encounter department: [facility]    Assessment & Plan   1. Bilateral inguinal hernia without obstruction or gangrene, recurrence not specified  Assessment & Plan:  Recommend groin U/S to r/o hernia, left sided mass felt on exam today. No mass on right side, however patient reports irritation there as well. Will evaluate bilaterally. No urinary symptoms at this time. No abdominal pain. Reports discomfort but no stabbing or shooting pain in the groin, incarceration very unlikely at this point. Given ER precautions of incarceration symptoms. Will recommend general surgery referral pending US results. Patient agreeable with this plan today. Recommend warm compresses in the groin to help with irritation.   Orders:  -     US groin/inguinal area; Future; Expected date: 2024  2. Umbilical hernia without obstruction and without gangrene  Assessment & Plan:  Umbilical hernia still present, patient believes it may be bigger than it once was. Occasionally has pain radiating down into left groin. May recommend general surgery referral once US results return.   3. Hyperlipidemia, unspecified hyperlipidemia type  Assessment & Plan:  Recheck lipid panel prior to next physical.   Orders:  -     Lipid Panel with Direct LDL reflex; Future; Expected date: 2024  -     Lipid Panel with Direct LDL reflex  4. Screening for deficiency anemia  -     CBC and differential; Future; Expected date: 2024  -     CBC and differential  5. Screening for diabetes mellitus  -     Comprehensive metabolic panel; Future; Expected date: 2024  -     Comprehensive metabolic panel  6. Screening for thyroid disorder  -     TSH, 3rd generation with Free T4 reflex; Future; Expected date: 2024  -     TSH, 3rd generation with Free T4 reflex       History of  "Present Illness     Patient presents today for evaluation of a possible hernia. He states both sides of his testicles feel irritated, no sharp or shooting pains. He has felt a mass on the left side of his groin, which was not there previously. Symptoms started about a week ago. He did recently have an ablation and the area of irritation is in the area where they went through. He has history of a hernia on the right inguinal area and umbilical hernia. He has no changes in bowel movements, abdominal pain, nausea, vomiting, or urinary symptoms.         Review of Systems   Constitutional:  Negative for chills, fatigue and fever.   Respiratory:  Negative for cough, chest tightness and shortness of breath.    Cardiovascular:  Negative for chest pain, palpitations and leg swelling.   Gastrointestinal:  Negative for abdominal pain, constipation, diarrhea, nausea and vomiting.   Genitourinary:  Positive for testicular pain.   Neurological:  Negative for dizziness, light-headedness and headaches.       Objective     /88 (BP Location: Left arm, Patient Position: Sitting, Cuff Size: Standard)   Pulse 66   Temp 97.5 °F (36.4 °C)   Ht 5' 9.29\" (1.76 m)   Wt 74.8 kg (165 lb)   SpO2 98%   BMI 24.16 kg/m²     Physical Exam  Constitutional:       General: He is not in acute distress.     Appearance: Normal appearance. He is not ill-appearing.   HENT:      Head: Normocephalic and atraumatic.   Pulmonary:      Effort: Pulmonary effort is normal. No respiratory distress.   Abdominal:      Hernia: A hernia is present. Hernia is present in the umbilical area and left inguinal area. There is no hernia in the right femoral area, left femoral area or right inguinal area.   Genitourinary:         Comments: Irritation felt in area noted above, Hx of hernia removal no mesh in the 90s. No mass felt.   Skin:     Coloration: Skin is not cyanotic or pale.   Neurological:      General: No focal deficit present.      Mental Status: He is " alert and oriented to person, place, and time.   Psychiatric:         Mood and Affect: Mood normal.         Behavior: Behavior normal.         Judgment: Judgment normal.       Administrative Statements     Oneida Garza PA-C  [facility]

## 2024-05-17 NOTE — ASSESSMENT & PLAN NOTE
Recommend groin U/S to r/o hernia, left sided mass felt on exam today. No mass on right side, however patient reports irritation there as well. Will evaluate bilaterally. No urinary symptoms at this time. No abdominal pain. Reports discomfort but no stabbing or shooting pain in the groin, incarceration very unlikely at this point. Given ER precautions of incarceration symptoms. Will recommend general surgery referral pending US results. Patient agreeable with this plan today. Recommend warm compresses in the groin to help with irritation.

## 2024-05-17 NOTE — ASSESSMENT & PLAN NOTE
Umbilical hernia still present, patient believes it may be bigger than it once was. Occasionally has pain radiating down into left groin. May recommend general surgery referral once US results return.

## 2024-05-24 ENCOUNTER — HOSPITAL ENCOUNTER (OUTPATIENT)
Dept: ULTRASOUND IMAGING | Facility: HOSPITAL | Age: 59
Discharge: HOME | End: 2024-05-24
Payer: COMMERCIAL

## 2024-05-24 DIAGNOSIS — K40.20 BILATERAL INGUINAL HERNIA WITHOUT OBSTRUCTION OR GANGRENE, RECURRENCE NOT SPECIFIED: ICD-10-CM

## 2024-05-24 PROCEDURE — 76705 ECHO EXAM OF ABDOMEN: CPT

## 2024-06-03 ENCOUNTER — OFFICE VISIT (OUTPATIENT)
Dept: SLEEP CENTER | Facility: CLINIC | Age: 59
End: 2024-06-03
Payer: COMMERCIAL

## 2024-06-03 VITALS
DIASTOLIC BLOOD PRESSURE: 90 MMHG | RESPIRATION RATE: 16 BRPM | HEIGHT: 69 IN | SYSTOLIC BLOOD PRESSURE: 108 MMHG | WEIGHT: 166 LBS | OXYGEN SATURATION: 98 % | HEART RATE: 72 BPM | BODY MASS INDEX: 24.59 KG/M2

## 2024-06-03 DIAGNOSIS — R06.83 SNORING: Primary | ICD-10-CM

## 2024-06-03 DIAGNOSIS — R06.81 WITNESSED EPISODE OF APNEA: ICD-10-CM

## 2024-06-03 DIAGNOSIS — I48.0 PAROXYSMAL ATRIAL FIBRILLATION (HCC): ICD-10-CM

## 2024-06-03 PROCEDURE — 99203 OFFICE O/P NEW LOW 30 MIN: CPT

## 2024-06-03 NOTE — PROGRESS NOTES
[facility]  Sleep Medicine New Patient Evaluation    PATIENT NAME: Markie Robert  DATE OF SERVICE: Giselle 3, 2024    CONSULTING PROVIDER:  Minor Vargas MD  [address] [place] [address]  [place] [address]    ASSESSMENT/PLAN:  Markie Robert is a 58 y.o. male with PMHx of HTN, SVT s/p ablation, HLD, GERD and h/o Lyme disease who presents for sleep evaluation.    Snoring  Witnessed Episode of Apnea  Paroxysmal Atrial Tachycarida  - The patient reports symptoms concerning for TAYE including loud snoring, witnessed episodes of apnea and waking unrefreshed.  STOP-BANG during today's visit was 5 and further testing is warranted.  Given his history of hypertension and arrhythmia would recommend treatment of any degree of sleep apnea.  - Will obtain HSAT and discussed with patient that should the HSAT be negative the recommendation would be for PSG.  - Discussed with the patient the possible diagnosis, causes and conditions associated with SDB along with potential treatments.  The patient is amenable to discussing results/treatment plan via phone/MyChart.  - Encouraged healthy lifestyle with adequate sleep (7-9 hours per night), healthy balanced diet and routine exercise.  - Follow-up to be determined based on results of sleep study.  -     Ambulatory Referral to Sleep Medicine  -     Home Study; Future    Thank you for allowing me to participate in the care of your patient.  If there are any questions regarding evaluation please feel free to reach out.   ________________________________________________________________________________________________    HPI: Markie Robert is a 58 y.o. male with PMHx of HTN, SVT s/p ablation, HLD, GERD and h/o Lyme disease who presents for sleep evaluation.  Sleep-Related History: No prior sleep studies or sleep consults.    The patient was referred by his cardiologist due to concern for sleep apnea after SVT ablation.  He reports that he has been concerned about having sleep  apnea in the past and his wife notes loud snoring and witnessed apneas.  He states at times he will wake up choking/gasping from sleep and often does not feel refreshed in the morning.  He frequently wakes up throughout the night due to nocturia, but denies daytime sleepiness.  He works at [employer], but does not have a CDL.  He denies symptoms consistent with SP, HH, cataplexy, insomnia, parasomnias and RLS.    ESS: Total score: 2/24  Greater or equal to 10 is positive for excessive daytime sleepiness  Pertinent Meds: None    Sleep-Wake Schedule:  He is self-described as having no morning/night preference.  Bedtime: 0100  Wake Time: 0800  Difficulty Falling Asleep: No  Avg Number of Awakenings: 1-3x  Cause of Awakenings: bathroom  Weekend Sleep Schedule: 2230/2300 - 0800  Naps: denies    Avg TST per 24 hours: 7-8 hours    Sleep-Related Details:  Preferred Sleep Position: side(s)  SDB Symptoms: snoring, witnessed apneas, gasping/choking, multiple awakenings, and waking unrefreshed  Bruxism: No  Nocturnal GERD: No  Nocturnal Palpitations: No  Nocturnal Anxiety or Rumination: No  Sleep-Related Hallucinations: No   Sleep Paralysis: No   Cataplexy: No     He denies having an urge to move the legs in the evening (when resting) nor uncomfortable and/or unpleasant sensations in the legs. He has not been told that he kicks his legs during sleep.     He denies any parasomnia activity.    Wake-Related Details  Daytime Sleepiness: more fatigue than true sleepiness during the day  Work Schedule: [employer], 2nd shift, 2925-2360 (no CDL)  Drowsy Driving: No  Caffeine: Yes, 1 cup of coffee when he gets to work  Alcohol Use: Yes, social use  Substance Use: No  Tobacco Use: No, denies vaping/e-cigs, cigars and chewing tobacco  Weight Change: stable recently    He does not have difficulty with memory or concentration.    Past Treatments:  None    Prior Sleep Studies:  None    Other Relevant Labs and Studies:  None    Past Medical  History:   Diagnosis Date    Hyperlipidemia     Hypertension     s/p atrial tachycardia ablation 2/16/2024 02/17/2024     Past Surgical History:   Procedure Laterality Date    CARDIAC ELECTROPHYSIOLOGY PROCEDURE N/A 2/16/2024    Procedure: Cardiac eps/svt ablation;  Surgeon: Minor Vargas MD;  Location:  CARDIAC CATH LAB;  Service: Cardiology    HEMORRHOID SURGERY      HERNIA REPAIR      OR COLONOSCOPY FLX DX W/COLLJ SPEC WHEN PFRMD N/A 10/2/2018    Procedure: COLONOSCOPY;  Surgeon: Mo St MD;  Location: [facility] GI [facility];  Service: Gastroenterology    OR CYSTO/URETERO W/LITHOTRIPSY &INDWELL STENT INSRT Right 9/26/2023    Procedure: CYSTOSCOPY URETEROSCOPY WITH LITHOTRIPSY HOLMIUM LASER, AND INSERTION STENT URETERAL;  Surgeon: Shad Kenyon MD;  Location: [facility] OR;  Service: Urology     Patient Active Problem List   Diagnosis    Umbilical hernia without obstruction and without gangrene    HLD (hyperlipidemia)    Grade II hemorrhoids    Dyspepsia    History of Lyme disease    Hypertension    Arthralgia    Insomnia    Nephrolithiasis    Paroxysmal atrial tachycardia    Sinus pause    s/p atrial tachycardia ablation 2/16/2024    Bilateral inguinal hernia without obstruction or gangrene     Allergies as of 06/03/2024    (No Known Allergies)     REVIEW OF SYSTEMS:  Review of Systems  10-point system review completed, all of which are negative except as mentioned above.    CURRENT MEDICATIONS:  Current Outpatient Medications   Medication Instructions    amLODIPine (NORVASC) 5 mg, Oral, Daily    simvastatin (ZOCOR) 20 mg tablet take one tablet (20mg total) by mouth daily at bedtime    valsartan (DIOVAN) 80 mg, Oral, Daily     SOCIAL HISTORY:  Social History     Tobacco Use    Smoking status: Never    Smokeless tobacco: Never   Vaping Use    Vaping status: Never Used   Substance Use Topics    Alcohol use: Yes     Comment: 1 or 2 drinks once or twice a month    Drug use: No     FAMILY HISTORY:  Family History   Problem  "Relation Age of Onset    Hypertension Mother     Hyperlipidemia Mother     Hyperlipidemia Father     No Known Problems Brother      Family History of Sleep Disorders: denies    PHYSICAL EXAMINATION:  Vital Signs:  /90   Pulse 72   Resp 16   Ht 5' 9.2\" (1.758 m)   Wt 75.3 kg (166 lb)   SpO2 98%   BMI 24.37 kg/m²   Body mass index is 24.37 kg/m².    Constitutional: NAD, well appearing   Mental Status: AAOx3  Neck Circumference: Neck Circumference: 14 inches  Skin: Warm, dry, no rashes noted   Eyes: PERRL, normal conjunctiva  ENT: Nasal congestion absent, nasal valve incompetence absent.  Posterior Airspace:   Hooper Tongue Position: 3  Retrognathia: absent  Overbite: absent  High Arched Palate: absent  Tongue Scalloping/Ridging: present  Tonsils: absent   Uvula: normal  Chest: RRR, +S1/S2, CTA B/L, no W/R/R, no M/R/G  Abdomen: Soft, NT/ND  Extremities: No digital clubbing or pedal edema    I have spent a total time of 40 minutes on 06/03/24 in caring for this patient including Instructions for management, Patient and family education, Importance of tx compliance, Counseling / Coordination of care, Documenting in the medical record, Reviewing / ordering tests, medicine, procedures  , and Obtaining or reviewing history  .    Indira Siddiqi MD  Pulmonary-Critical Care and Sleep Medicine  06/03/24    Portions of the record may have been created with voice recognition software. Occasional wrong word or \"sound a like\" substitutions may have occurred due to the inherent limitations of voice recognition software. Please read the chart carefully and recognize, using context, where substitutions have occurred.  "

## 2024-07-11 ENCOUNTER — HOSPITAL ENCOUNTER (OUTPATIENT)
Dept: SLEEP CENTER | Facility: CLINIC | Age: 59
Discharge: HOME | End: 2024-07-11
Payer: COMMERCIAL

## 2024-07-11 DIAGNOSIS — R06.83 SNORING: ICD-10-CM

## 2024-07-11 DIAGNOSIS — R06.81 WITNESSED EPISODE OF APNEA: ICD-10-CM

## 2024-07-11 PROCEDURE — G0399 HOME SLEEP TEST/TYPE 3 PORTA: HCPCS

## 2024-07-11 NOTE — PROGRESS NOTES
Home Sleep Study Documentation    HOME STUDY DEVICE: Noxturnal no                                           June G3 yes device # 7      Pre-Sleep Home Study:    Set-up and instructions performed by: Elvi    Technician performed demonstration for Patient: yes    Return demonstration performed by Patient: yes    Written instructions provided to Patient: yes    Patient signed consent form: yes        Post-Sleep Home Study:    Additional comments by Patient:       Home Sleep Study Failed:no:    Failure reason: N/A    Reported or Detected: N/A    Scored by: BECCA Sellers

## 2024-07-12 PROBLEM — G47.33 OSA (OBSTRUCTIVE SLEEP APNEA): Status: ACTIVE | Noted: 2024-07-12

## 2024-07-15 DIAGNOSIS — G47.33 OSA (OBSTRUCTIVE SLEEP APNEA): Primary | ICD-10-CM

## 2024-07-15 DIAGNOSIS — G47.34 SLEEP RELATED HYPOXIA: ICD-10-CM

## 2024-07-15 DIAGNOSIS — I10 ESSENTIAL HYPERTENSION: ICD-10-CM

## 2024-07-15 PROBLEM — R06.83 SNORING: Status: ACTIVE | Noted: 2024-07-15

## 2024-07-15 PROBLEM — R06.81 WITNESSED EPISODE OF APNEA: Status: ACTIVE | Noted: 2024-07-15

## 2024-07-16 ENCOUNTER — TELEPHONE (OUTPATIENT)
Age: 59
End: 2024-07-16

## 2024-07-16 ENCOUNTER — TELEPHONE (OUTPATIENT)
Dept: SLEEP CENTER | Facility: CLINIC | Age: 59
End: 2024-07-16

## 2024-07-16 NOTE — TELEPHONE ENCOUNTER
Per [employer]t message from patient, Neil has about a month wait for CPAP.  Patient will call when he schedules CPAP set up to schedule compliance follow up with Dr. Siddiqi.    Rx for CPAP, EMILY and clinicals sent to Neil via [facility] in another encounter.

## 2024-07-16 NOTE — TELEPHONE ENCOUNTER
Pt called in regarding DME equipment    He chose [facility] in [place]  They need prescription and sleep study results.    Fax- [phone]    Thank you

## 2024-07-17 DIAGNOSIS — E78.5 HYPERLIPIDEMIA, UNSPECIFIED HYPERLIPIDEMIA TYPE: ICD-10-CM

## 2024-07-18 RX ORDER — SIMVASTATIN 20 MG
TABLET ORAL
Qty: 90 TABLET | Refills: 1 | Status: SHIPPED | OUTPATIENT
Start: 2024-07-18

## 2024-07-30 DIAGNOSIS — I10 HYPERTENSION, UNSPECIFIED TYPE: Primary | ICD-10-CM

## 2024-07-31 RX ORDER — AMLODIPINE BESYLATE 5 MG/1
5 TABLET ORAL DAILY
Qty: 90 TABLET | Refills: 1 | Status: SHIPPED | OUTPATIENT
Start: 2024-07-31

## 2024-08-14 LAB
ALBUMIN SERPL-MCNC: 4.5 G/DL (ref 3.5–5.7)
ALP SERPL-CCNC: 60 U/L (ref 35–120)
ALT SERPL-CCNC: 34 U/L
ANION GAP SERPL CALCULATED.3IONS-SCNC: 10 MMOL/L (ref 3–11)
AST SERPL-CCNC: 24 U/L
BASOPHILS # BLD AUTO: 0 THOU/CMM (ref 0–0.1)
BASOPHILS NFR BLD AUTO: 1 %
BILIRUB SERPL-MCNC: 0.7 MG/DL (ref 0.2–1)
BUN SERPL-MCNC: 21 MG/DL (ref 7–28)
CALCIUM SERPL-MCNC: 9.9 MG/DL (ref 8.5–10.1)
CHLORIDE SERPL-SCNC: 105 MMOL/L (ref 100–109)
CHOLEST SERPL-MCNC: 191 MG/DL
CHOLEST/HDLC SERPL: 3.3 {RATIO}
CO2 SERPL-SCNC: 28 MMOL/L (ref 21–31)
CREAT SERPL-MCNC: 0.93 MG/DL (ref 0.53–1.3)
CYTOLOGY CMNT CVX/VAG CYTO-IMP: NORMAL
DIFFERENTIAL METHOD BLD: NORMAL
EOSINOPHIL # BLD AUTO: 0.1 THOU/CMM (ref 0–0.5)
EOSINOPHIL NFR BLD AUTO: 2 %
ERYTHROCYTE [DISTWIDTH] IN BLOOD BY AUTOMATED COUNT: 14.6 % (ref 12–16)
GFR/BSA.PRED SERPLBLD CYS-BASED-ARV: 94 ML/MIN/{1.73_M2}
GLUCOSE SERPL-MCNC: 83 MG/DL (ref 65–99)
HCT VFR BLD AUTO: 46.1 % (ref 37–48)
HDLC SERPL-MCNC: 58 MG/DL (ref 23–92)
HGB BLD-MCNC: 15.2 G/DL (ref 12.5–17)
LDLC SERPL CALC-MCNC: 115 MG/DL
LYMPHOCYTES # BLD AUTO: 1.1 THOU/CMM (ref 1–3)
LYMPHOCYTES NFR BLD AUTO: 19 %
MCH RBC QN AUTO: 30.7 PG (ref 27–36)
MCHC RBC AUTO-ENTMCNC: 33.1 G/DL (ref 32–37)
MCV RBC AUTO: 93 FL (ref 80–100)
MONOCYTES # BLD AUTO: 0.7 THOU/CMM (ref 0.3–1)
MONOCYTES NFR BLD AUTO: 12 %
NEUTROPHILS # BLD AUTO: 3.9 THOU/CMM (ref 1.8–7.8)
NEUTROPHILS NFR BLD AUTO: 66 %
NONHDLC SERPL-MCNC: 133 MG/DL
PLATELET # BLD AUTO: 224 THOU/CMM (ref 140–350)
PMV BLD REES-ECKER: 9.2 FL (ref 7.5–11.3)
POTASSIUM SERPL-SCNC: 4.3 MMOL/L (ref 3.5–5.2)
PROT SERPL-MCNC: 6.4 G/DL (ref 6.3–8.3)
RBC # BLD AUTO: 4.97 MILL/CMM (ref 4–5.4)
SODIUM SERPL-SCNC: 143 MMOL/L (ref 135–145)
TRIGL SERPL-MCNC: 89 MG/DL
TSH SERPL-ACNC: 2.75 UIU/ML (ref 0.45–5.33)
WBC # BLD AUTO: 5.9 THOU/CMM (ref 4–10.5)

## 2024-08-26 ENCOUNTER — OFFICE VISIT (OUTPATIENT)
Dept: FAMILY MEDICINE CLINIC | Facility: CLINIC | Age: 59
End: 2024-08-26
Payer: COMMERCIAL

## 2024-08-26 VITALS
HEART RATE: 67 BPM | DIASTOLIC BLOOD PRESSURE: 76 MMHG | WEIGHT: 167.4 LBS | HEIGHT: 70 IN | SYSTOLIC BLOOD PRESSURE: 112 MMHG | BODY MASS INDEX: 23.96 KG/M2 | TEMPERATURE: 97.3 F | OXYGEN SATURATION: 97 %

## 2024-08-26 DIAGNOSIS — Z00.00 ADULT GENERAL MEDICAL EXAMINATION: Primary | ICD-10-CM

## 2024-08-26 DIAGNOSIS — G47.33 OSA (OBSTRUCTIVE SLEEP APNEA): ICD-10-CM

## 2024-08-26 DIAGNOSIS — E78.5 HYPERLIPIDEMIA, UNSPECIFIED HYPERLIPIDEMIA TYPE: ICD-10-CM

## 2024-08-26 DIAGNOSIS — Z12.5 SCREENING FOR PROSTATE CANCER: ICD-10-CM

## 2024-08-26 DIAGNOSIS — I10 HYPERTENSION, UNSPECIFIED TYPE: ICD-10-CM

## 2024-08-26 DIAGNOSIS — I47.19 PAROXYSMAL ATRIAL TACHYCARDIA: ICD-10-CM

## 2024-08-26 PROCEDURE — 99214 OFFICE O/P EST MOD 30 MIN: CPT

## 2024-08-26 PROCEDURE — 99396 PREV VISIT EST AGE 40-64: CPT

## 2024-08-26 NOTE — PROGRESS NOTES
Adult Annual Physical  Name: Markie Robert      : 1965      MRN: [id number]  Encounter Provider: Oneida Garza PA-C  Encounter Date: 2024   Encounter department: [facility]    Assessment & Plan   1. Adult general medical examination  Assessment & Plan:  Patient presents today for an annual physical. Patient's medical history and medication list were reviewed and updated. Annual physical questionnaire was given to review current diet, exercise level, sleep health, dental health, visual health, hearing status, and advanced care planning status.    Preventative health needs were reviewed and assessed today:   Immunizations:   Flu - none  COVID - none  Tdap - UTD  Shingles - counseling given, patient defers    CRC - UTD, last completed in 2023 with 7 year f/u  PSA - has not had screen since , will recheck with next labs     Physical examination unremarkable today. Patient appears to be in good health for his age. VSS.     Diet/Exercise:  Recommend at least 150 minutes of moderate to vigorous cardiovascular exercise such as running, walking, biking, or swimming. Proper exercise should also include strength training 1-2 days/week to ensure good bone health and muscle tone.   Recommend balanced meals with attention to protein, fat and carbohydrate intake. Recommend decreasing sugars and saturated fats in the diet, and replacing these foods with healthier fruits and vegetables. Recommend whole grains and low-fat milk to ensure proper vitamin D and calcium intake. Avoid added sugars and excess sodium. Pay attention to sugary drinks that may increase total calorie intake in a day, such as iced coffee, iced tea, and sodas. Recommended use of MyPlate.gov to determine proper daily values of macronutrients based on patient's height, weight and age.     Follow up in 6 months for next recheck.    2. Hypertension, unspecified type  Assessment & Plan:  Blood pressure stable with  amlodipine 5mg and valsartan 80mg. Continue current regimen. 6 month follow up. Also continue cardiology follow ups.   Orders:  -     Comprehensive metabolic panel; Future; Expected date: 01/01/2025  -     CBC and differential; Future; Expected date: 01/01/2025  -     Comprehensive metabolic panel  -     CBC and differential  3. TAYE (obstructive sleep apnea)  Assessment & Plan:  Follows with sleep medicine.   4. Paroxysmal atrial tachycardia  Assessment & Plan:  S/p ablation in 2/2024. Patient follows with cardiology, has been doing well with no chest pain, palpitations, lightheadedness, dizziness, or SOB.   5. Hyperlipidemia, unspecified hyperlipidemia type  Assessment & Plan:  Cholesterol stable with recent labs. Currently takes simvastatin 20mg daily. ASCVD 6.3%.   6. Screening for prostate cancer  -     PSA, Total Screen; Future; Expected date: 01/01/2025  -     PSA, Total Screen    Immunizations and preventive care screenings were discussed with patient today. Appropriate education was printed on patient's after visit summary.    Discussed risks and benefits of prostate cancer screening. We discussed the controversial history of PSA screening for prostate cancer in the [place] as well as the risk of over detection and over treatment of prostate cancer by way of PSA screening.  The patient understands that PSA blood testing is an imperfect way to screen for prostate cancer and that elevated PSA levels in the blood may also be caused by infection, inflammation, prostatic trauma or manipulation, urological procedures, or by benign prostatic enlargement.    The role of the digital rectal examination in prostate cancer screening was also discussed and I discussed with him that there is large interobserver variability in the findings of digital rectal examination.    Counseling:  Alcohol/drug use: discussed moderation in alcohol intake, the recommendations for healthy alcohol use, and avoidance of illicit drug  use.  Dental Health: discussed importance of regular tooth brushing, flossing, and dental visits.  Injury prevention: discussed safety/seat belts, safety helmets, smoke detectors, carbon dioxide detectors, and smoking near bedding or upholstery.  Sexual health: discussed sexually transmitted diseases, partner selection, use of condoms, avoidance of unintended pregnancy, and contraceptive alternatives.  Exercise: the importance of regular exercise/physical activity was discussed. Recommend exercise 3-5 times per week for at least 30 minutes.          History of Present Illness     Adult Annual Physical:  Patient presents for annual physical. No concerns today. Had labs completed. Follows with cardiology and sleep medicine. .     Diet and Physical Activity:  - Diet/Nutrition: well balanced diet, consuming 3-5 servings of fruits/vegetables daily, adequate fiber intake and adequate whole grain intake.  - Exercise: walking, 3-4 times a week on average and 30-60 minutes on average.    Depression Screening:  - PHQ-2 Score: 0    General Health:  - Sleep: 7-8 hours of sleep on average, sleeps well, daytime hypersomnolence and unrefreshing sleep. working on wearing CPAP regularly  - Hearing: normal hearing bilateral ears.  - Vision: most recent eye exam > 1 year ago and vision problems.  - Dental: regular dental visits and brushes teeth twice daily.     Health:  - History of STDs: no.   - Urinary symptoms: none.     Review of Systems   Constitutional:  Negative for chills and fever.   HENT:  Negative for congestion, sinus pressure, sore throat and trouble swallowing.    Respiratory:  Negative for cough, chest tightness and shortness of breath.    Cardiovascular:  Negative for chest pain, palpitations and leg swelling.   Gastrointestinal:  Negative for abdominal pain, diarrhea, nausea and vomiting.   Genitourinary:  Negative for difficulty urinating, dysuria and hematuria.   Musculoskeletal:  Negative for arthralgias, back  "pain and myalgias.   Neurological:  Negative for dizziness, seizures, syncope, light-headedness and headaches.   Hematological:  Does not bruise/bleed easily.   Psychiatric/Behavioral:  Negative for behavioral problems and confusion. The patient is not nervous/anxious.    All other systems reviewed and are negative.        Objective     /76   Pulse 67   Temp (!) 97.3 °F (36.3 °C)   Ht 5' 10.08\" (1.78 m)   Wt 75.9 kg (167 lb 6.4 oz)   SpO2 97%   BMI 23.97 kg/m²     Physical Exam  Vitals and nursing note reviewed.   Constitutional:       General: He is not in acute distress.     Appearance: Normal appearance. He is normal weight. He is not ill-appearing.   HENT:      Head: Normocephalic and atraumatic.      Right Ear: Tympanic membrane normal.      Left Ear: Tympanic membrane normal.      Nose: Nose normal.      Mouth/Throat:      Mouth: Mucous membranes are moist.      Pharynx: Oropharynx is clear. No oropharyngeal exudate or posterior oropharyngeal erythema.   Eyes:      Extraocular Movements: Extraocular movements intact.      Pupils: Pupils are equal, round, and reactive to light.   Neck:      Vascular: No carotid bruit.   Cardiovascular:      Rate and Rhythm: Normal rate and regular rhythm.      Heart sounds: No murmur heard.  Pulmonary:      Effort: Pulmonary effort is normal. No respiratory distress.      Breath sounds: Normal breath sounds.   Musculoskeletal:         General: Normal range of motion.      Cervical back: Normal range of motion and neck supple.      Right lower leg: No edema.      Left lower leg: No edema.   Lymphadenopathy:      Cervical: No cervical adenopathy.   Neurological:      General: No focal deficit present.      Mental Status: He is alert and oriented to person, place, and time. Mental status is at baseline.   Psychiatric:         Mood and Affect: Mood normal.         Behavior: Behavior normal.         Judgment: Judgment normal.         "

## 2024-08-26 NOTE — ASSESSMENT & PLAN NOTE
Blood pressure stable with amlodipine 5mg and valsartan 80mg. Continue current regimen. 6 month follow up. Also continue cardiology follow ups.

## 2024-08-26 NOTE — ASSESSMENT & PLAN NOTE
Patient presents today for an annual physical. Patient's medical history and medication list were reviewed and updated. Annual physical questionnaire was given to review current diet, exercise level, sleep health, dental health, visual health, hearing status, and advanced care planning status.    Preventative health needs were reviewed and assessed today:   Immunizations:   Flu - none  COVID - none  Tdap - UTD  Shingles - counseling given, patient defers    CRC - UTD, last completed in 12/2023 with 7 year f/u  PSA - has not had screen since 2021, will recheck with next labs     Physical examination unremarkable today. Patient appears to be in good health for his age. VSS.     Diet/Exercise:  Recommend at least 150 minutes of moderate to vigorous cardiovascular exercise such as running, walking, biking, or swimming. Proper exercise should also include strength training 1-2 days/week to ensure good bone health and muscle tone.   Recommend balanced meals with attention to protein, fat and carbohydrate intake. Recommend decreasing sugars and saturated fats in the diet, and replacing these foods with healthier fruits and vegetables. Recommend whole grains and low-fat milk to ensure proper vitamin D and calcium intake. Avoid added sugars and excess sodium. Pay attention to sugary drinks that may increase total calorie intake in a day, such as iced coffee, iced tea, and sodas. Recommended use of MyPlate.gov to determine proper daily values of macronutrients based on patient's height, weight and age.     Follow up in 6 months for next recheck.

## 2024-08-26 NOTE — ASSESSMENT & PLAN NOTE
S/p ablation in 2/2024. Patient follows with cardiology, has been doing well with no chest pain, palpitations, lightheadedness, dizziness, or SOB.

## 2024-09-11 ENCOUNTER — TELEPHONE (OUTPATIENT)
Dept: SLEEP CENTER | Facility: CLINIC | Age: 59
End: 2024-09-11

## 2024-09-11 NOTE — TELEPHONE ENCOUNTER
Overnight pulse oximetry results received via email from [employer].  Results scanned to media for Dr. Siddiqi's review.

## 2024-09-25 PROBLEM — Z00.00 ADULT GENERAL MEDICAL EXAMINATION: Status: RESOLVED | Noted: 2024-08-26 | Resolved: 2024-09-25

## 2024-10-22 ENCOUNTER — TELEPHONE (OUTPATIENT)
Dept: SLEEP CENTER | Facility: CLINIC | Age: 59
End: 2024-10-22

## 2024-10-22 NOTE — TELEPHONE ENCOUNTER
Confirmation of order form for CPAP and supplies received via fax from [employer]    Form faxed to the [place] office staff [phone] to have Dr. Siddiqi complete.    Completed form and copy of 10/24/24 office notes to be faxed to [employer] at [phone] and scanned to [employer].

## 2024-10-24 ENCOUNTER — OFFICE VISIT (OUTPATIENT)
Dept: SLEEP CENTER | Facility: CLINIC | Age: 59
End: 2024-10-24
Payer: COMMERCIAL

## 2024-10-24 VITALS
OXYGEN SATURATION: 99 % | SYSTOLIC BLOOD PRESSURE: 122 MMHG | BODY MASS INDEX: 23.62 KG/M2 | WEIGHT: 165 LBS | HEIGHT: 70 IN | DIASTOLIC BLOOD PRESSURE: 81 MMHG

## 2024-10-24 DIAGNOSIS — G47.33 OSA (OBSTRUCTIVE SLEEP APNEA): Primary | ICD-10-CM

## 2024-10-24 PROCEDURE — 99213 OFFICE O/P EST LOW 20 MIN: CPT

## 2024-10-24 NOTE — PATIENT INSTRUCTIONS
Continue PAP Therapy  - Continue APAP at 5-8cmH2O.  Remember to clean your mask and equipment regularly, as directed.  You should be eligible for new supplies approximately every 3-6 months, depending on your insurance coverage. Contact your Durable Medical Equipment (DME) company for new supplies as needed.  Follow up in 6 months    Care and Maintenance  Headgear should be washed as needed. Daily inspection and weekly washings are recommended. Do not disassemble the straps. Machine wash in warm water, making sure to attach Velcro hooks and tabs before washing. Line dry or machine dry on a low setting.  Masks should be washed every day. Daily inspection is recommended. Leave the mask and tubing attached. Gently wash the mask with a soft cloth using warm water and mild detergent, concentrating on the mask cushion flaps. DO NOT use alcohol or bleach. Rinse thoroughly and air dry.  Tubing and headgear should be washed weekly. Daily inspection is recommended. Wash in warm water and mild detergent and rinse thoroughly. Hook the tubing to the machine and blow until dry.  Humidifier should be washed daily and filled with DISTILLED water before use. Wash with warm water and mild detergent. Disinfect weekly by soaking with a solution of 1 part white vinegar and 3 parts water for 30 minutes. Rinse thoroughly and air dry.  Disposable filters should be replaced once a month. Wash reusable foam filters with warm water and mild detergent at least once a month. Rinse thoroughly and dry with paper towels.  Avoid  that contain fragrance or conditioners, as these will leave a residue.  NEVER iron any soft goods.    Insurance Requirements  Your insurance requires a face-to-face follow up visit within a 31-90 day period after starting PAP therapy.  Your insurance requires compliance with your PAP device, which is at least 4 hours per night for 70% of the time. This must be done over a 30 day period and must occur within the  initial 31-90 day period after starting CPAP.  Your insurance also requires at least yearly follow ups to continue to pay for CPAP supplies.     PAP Supply Guidelines  Below are the guidelines for reordering your supplies. You will be responsible for your deductible, co payments, and out of pocket expenses.    Item Frequency   Nasal Mask (no headgear) 1 every 3 months   Nasal Mask Cushion 1 every 2 weeks   Full Face Mask (no headgear) 1 every 3 months   Full Face Mask Cushion 1 every month   Nasal Pillows 1 every 2 weeks   Headgear 1 every 6 months   hin Strap 1 every 6 months   hailey 1 every 3 months   Filters: Reusable 1 every 6 months   Filters: Disposable 1 every 2 weeks   Humidifier Chamber(disposable) 1 every 6 months

## 2024-10-24 NOTE — PROGRESS NOTES
[facility]  Sleep Medicine Follow Up/Established Patient    PATIENT NAME: Markie Robert  DATE OF SERVICE: October 24, 2024  DATE OF LAST VISIT: 6/3/2024    ASSESSMENT/PLAN:  Markie Robert is a 59 y.o. male with PMHx of TAYE on CPAP, HTN, SVT s/p ablation, HLD, GERD and h/o Lyme disease who returns to the office for follow up.    TAYE (Obstructive Sleep Apnea)  -The patient has a history of mild TAYE diagnosed on HSAT in 2024 (HOMER 6.8, supine HOMER 12.4, O2 connor 70%, TST <90% 88.3 min) and is currently prescribed auto CPAP 5-15 cmH2O.  He is here for his initial compliance follow-up and is using the device consistently, but has not noticed much of a change in how he feels.  - Therapy and compliance data reviewed: residual AHI 1.8, compliance 97% and mask leak is significantly elevated  - Given leak, discussed mask refitting with the patient, but he is very comfortable with his current mask.  - Will adjust pressures to APAP 5-8 cmH2O with a full-face mask.  - Asked patient to continue using the device, will plan to reevaluate in a few months if he is not having benefit we will consider additional testing.  -We discussed that sleeping less than 7 hours a night may lead to daytime symptoms of feeling unrested despite adequate CPAP use, recommend increasing his sleep to at least 7 hours every night.  - Refill of supplies will be sent to the patient's DME.  - Explained the importance of keeping the machine clean, and that they should be eligible for refills of supplies every 3-6 months depending on insurance.  We also discussed the insurance compliance requirements.  - Encouraged healthy lifestyle with adequate sleep (7-9 hours per night), healthy balanced diet and routine exercise.  Explained the importance of avoiding driving while drowsy.  - Follow-up in 6 months  -     PAP DME Pressure Change  -     PAP DME  Resupply/Reorder    ________________________________________________________________________________________________    Interval History: Markie Robert is a 59 y.o. male with PMHx of TAYE on CPAP, HTN, SVT s/p ablation, HLD, GERD and h/o Lyme disease who returns to the office for follow up.  He reports that he has been able to use the CPAP nightly, but that he has not noticed much of a difference in how he feels.  He still states he wakes up feeling fatigued/groggy.  He does note that he is sleeping less than 7 hours a night roughly 2-3 times a week.  He denies any issues with aerophagia, pressure intolerance, poor mask fit, mask leak or rainout, but there is significantly elevated mask leak noted on his download.  He states he finds his current mask style comfortable and is not interested in switching masks.    PAP History  Sleep Apnea Type: TAYE  Most Recent AHI: 6.8 in 2024  Treatment: APAP    DME: [facility]    Uses APAP for 6.5-7 hours per night, 6-7 nights per week.  Current PAP Settings: 5-15 cmH2O  Compliance Data: FFM    Mask Type: full-face  PAP Issues: None  Uses Chin Strap: No  Uses Ramp Function: Yes   Uses Humidity: No    There is not a perceived benefit by the patient: he doesn't not a difference  He is unsure about residual snoring as he sleeps separate from his partner    Prior History: The patient initially stab with [facility] in 6/2024 at which time he had symptoms concerning for sleep apnea and plan was for HSAT.  Testing revealed mild TAYE (HOMER 6.8, supine HOMER 12.4, O2 connor 70%, TST <90% 88.3 min) and recommendation was for initiation of auto CPAP 5-15 cmH2O.  He was also recommended to have an overnight oximetry once on CPAP to assure resolution of his event related hypoxia noted on HSAT.  Overnight oximetry showed normalization of saturations with PAP therapy.  He is now returning for initial compliance follow-up.    ESS: Total score: (P) 3/24  Greater or equal to 10 is positive for  excessive daytime sleepiness  Pertinent Meds: None    Sleep-Wake Schedule:  Bedtime: 3358-8916  Wake Time: 0830  Difficulty Falling Asleep: No  Avg Number of Awakenings: 1-2x  Cause of Awakenings: bathroom  Weekend Sleep Schedule: 2230 - 0830  Naps: denies    Avg TST per 24 hours: 6-7 hours    Past Treatments:  APAP 5-15 cmH2O    Prior Sleep Studies:  HSAT -- 7/12/2024 (Wt 166.0 lbs)  HOMER - 6.8  Sup HOMER - 12.4  O2 Finn - 78.0%  TST < 90% - 88.3 min    Other Relevant Labs and Studies:  Therapy and Compliance Data -- 9/24/2024 - 10/23/2024      Past Medical History:   Diagnosis Date    Hyperlipidemia     Hypertension     s/p atrial tachycardia ablation 2/16/2024 02/17/2024     Past Surgical History:   Procedure Laterality Date    CARDIAC ELECTROPHYSIOLOGY PROCEDURE N/A 2/16/2024    Procedure: Cardiac eps/svt ablation;  Surgeon: Minor Vargas MD;  Location:  CARDIAC CATH LAB;  Service: Cardiology    HEMORRHOID SURGERY      HERNIA REPAIR      ME COLONOSCOPY FLX DX W/COLLJ SPEC WHEN PFRMD N/A 10/2/2018    Procedure: COLONOSCOPY;  Surgeon: Mo St MD;  Location: [facility] GI LAB;  Service: Gastroenterology    ME CYSTO/URETERO W/LITHOTRIPSY &INDWELL STENT INSRT Right 9/26/2023    Procedure: CYSTOSCOPY URETEROSCOPY WITH LITHOTRIPSY HOLMIUM LASER, AND INSERTION STENT URETERAL;  Surgeon: Shad Kenyon MD;  Location: [facility] OR;  Service: Urology     Patient Active Problem List   Diagnosis    Umbilical hernia without obstruction and without gangrene    HLD (hyperlipidemia)    Grade II hemorrhoids    Dyspepsia    History of Lyme disease    Hypertension    Arthralgia    Insomnia    Nephrolithiasis    Paroxysmal atrial tachycardia (HCC)    Sinus pause    s/p atrial tachycardia ablation 2/16/2024    Bilateral inguinal hernia without obstruction or gangrene    TAYE (obstructive sleep apnea)    Essential hypertension    Sleep related hypoxia    Snoring    Witnessed episode of apnea     Allergies as of 10/24/2024    (No Known  "Allergies)     REVIEW OF SYSTEMS:  Review of Systems  10-point system review completed, all of which are negative except as mentioned above.    CURRENT MEDICATIONS:  Current Outpatient Medications   Medication Instructions    amLODIPine (NORVASC) 5 mg, Oral, Daily    simvastatin (ZOCOR) 20 mg tablet take one tablet (20mg total) by mouth daily at bedtime    valsartan (DIOVAN) 80 mg, Oral, Daily     SOCIAL HISTORY:  Social History     Tobacco Use    Smoking status: Never    Smokeless tobacco: Never   Vaping Use    Vaping status: Never Used   Substance Use Topics    Alcohol use: Not Currently     Comment: 1 or 2 drinks once or twice a month    Drug use: No     FAMILY HISTORY:  Family History   Problem Relation Age of Onset    Hypertension Mother     Hyperlipidemia Mother     Hyperlipidemia Father     No Known Problems Brother      PHYSICAL EXAMINATION:  Vital Signs:  /81 (BP Location: Left arm, Patient Position: Sitting, Cuff Size: Large)   Ht 5' 10\" (1.778 m)   Wt 74.8 kg (165 lb)   SpO2 99%   BMI 23.68 kg/m²   Body mass index is 23.68 kg/m².  Constitutional: NAD, well appearing   Mental Status: AAOx3  Skin: Warm, dry, no rashes noted   Eyes: PERRL, normal conjunctiva  ENT: Nasal congestion absent, nasal valve incompetence absent.  Posterior Airspace:   Hooper Tongue Position: 3  Retrognathia: absent  Overbite: absent  High Arched Palate: absent  Tongue Scalloping/Ridging: present  Tonsils: absent  Uvula: normal  Chest: RRR, +S1/S2, CTA B/L, no W/R/R, no M/R/G   Abdomen: Soft, NT/ND  Extremities: No digital clubbing or pedal edema      Indira Siddiqi MD  Pulmonary-Critical Care and Sleep Medicine  10/24/24    Portions of the record may have been created with voice recognition software. Occasional wrong word or \"sound a like\" substitutions may have occurred due to the inherent limitations of voice recognition software. Please read the chart carefully and recognize, using context, where substitutions have " occurred.

## 2024-10-26 ENCOUNTER — TELEPHONE (OUTPATIENT)
Dept: SLEEP CENTER | Facility: CLINIC | Age: 59
End: 2024-10-26

## 2024-11-01 ENCOUNTER — OFFICE VISIT (OUTPATIENT)
Dept: FAMILY MEDICINE CLINIC | Facility: CLINIC | Age: 59
End: 2024-11-01
Payer: COMMERCIAL

## 2024-11-01 ENCOUNTER — TELEPHONE (OUTPATIENT)
Age: 59
End: 2024-11-01

## 2024-11-01 VITALS
HEIGHT: 70 IN | TEMPERATURE: 98 F | DIASTOLIC BLOOD PRESSURE: 86 MMHG | SYSTOLIC BLOOD PRESSURE: 120 MMHG | BODY MASS INDEX: 23.68 KG/M2 | WEIGHT: 165.4 LBS | HEART RATE: 74 BPM | OXYGEN SATURATION: 94 %

## 2024-11-01 DIAGNOSIS — K42.9 UMBILICAL HERNIA WITHOUT OBSTRUCTION AND WITHOUT GANGRENE: Primary | ICD-10-CM

## 2024-11-01 PROCEDURE — 99213 OFFICE O/P EST LOW 20 MIN: CPT

## 2024-11-01 NOTE — TELEPHONE ENCOUNTER
Patient called in to schedule hernia consult from referral.  Patient is scheduled for 11/11 with Farzaneh

## 2024-11-01 NOTE — ASSESSMENT & PLAN NOTE
Patient requesting referral today for general surgery to repair his umbilical hernia.  Reports it started to get bigger in the past few weeks and he has had a few episodes of mild pain.  Based on exam today there are no red flag signs, no signs of strangulation or gangrene.  Referral was placed for umbilical hernia repair, he was given ER precautions if any pain worsens, size increases or if any changes in bowel movements occur.  Patient agreeable with plan today and will follow-up as needed.  Orders:    Ambulatory Referral to General Surgery; Future

## 2024-11-01 NOTE — PROGRESS NOTES
"Ambulatory Visit  Name: Markie Robert      : 1965      MRN: [id number]  Encounter Provider: Oneida Garza PA-C  Encounter Date: 2024   Encounter department: [facility]    Assessment & Plan  Umbilical hernia without obstruction and without gangrene  Patient requesting referral today for general surgery to repair his umbilical hernia.  Reports it started to get bigger in the past few weeks and he has had a few episodes of mild pain.  Based on exam today there are no red flag signs, no signs of strangulation or gangrene.  Referral was placed for umbilical hernia repair, he was given ER precautions if any pain worsens, size increases or if any changes in bowel movements occur.  Patient agreeable with plan today and will follow-up as needed.  Orders:    Ambulatory Referral to General Surgery; Future       History of Present Illness     Patient presents today for evaluation of possible umbilical hernia.  States it started with some mildly painful at times, had 2 episodes of this in the past 2 weeks.  Patient reports it started to get a bit bigger than normal and has a slight purple color.  No changes in bowel movements.  No abdominal pain otherwise.  Has had this hernia since 2018 but it was mild and did not require repair at that time.          Review of Systems   Gastrointestinal:  Positive for abdominal pain (mild). Negative for constipation, diarrhea, nausea and vomiting.        Umbilical hernia           Objective     /86 (BP Location: Left arm, Patient Position: Sitting, Cuff Size: Standard)   Pulse 74   Temp 98 °F (36.7 °C) (Temporal)   Ht 5' 10\" (1.778 m)   Wt 75 kg (165 lb 6.4 oz)   SpO2 94%   BMI 23.73 kg/m²     Physical Exam  Vitals and nursing note reviewed.   Constitutional:       General: He is not in acute distress.     Appearance: Normal appearance. He is not ill-appearing.   HENT:      Head: Normocephalic and atraumatic.   Pulmonary:      Effort: " Pulmonary effort is normal. No respiratory distress.   Abdominal:      Hernia: A hernia is present. Hernia is present in the umbilical area.      Comments: No signs of strangulation. Minimal pain of umbilical hernia with palpation.    Skin:     Coloration: Skin is not cyanotic or pale.   Neurological:      General: No focal deficit present.      Mental Status: He is alert and oriented to person, place, and time.   Psychiatric:         Mood and Affect: Mood normal.         Behavior: Behavior normal.         Judgment: Judgment normal.

## 2024-11-04 ENCOUNTER — TELEPHONE (OUTPATIENT)
Dept: SLEEP CENTER | Facility: CLINIC | Age: 59
End: 2024-11-04

## 2024-11-04 NOTE — TELEPHONE ENCOUNTER
DME form received via fax from [facility].  Form faxed to the [place] office for Dr. Siddiqi's signature.    Signed form and most recent office note to be faxed [facility] [phone].

## 2024-11-11 ENCOUNTER — CONSULT (OUTPATIENT)
Dept: SURGERY | Facility: CLINIC | Age: 59
End: 2024-11-11
Payer: COMMERCIAL

## 2024-11-11 VITALS
RESPIRATION RATE: 16 BRPM | OXYGEN SATURATION: 99 % | WEIGHT: 169 LBS | TEMPERATURE: 97.2 F | SYSTOLIC BLOOD PRESSURE: 118 MMHG | HEIGHT: 70 IN | DIASTOLIC BLOOD PRESSURE: 82 MMHG | BODY MASS INDEX: 24.2 KG/M2 | HEART RATE: 68 BPM

## 2024-11-11 DIAGNOSIS — Z86.79 S/P RF ABLATION OPERATION FOR ARRHYTHMIA: ICD-10-CM

## 2024-11-11 DIAGNOSIS — I10 ESSENTIAL HYPERTENSION: Primary | ICD-10-CM

## 2024-11-11 DIAGNOSIS — K42.9 UMBILICAL HERNIA WITHOUT OBSTRUCTION AND WITHOUT GANGRENE: ICD-10-CM

## 2024-11-11 DIAGNOSIS — Z98.890 S/P RF ABLATION OPERATION FOR ARRHYTHMIA: ICD-10-CM

## 2024-11-11 PROCEDURE — 99243 OFF/OP CNSLTJ NEW/EST LOW 30: CPT | Performed by: PHYSICIAN ASSISTANT

## 2024-11-11 RX ORDER — SODIUM CHLORIDE, SODIUM LACTATE, POTASSIUM CHLORIDE, CALCIUM CHLORIDE 600; 310; 30; 20 MG/100ML; MG/100ML; MG/100ML; MG/100ML
125 INJECTION, SOLUTION INTRAVENOUS CONTINUOUS
OUTPATIENT
Start: 2025-01-06

## 2024-11-11 NOTE — PROGRESS NOTES
Assessment/Plan:   Markie Robert is a 59 y.o.male who is here for   Chief Complaint   Patient presents with    Hernia     Patient is here today regarding a Umbilical hernia ongoing 6 years has been getting worse and more painful feels a lot of pressure for the last 4-6 weeks, No constipation diarrhea nausea or vomiting.            Plan: open repair of Umbilical Hernia easily reducible. Probable primary but possible mesh    Preoperative Clearance: None        In preparation for this visit all relevant and known prior office notes, prior consultations, emergency room visits, blood work results, and imaging studies were personally reviewed.  A total of  30 minutes was spent reviewing all of this information,caring for this patient, providing differential diagnosis, instructions for management, counseling and coordination of care.  This also includes planning surgical intervention where indicated.    Patient understands hernia occurrence or re-occurrence risk is higher in a diabetic, tobacco user, with elevated BMIs.   _____________________________________________      HPI:  Markie Robert is a 59 y.o.male who was referred for evaluation of Hernia (Patient is here today regarding a Umbilical hernia ongoing 6 years has been getting worse and more painful feels a lot of pressure for the last 4-6 weeks, No constipation diarrhea nausea or vomiting. )  .    Patient states he has had an umbilical hernia for 6 years but over the last 3-4 weeks has been more painful and bothers him while lifting heavy objects at work. He denies any constipation or bowel changes. Patient does not smoke and is not a diabetic.       Prior abdominal surgery:   Yes; open inguinal hernia repair    BMI: Body mass index is 24.25 kg/m².     Tobacco use:   Tobacco Use: Low Risk  (11/11/2024)    Patient History     Smoking Tobacco Use: Never     Smokeless Tobacco Use: Never     Passive Exposure: Not on file        Lab Results   Component Value Date     "WBC 5.9 08/14/2024    HGB 15.2 08/14/2024    HCT 46.1 08/14/2024    MCV 93 08/14/2024     08/14/2024     Lab Results   Component Value Date    K 4.3 08/14/2024     08/14/2024    CO2 28 08/14/2024    BUN 21 08/14/2024    CREATININE 0.93 08/14/2024    GLUF 83 02/13/2024    CALCIUM 9.9 08/14/2024    AST 24 08/14/2024    ALT 34 08/14/2024    ALKPHOS 60 08/14/2024    EGFR 94 08/14/2024     No results found for: \"INR\", \"PROTIME\"    Smoking Status: Non-smoker     ROS:  General ROS: negative  negative for - chills, fatigue, fever or night sweats, weight loss  Respiratory ROS: no cough, shortness of breath, or wheezing  Cardiovascular ROS: no chest pain or dyspnea on exertion  Genito-Urinary ROS: no dysuria, trouble voiding, or hematuria  Musculoskeletal ROS: negative for - gait disturbance, joint pain or muscle pain  Neurological ROS: no TIA or stroke symptoms  Abdominal ROS: see HPI  GI ROS: see HPI  Skin ROS: no new rashes or lesions   Lymphatic ROS: no new adenopathy noted by pt.   GYN ROS: see HPI, no new GYN history or bleeding noted  Psy ROS: no new mental or behavioral disturbances       Patient Active Problem List   Diagnosis    Umbilical hernia without obstruction and without gangrene    HLD (hyperlipidemia)    Grade II hemorrhoids    Dyspepsia    History of Lyme disease    Hypertension    Arthralgia    Insomnia    Nephrolithiasis    Paroxysmal atrial tachycardia (HCC)    Sinus pause    s/p atrial tachycardia ablation 2/16/2024    Bilateral inguinal hernia without obstruction or gangrene    TAYE (obstructive sleep apnea)    Essential hypertension    Sleep related hypoxia    Snoring    Witnessed episode of apnea         Allergies: Patient has no known allergies.    Meds:  Current Outpatient Medications:     amLODIPine (NORVASC) 5 mg tablet, TAKE ONE TABLET BY MOUTH EVERY DAY, Disp: 90 tablet, Rfl: 1    simvastatin (ZOCOR) 20 mg tablet, take one tablet (20mg total) by mouth daily at bedtime, Disp: 90 " tablet, Rfl: 1    valsartan (DIOVAN) 80 mg tablet, Take 1 tablet (80 mg total) by mouth daily, Disp: , Rfl:     PMH:  Past Medical History:   Diagnosis Date    Hyperlipidemia     Hypertension     s/p atrial tachycardia ablation 2/16/2024 02/17/2024       PSH:  Past Surgical History:   Procedure Laterality Date    CARDIAC ELECTROPHYSIOLOGY PROCEDURE N/A 2/16/2024    Procedure: Cardiac eps/svt ablation;  Surgeon: Minor Vargas MD;  Location:  CARDIAC CATH LAB;  Service: Cardiology    HEMORRHOID SURGERY      HERNIA REPAIR      PA COLONOSCOPY FLX DX W/COLLJ SPEC WHEN PFRMD N/A 10/2/2018    Procedure: COLONOSCOPY;  Surgeon: Mo St MD;  Location: [facility] GI LAB;  Service: Gastroenterology    PA CYSTO/URETERO W/LITHOTRIPSY &INDWELL STENT INSRT Right 9/26/2023    Procedure: CYSTOSCOPY URETEROSCOPY WITH LITHOTRIPSY HOLMIUM LASER, AND INSERTION STENT URETERAL;  Surgeon: Shad Kenyon MD;  Location: [facility] OR;  Service: Urology       Family History   Problem Relation Age of Onset    Hypertension Mother     Hyperlipidemia Mother     Hyperlipidemia Father     No Known Problems Brother         reports that he has never smoked. He has never used smokeless tobacco. He reports current alcohol use. He reports that he does not use drugs.    Vitals:    11/11/24 0816   BP: 118/82   Pulse: 68   Resp: 16   Temp: (!) 97.2 °F (36.2 °C)   SpO2: 99%       PHYSICAL EXAM  General Appearance:    Alert, cooperative, no distress,    Head:    Normocephalic without obvious abnormality   Eyes:    PERRL, conjunctiva/corneas clear,      Neck:   Supple, no adenopathy, no JVD   Back:     Symmetric, no spinal or CVA tenderness   Lungs:     Clear to auscultation bilaterally, no wheezing or rhonchi   Heart:    Regular rate and rhythm, S1 and S2 normal, no murmur   Abdomen:      abdomen is soft without significant tenderness, masses, organomegaly or guarding Bowel sounds are normal.    umbilical hernia  The hernia is, easily reducible, <2 cm    Extremities:   Extremities normal. No clubbing, cyanosis or edema   Psych:   Normal Affect, AOx3.    Neurologic:  Skin:   CNII-XII intact. Strength symmetric, speech intact    Warm, dry, intact, no visible rashes or lesions                 Signature:   Farzaneh Sommer PA-C    Date: 11/11/2024 Time: 8:36 AM

## 2024-11-18 ENCOUNTER — TELEPHONE (OUTPATIENT)
Age: 59
End: 2024-11-18

## 2024-11-18 NOTE — TELEPHONE ENCOUNTER
Patient left some disability forms at his last appt. Patient forgot to fill out the employee section so he is hoping to get a call when completed to  so he can complete his portion. He can be reached at [phone]. Also advised typically forms are done a little closer to surgery and he is asking for a call to advise when to expect forms completed.

## 2024-12-20 NOTE — PRE-PROCEDURE INSTRUCTIONS
Pre-Surgery Instructions:   Medication Instructions    amLODIPine (NORVASC) 5 mg tablet Take day of surgery.    simvastatin (ZOCOR) 20 mg tablet Take night before surgery    valsartan (DIOVAN) 80 mg tablet Hold day of surgery.    Medication instructions for day surgery reviewed. Please use only a sip of water to take your instructed medications. Avoid all over the counter vitamins, supplements and NSAIDS for one week prior to surgery per anesthesia guidelines. Tylenol is ok to take as needed.     You will receive a call one business day prior to surgery with an arrival time and hospital directions. If your surgery is scheduled on a Monday, the hospital will be calling you on the Friday prior to your surgery. If you have not heard from anyone by 8pm, please call the hospital supervisor through the hospital  at [phone]. ([place] [phone] or [place] [phone]).    Do not eat or drink anything after midnight the night before your surgery, including candy, mints, lifesavers, or chewing gum. Do not drink alcohol 24hrs before your surgery. Try not to smoke at least 24hrs before your surgery.       Follow the pre surgery showering instructions as listed in the “My Surgical Experience Booklet” or otherwise provided by your surgeon's office. Do not use a blade to shave the surgical area 1 week before surgery. It is okay to use a clean electric clippers up to 24 hours before surgery. Do not apply any lotions, creams, including makeup, cologne, deodorant, or perfumes after showering on the day of your surgery. Do not use dry shampoo, hair spray, hair gel, or any type of hair products.     No contact lenses, eye make-up, or artificial eyelashes. Remove nail polish, including gel polish, and any artificial, gel, or acrylic nails if possible. Remove all jewelry including rings and body piercing jewelry.     Wear causal clothing that is easy to take on and off. Consider your type of surgery.    Keep any  valuables, jewelry, piercings at home. Please bring any specially ordered equipment (sling, braces) if indicated.    Arrange for a responsible person to drive you to and from the hospital on the day of your surgery. Please confirm the visitor policy for the day of your procedure when you receive your phone call with an arrival time.     Call the surgeon's office with any new illnesses, exposures, or additional questions prior to surgery.    Please reference your “My Surgical Experience Booklet” for additional information to prepare for your upcoming surgery.

## 2024-12-26 ENCOUNTER — RESULTS FOLLOW-UP (OUTPATIENT)
Dept: FAMILY MEDICINE CLINIC | Facility: CLINIC | Age: 59
End: 2024-12-26

## 2024-12-27 LAB
BASOPHILS # BLD AUTO: 0 THOU/CMM (ref 0–0.1)
BASOPHILS NFR BLD AUTO: 1 %
DIFFERENTIAL METHOD BLD: ABNORMAL
EOSINOPHIL # BLD AUTO: 0 THOU/CMM (ref 0–0.5)
EOSINOPHIL NFR BLD AUTO: 1 %
ERYTHROCYTE [DISTWIDTH] IN BLOOD BY AUTOMATED COUNT: 13.7 % (ref 12–16)
HCT VFR BLD AUTO: 44.5 % (ref 37–48)
HGB BLD-MCNC: 15.3 G/DL (ref 12.5–17)
LYMPHOCYTES # BLD AUTO: 0.9 THOU/CMM (ref 1–3)
LYMPHOCYTES NFR BLD AUTO: 15 %
MCH RBC QN AUTO: 31.7 PG (ref 27–36)
MCHC RBC AUTO-ENTMCNC: 34.5 G/DL (ref 32–37)
MCV RBC AUTO: 92 FL (ref 80–100)
MONOCYTES # BLD AUTO: 0.5 THOU/CMM (ref 0.3–1)
MONOCYTES NFR BLD AUTO: 8 %
NEUTROPHILS # BLD AUTO: 4.2 THOU/CMM (ref 1.8–7.8)
NEUTROPHILS NFR BLD AUTO: 75 %
PLATELET # BLD AUTO: 227 THOU/CMM (ref 140–350)
PMV BLD REES-ECKER: 8.6 FL (ref 7.5–11.3)
RBC # BLD AUTO: 4.84 MILL/CMM (ref 4–5.4)
WBC # BLD AUTO: 5.6 THOU/CMM (ref 4–10.5)

## 2025-01-03 ENCOUNTER — ANESTHESIA EVENT (OUTPATIENT)
Dept: PERIOP | Facility: HOSPITAL | Age: 60
End: 2025-01-03
Payer: COMMERCIAL

## 2025-01-06 ENCOUNTER — HOSPITAL ENCOUNTER (OUTPATIENT)
Facility: HOSPITAL | Age: 60
Setting detail: OUTPATIENT SURGERY
Discharge: HOME | End: 2025-01-06
Attending: SURGERY | Admitting: SURGERY
Payer: COMMERCIAL

## 2025-01-06 ENCOUNTER — ANESTHESIA (OUTPATIENT)
Dept: PERIOP | Facility: HOSPITAL | Age: 60
End: 2025-01-06
Payer: COMMERCIAL

## 2025-01-06 VITALS
TEMPERATURE: 97.4 F | OXYGEN SATURATION: 93 % | RESPIRATION RATE: 18 BRPM | HEART RATE: 55 BPM | SYSTOLIC BLOOD PRESSURE: 124 MMHG | WEIGHT: 167.77 LBS | HEIGHT: 70 IN | BODY MASS INDEX: 24.02 KG/M2 | DIASTOLIC BLOOD PRESSURE: 74 MMHG

## 2025-01-06 DIAGNOSIS — K42.9 UMBILICAL HERNIA WITHOUT OBSTRUCTION AND WITHOUT GANGRENE: ICD-10-CM

## 2025-01-06 LAB
ANION GAP SERPL CALCULATED.3IONS-SCNC: 6 MMOL/L (ref 4–13)
BUN SERPL-MCNC: 23 MG/DL (ref 5–25)
CALCIUM SERPL-MCNC: 9.9 MG/DL (ref 8.4–10.2)
CHLORIDE SERPL-SCNC: 105 MMOL/L (ref 96–108)
CO2 SERPL-SCNC: 28 MMOL/L (ref 21–32)
CREAT SERPL-MCNC: 0.96 MG/DL (ref 0.6–1.3)
GFR SERPL CREATININE-BSD FRML MDRD: 86 ML/MIN/1.73SQ M
GLUCOSE P FAST SERPL-MCNC: 93 MG/DL (ref 65–99)
GLUCOSE SERPL-MCNC: 93 MG/DL (ref 65–140)
POTASSIUM SERPL-SCNC: 3.9 MMOL/L (ref 3.5–5.3)
SODIUM SERPL-SCNC: 139 MMOL/L (ref 135–147)

## 2025-01-06 PROCEDURE — 49591 RPR AA HRN 1ST < 3 CM RDC: CPT | Performed by: SURGERY

## 2025-01-06 PROCEDURE — NC001 PR NO CHARGE: Performed by: SURGERY

## 2025-01-06 PROCEDURE — 80048 BASIC METABOLIC PNL TOTAL CA: CPT | Performed by: PHYSICIAN ASSISTANT

## 2025-01-06 PROCEDURE — 88302 TISSUE EXAM BY PATHOLOGIST: CPT | Performed by: PATHOLOGY

## 2025-01-06 RX ORDER — PROMETHAZINE HYDROCHLORIDE 25 MG/ML
12.5 INJECTION, SOLUTION INTRAMUSCULAR; INTRAVENOUS ONCE AS NEEDED
Status: DISCONTINUED | OUTPATIENT
Start: 2025-01-06 | End: 2025-01-06 | Stop reason: HOSPADM

## 2025-01-06 RX ORDER — OXYCODONE HYDROCHLORIDE 5 MG/1
5 TABLET ORAL EVERY 4 HOURS PRN
Qty: 12 TABLET | Refills: 0 | Status: SHIPPED | OUTPATIENT
Start: 2025-01-06 | End: 2025-01-16

## 2025-01-06 RX ORDER — EPHEDRINE SULFATE 50 MG/ML
INJECTION INTRAVENOUS AS NEEDED
Status: DISCONTINUED | OUTPATIENT
Start: 2025-01-06 | End: 2025-01-06

## 2025-01-06 RX ORDER — LIDOCAINE HYDROCHLORIDE 20 MG/ML
INJECTION, SOLUTION EPIDURAL; INFILTRATION; INTRACAUDAL; PERINEURAL AS NEEDED
Status: DISCONTINUED | OUTPATIENT
Start: 2025-01-06 | End: 2025-01-06

## 2025-01-06 RX ORDER — CEFAZOLIN SODIUM 1 G/50ML
1000 SOLUTION INTRAVENOUS ONCE
Status: COMPLETED | OUTPATIENT
Start: 2025-01-06 | End: 2025-01-06

## 2025-01-06 RX ORDER — DEXAMETHASONE SODIUM PHOSPHATE 10 MG/ML
INJECTION, SOLUTION INTRAMUSCULAR; INTRAVENOUS AS NEEDED
Status: DISCONTINUED | OUTPATIENT
Start: 2025-01-06 | End: 2025-01-06

## 2025-01-06 RX ORDER — BUPIVACAINE HYDROCHLORIDE AND EPINEPHRINE 2.5; 5 MG/ML; UG/ML
INJECTION, SOLUTION EPIDURAL; INFILTRATION; INTRACAUDAL; PERINEURAL AS NEEDED
Status: DISCONTINUED | OUTPATIENT
Start: 2025-01-06 | End: 2025-01-06 | Stop reason: HOSPADM

## 2025-01-06 RX ORDER — KETOROLAC TROMETHAMINE 30 MG/ML
INJECTION, SOLUTION INTRAMUSCULAR; INTRAVENOUS AS NEEDED
Status: DISCONTINUED | OUTPATIENT
Start: 2025-01-06 | End: 2025-01-06

## 2025-01-06 RX ORDER — OXYCODONE HYDROCHLORIDE 5 MG/1
5 TABLET ORAL EVERY 4 HOURS PRN
Status: DISCONTINUED | OUTPATIENT
Start: 2025-01-06 | End: 2025-01-06 | Stop reason: HOSPADM

## 2025-01-06 RX ORDER — ONDANSETRON 2 MG/ML
4 INJECTION INTRAMUSCULAR; INTRAVENOUS ONCE AS NEEDED
Status: DISCONTINUED | OUTPATIENT
Start: 2025-01-06 | End: 2025-01-06 | Stop reason: HOSPADM

## 2025-01-06 RX ORDER — ALBUTEROL SULFATE 0.83 MG/ML
2.5 SOLUTION RESPIRATORY (INHALATION) ONCE AS NEEDED
Status: DISCONTINUED | OUTPATIENT
Start: 2025-01-06 | End: 2025-01-06 | Stop reason: HOSPADM

## 2025-01-06 RX ORDER — FENTANYL CITRATE/PF 50 MCG/ML
25 SYRINGE (ML) INJECTION
Status: DISCONTINUED | OUTPATIENT
Start: 2025-01-06 | End: 2025-01-06 | Stop reason: HOSPADM

## 2025-01-06 RX ORDER — FENTANYL CITRATE 50 UG/ML
INJECTION, SOLUTION INTRAMUSCULAR; INTRAVENOUS AS NEEDED
Status: DISCONTINUED | OUTPATIENT
Start: 2025-01-06 | End: 2025-01-06

## 2025-01-06 RX ORDER — SODIUM CHLORIDE, SODIUM LACTATE, POTASSIUM CHLORIDE, CALCIUM CHLORIDE 600; 310; 30; 20 MG/100ML; MG/100ML; MG/100ML; MG/100ML
125 INJECTION, SOLUTION INTRAVENOUS CONTINUOUS
Status: DISCONTINUED | OUTPATIENT
Start: 2025-01-06 | End: 2025-01-06 | Stop reason: HOSPADM

## 2025-01-06 RX ORDER — MIDAZOLAM HYDROCHLORIDE 2 MG/2ML
INJECTION, SOLUTION INTRAMUSCULAR; INTRAVENOUS AS NEEDED
Status: DISCONTINUED | OUTPATIENT
Start: 2025-01-06 | End: 2025-01-06

## 2025-01-06 RX ORDER — ONDANSETRON 2 MG/ML
INJECTION INTRAMUSCULAR; INTRAVENOUS AS NEEDED
Status: DISCONTINUED | OUTPATIENT
Start: 2025-01-06 | End: 2025-01-06

## 2025-01-06 RX ORDER — ROCURONIUM BROMIDE 10 MG/ML
INJECTION, SOLUTION INTRAVENOUS AS NEEDED
Status: DISCONTINUED | OUTPATIENT
Start: 2025-01-06 | End: 2025-01-06

## 2025-01-06 RX ORDER — MAGNESIUM HYDROXIDE 1200 MG/15ML
LIQUID ORAL AS NEEDED
Status: DISCONTINUED | OUTPATIENT
Start: 2025-01-06 | End: 2025-01-06 | Stop reason: HOSPADM

## 2025-01-06 RX ORDER — HYDROMORPHONE HCL/PF 1 MG/ML
0.5 SYRINGE (ML) INJECTION
Status: DISCONTINUED | OUTPATIENT
Start: 2025-01-06 | End: 2025-01-06 | Stop reason: HOSPADM

## 2025-01-06 RX ORDER — ACETAMINOPHEN 325 MG/1
650 TABLET ORAL EVERY 6 HOURS PRN
Status: DISCONTINUED | OUTPATIENT
Start: 2025-01-06 | End: 2025-01-06 | Stop reason: HOSPADM

## 2025-01-06 RX ORDER — PROPOFOL 10 MG/ML
INJECTION, EMULSION INTRAVENOUS AS NEEDED
Status: DISCONTINUED | OUTPATIENT
Start: 2025-01-06 | End: 2025-01-06

## 2025-01-06 RX ADMIN — ONDANSETRON 4 MG: 2 INJECTION INTRAMUSCULAR; INTRAVENOUS at 12:19

## 2025-01-06 RX ADMIN — ACETAMINOPHEN 650 MG: 325 TABLET, FILM COATED ORAL at 14:25

## 2025-01-06 RX ADMIN — ROCURONIUM 50 MG: 50 INJECTION, SOLUTION INTRAVENOUS at 12:19

## 2025-01-06 RX ADMIN — DEXAMETHASONE SODIUM PHOSPHATE 10 MG: 10 INJECTION INTRAMUSCULAR; INTRAVENOUS at 12:19

## 2025-01-06 RX ADMIN — PROPOFOL 200 MG: 10 INJECTION, EMULSION INTRAVENOUS at 12:19

## 2025-01-06 RX ADMIN — EPHEDRINE SULFATE 5 MG: 50 INJECTION INTRAVENOUS at 12:36

## 2025-01-06 RX ADMIN — KETOROLAC TROMETHAMINE 30 MG: 30 INJECTION, SOLUTION INTRAMUSCULAR; INTRAVENOUS at 12:51

## 2025-01-06 RX ADMIN — LIDOCAINE HYDROCHLORIDE 100 MG: 20 INJECTION, SOLUTION EPIDURAL; INFILTRATION; INTRACAUDAL at 12:19

## 2025-01-06 RX ADMIN — CEFAZOLIN SODIUM 1000 MG: 1 SOLUTION INTRAVENOUS at 12:10

## 2025-01-06 RX ADMIN — SUGAMMADEX 150 MG: 100 INJECTION, SOLUTION INTRAVENOUS at 12:51

## 2025-01-06 RX ADMIN — SODIUM CHLORIDE, SODIUM LACTATE, POTASSIUM CHLORIDE, AND CALCIUM CHLORIDE 125 ML/HR: .6; .31; .03; .02 INJECTION, SOLUTION INTRAVENOUS at 09:49

## 2025-01-06 RX ADMIN — MIDAZOLAM HYDROCHLORIDE 2 MG: 1 INJECTION, SOLUTION INTRAMUSCULAR; INTRAVENOUS at 12:15

## 2025-01-06 RX ADMIN — FENTANYL CITRATE 50 MCG: 50 INJECTION INTRAMUSCULAR; INTRAVENOUS at 12:56

## 2025-01-06 RX ADMIN — FENTANYL CITRATE 50 MCG: 50 INJECTION INTRAMUSCULAR; INTRAVENOUS at 12:19

## 2025-01-06 NOTE — H&P
History & Physical    Marike Robert    59 y.o.  male  [id number]  Surgeon:Rod Yuen MD  Date: January 6, 2025    Assessment:  Patient Active Problem List   Diagnosis    Umbilical hernia without obstruction and without gangrene    HLD (hyperlipidemia)    Grade II hemorrhoids    Dyspepsia    History of Lyme disease    Hypertension    Arthralgia    Insomnia    Nephrolithiasis    Paroxysmal atrial tachycardia (HCC)    Sinus pause    s/p atrial tachycardia ablation 2/16/2024    Bilateral inguinal hernia without obstruction or gangrene    TAYE (obstructive sleep apnea)    Essential hypertension    Sleep related hypoxia    Snoring    Witnessed episode of apnea     Plan:  Markie Robert is scheduled for umbilical hernia repair    HPI    Historical Information   Past Medical History:   Diagnosis Date    CPAP (continuous positive airway pressure) dependence     Hyperlipidemia     Hypertension     Kidney stone     s/p atrial tachycardia ablation 2/16/2024 02/17/2024    Sleep apnea      Past Surgical History:   Procedure Laterality Date    CARDIAC ELECTROPHYSIOLOGY PROCEDURE N/A 2/16/2024    Procedure: Cardiac eps/svt ablation;  Surgeon: Minor Vargas MD;  Location:  CARDIAC CATH LAB;  Service: Cardiology    HEMORRHOID SURGERY      HERNIA REPAIR      IA COLONOSCOPY FLX DX W/COLLJ SPEC WHEN PFRMD N/A 10/2/2018    Procedure: COLONOSCOPY;  Surgeon: Mo St MD;  Location: [facility] GI LAB;  Service: Gastroenterology    IA CYSTO/URETERO W/LITHOTRIPSY &INDWELL STENT INSRT Right 9/26/2023    Procedure: CYSTOSCOPY URETEROSCOPY WITH LITHOTRIPSY HOLMIUM LASER, AND INSERTION STENT URETERAL;  Surgeon: Shad Kenyon MD;  Location: [facility] OR;  Service: Urology     Social History   Social History     Substance and Sexual Activity   Alcohol Use Yes    Comment: 1 or 2 drinks once or twice a month     Social History     Substance and Sexual Activity   Drug Use No     Social History     Tobacco Use   Smoking Status Never   Smokeless  Tobacco Never     Family History   Problem Relation Age of Onset    Hypertension Mother     Hyperlipidemia Mother     Hyperlipidemia Father     No Known Problems Brother         Meds/Allergies   No Known Allergies    Current Facility-Administered Medications:     ceFAZolin (ANCEF) IVPB (premix in dextrose) 1,000 mg 50 mL, 1,000 mg, Intravenous, Once, Farzaneh Sommer PA-C    lactated ringers infusion, 125 mL/hr, Intravenous, Continuous, Farzaneh Sommer PA-C, Last Rate: 125 mL/hr at 01/06/25 0949, 125 mL/hr at 01/06/25 0949    Review of Systems    Vitals:    01/06/25 0900   BP: 137/84   Pulse: 65   Resp: 16   Temp: (!) 97 °F (36.1 °C)   SpO2: 97%     Physical Exam  GEN: NAD, A+OX3   HEENT: Normocephalic, atraumatic,   NECK: Supple, trachea midline,   CARDIAC: regular rate & rhythm, S1 & S2 normal.   LUNGS: Clear to auscultation, No Wheeze, Rales, or Rhonchi  ABDOMEN: Umbilical hernia  EXTREMITIES: No evidence of cyanosis, clubbing or edema. Pulses +2 B/L LE  NEURO: CN II-XII intact grossly, No sensory or motor deficits    Lab Results:   Imaging:   EKG, Pathology, and Other Studies:   Lab Results   Component Value Date    CALCIUM 9.9 01/06/2025    K 3.9 01/06/2025    CO2 28 01/06/2025     01/06/2025    BUN 23 01/06/2025    CREATININE 0.96 01/06/2025     Lab Results   Component Value Date    WBC 5.6 12/26/2024    HGB 15.3 12/26/2024    HCT 44.5 12/26/2024    MCV 92 12/26/2024     12/26/2024     Lab Results   Component Value Date    ALT 27 12/26/2024    AST 21 12/26/2024    ALKPHOS 51 12/26/2024

## 2025-01-06 NOTE — ANESTHESIA POSTPROCEDURE EVALUATION
Post-Op Assessment Note    CV Status:  Stable    Pain management: adequate       Mental Status:  Alert     Post Op Vitals Reviewed: Yes    No anethesia notable event occurred.    Staff: CRNA           Last Filed PACU Vitals:  Vitals Value Taken Time   Temp     Pulse     BP     Resp     SpO2

## 2025-01-06 NOTE — DISCHARGE INSTR - AVS FIRST PAGE
[facility] General [facility]     Post-Operative Care Instructions     Dr. Rod Yuen MD, [facility]     [phone]          1. General: You will feel pulling sensations around the wound or funny aches and pains around the incisions. This is normal. Even minor surgery is a change in your body and this is your body’s way of reacting to it. If you have had abdominal surgery, it may help to support the incision with a small pillow or blanket for comfort when moving or coughing.     2. Wound care:  Okay to shower.  The glue will fall off over the next week or 2.   Use ice for at least the 1st 48 hours.  Do not use for longer than 20 minutes at a time. Use 3 times per day.     3. Water: You may shower over the wounds. Do not bathe or use a pool or hot tub until cleared by the physician.   If you were discharged with a drain, make sure drain site is covered with plastic wrap before showering.      4. Activity: You may go up and down stairs, walk as much as you are comfortable, but walk at least 3 times each day. If you have had abdominal surgery, do not lift anything heavier than 20 pounds for at least 4 weeks.      5. Diet: You may resume a regular diet. If you had a same-day surgery or overnight stay surgery, you may wish to eat lightly for a few days: soups, crackers, and sandwiches. You may resume a regular diet when ready.      6. Medications: Resume all of your previous medications, unless told otherwise by the doctor. Avoid aspirin products for 2-3 days after the date of surgery. You may, at that time, began to take them again. Use Tylenol and Ibuprofen for pain control.  You may alternate these medications every 3 hours.  For example: you may take Tylenol at noon, Ibuprofen at 3:00 p.m., and Tylenol again at 6:00 p.m., etc. You should use ice to assist with pain control as above.  You do not need to take narcotic pain medication unless you are having significant pain.   If you were prescribed a  narcotic pain medication containing Tylenol, such as Percocet or Norco, do not use supplemental Tylenol.      7. Driving: You will need someone to drive you home on the day of surgery or discharge. Do not drive or make any important decisions while on narcotic pain medication or 24 hours and after anesthesia or sedation for surgery. Generally, you may drive when your off all narcotic pain medications and you are comfortable.      8. Upset Stomach: You may take Maalox, Tums, or similar items for an upset stomach. If your narcotic pain medication causes an upset stomach, do not take it on an empty stomach. Try taking it with at least some crackers or toast.      9. Constipation: Patients often experience constipation after surgery. You may take over-the-counter medication for this, such as Metamucil, Senokot, Dulcolax, milk of magnesia, etc. You may take a suppository unless you have had anorectal surgery such as a procedure on your hemorrhoids. If you experience significant nausea or vomiting after abdominal surgery, call the office before trying any of these medications.     10. Call the office: If you are experiencing any of the following: fevers above 101.5°, significant nausea or vomiting, if the wound develops drainage and/or there is excessive redness around the wound, or if you have significant diarrhea or other worsening symptoms.     11. Pain: You may be given a prescription for pain medication.  This will be sent to your pharmacy prior to discharge.     12. Sexual Activity: You may resume sexual activity when you feel ready and comfortable and your incision is sealed and healed without apparent infection risk.     13. Urination: If you have not urinated in 6 hours, go directly to the ER for evaluation for urinary retention.      14. Follow-up in 2 weeks.          **READ ONLY IF YOU HAVE BEEN DISCHARGED WITH A URINARY CATHETER**    Felder Insertion for Post-Op Urinary Retention        - A prescription for  Flomax will be sent to your pharmacy.  This should be taken daily while the urinary catheter remains in place.    You will not be given a prescription for Flomax if your prostate has been removed.  If you are already taking Flomax, continue the medication as prescribed.     - We will send a message to the urology group who will contact you within the next 48 hours with further instructions and to schedule an appointment for voiding trial and catheter removal.  The urinary catheter will remain in place for approximately 1 week.  If you are not contacted within the next 48 hours please call our office to assist with scheduling your follow-up.     - If you have your own urologist, you should contact your physician the day after discharge for instructions and to schedule a voiding trial and catheter removal.

## 2025-01-06 NOTE — OP NOTE
How Severe Are Your Bumps?: moderate OPERATIVE REPORT  PATIENT NAME: Markie Robert    :  1965  MRN: [id number]  Pt Location: [facility] OR ROOM 03    SURGERY DATE: 2025    Surgeons and Role:     * Rod Yuen MD - Primary  Mauricio Tubbs PGY 2  Preop Diagnosis:  Umbilical hernia without obstruction and without gangrene [K42.9]    Post-Op Diagnosis Codes:     * Umbilical hernia without obstruction and without gangrene [K42.9]    Procedure(s):  OPEN REPAIR HERNIA UMBILICAL    Specimen(s):  ID Type Source Tests Collected by Time Destination   1 : Umbilical Hernia Sac Tissue Hernia Sac, Umbilical TISSUE EXAM Rod Yuen MD 2025 1251        Estimated Blood Loss:   Minimal    Drains:  Ureteral Internal Stent Right ureter 6 Fr. (Active)   Number of days: 468       Anesthesia Type:   General    Operative Indications:  Umbilical hernia without obstruction and without gangrene [K42.9]      Operative Findings:    Prior to opening the fascia, or reducing the contents of the hernia, the hernia defect was measured. The defect measured 1 cm by 1 cm. This was repaired as described in the body of the report below.      Complications:   None    Procedure and Technique:  The patient was seen in the Holding Room. The risks, benefits, complications, treatment options, and expected outcomes were discussed with the patient. The possibilities of reaction to medication, pulmonary aspiration, perforation of viscus, bleeding, recurrent infection, the need for additional procedures, failure to diagnose a condition, and creating a complication requiring transfusion or operation were discussed with the patient. The patient concurred with the proposed plan, giving informed consent.  The site of surgery properly noted/marked. The patient was taken to Operating Room, identified as Markie Robert and staff verified patient name, , and procedure.   A Time Out was held and the above information confirmed.    The patient was placed supine.  After establishing general  Have Your Bumps Been Treated?: not been treated anesthesia, the abdomen was prepped and draped in standard fashion. Local anesthesia was used at the incision. An infraumbilical incision was made.  Dissection was carried down to the hernia sac located above the fascia and was mobilized from surrounding structures. The defect was quite small and deemed inappropriate for mesh placement.  Intact fascia was identified circumferentially around the defect. Adhesions anterior and posterior to the fascia were lysed using cautery and/or blunt dissection. The Fascia was closed with interrupted 2-0 Prolene sutures.  The umbilicus was re-created using 3-0 Vicryl sutures.The soft tissue was irrigated and closed in layers,using Vicryl sutures.  Hemostasis was confirmed.  The skin incision was closed in layers with a 4-0 Monocryl subcuticular closure.  Histocryl glue was used.  Instrument, sponge, and needle counts were correct prior to closure and at the conclusion of the case.     This text is generated with voice recognition software. There may be translation, syntax,  or grammatical errors. If you have any questions, please contact the dictating provider.      I was present for the entire procedure.    Patient Disposition:  PACU              SIGNATURE: Rod Yuen MD  DATE: January 6, 2025  TIME: 12:57 PM                 Is This A New Presentation, Or A Follow-Up?: Bump

## 2025-01-06 NOTE — ANESTHESIA PREPROCEDURE EVALUATION
Procedure:  REPAIR HERNIA UMBILICAL open w/ psb mesh (Abdomen)    Relevant Problems   CARDIO   (+) Essential hypertension   (+) Grade II hemorrhoids   (+) HLD (hyperlipidemia)   (+) Hypertension   (+) Paroxysmal atrial tachycardia (HCC)   (+) Sinus pause      /RENAL   (+) Nephrolithiasis      PULMONARY   (+) TAYE (obstructive sleep apnea)      Surgery/Wound/Pain   (+) s/p atrial tachycardia ablation 2/16/2024      Other   (+) Dyspepsia   (+) Umbilical hernia without obstruction and without gangrene        Physical Exam    Airway    Mallampati score: II  TM Distance: >3 FB  Neck ROM: full     Dental   No notable dental hx     Cardiovascular  Rhythm: regular, Rate: normal, Cardiovascular exam normal    Pulmonary  Pulmonary exam normal Breath sounds clear to auscultation    Other Findings  Per pt denies anything remaining that is loose or removeable      Anesthesia Plan  ASA Score- 3     Anesthesia Type- general with ASA Monitors.         Additional Monitors:     Airway Plan: ETT.    Comment: No ST deviation is noted. There were no arrhythmias during stress. There were no arrhythmias during recovery. . The ECG was negative for ischemia..       Plan Factors-Exercise tolerance (METS): >4 METS.    Chart reviewed. EKG reviewed.  Existing labs reviewed. Patient summary reviewed.    Patient is not a current smoker.      Obstructive sleep apnea risk education given perioperatively.        Induction- intravenous.    Postoperative Plan- Plan for postoperative opioid use.         Informed Consent- Anesthetic plan and risks discussed with patient.  I personally reviewed this patient with the CRNA. Discussed and agreed on the Anesthesia Plan with the CRNA..

## 2025-01-08 PROCEDURE — 88302 TISSUE EXAM BY PATHOLOGIST: CPT | Performed by: PATHOLOGY

## 2025-01-10 NOTE — ANESTHESIA POSTPROCEDURE EVALUATION
Post-Op Assessment Note    Last Filed PACU Vitals:  Vitals Value Taken Time   Temp 97.3 °F (36.3 °C) 01/06/25 1340   Pulse 60 01/06/25 1348   /82 01/06/25 1340   Resp 15 01/06/25 1348   SpO2 95 % 01/06/25 1348   Vitals shown include unfiled device data.    Modified Everette:     Vitals Value Taken Time   Activity 2 01/06/25 1340   Respiration 2 01/06/25 1340   Circulation 2 01/06/25 1340   Consciousness 2 01/06/25 1340   Oxygen Saturation 2 01/06/25 1340     Modified Everette Score: 10

## 2025-01-19 DIAGNOSIS — E78.5 HYPERLIPIDEMIA, UNSPECIFIED HYPERLIPIDEMIA TYPE: ICD-10-CM

## 2025-01-20 RX ORDER — SIMVASTATIN 20 MG
TABLET ORAL
Qty: 90 TABLET | Refills: 1 | Status: SHIPPED | OUTPATIENT
Start: 2025-01-20

## 2025-01-21 ENCOUNTER — OFFICE VISIT (OUTPATIENT)
Dept: SURGERY | Facility: CLINIC | Age: 60
End: 2025-01-21
Payer: COMMERCIAL

## 2025-01-21 VITALS
TEMPERATURE: 98.2 F | OXYGEN SATURATION: 97 % | WEIGHT: 167 LBS | HEART RATE: 80 BPM | RESPIRATION RATE: 15 BRPM | SYSTOLIC BLOOD PRESSURE: 118 MMHG | HEIGHT: 70 IN | BODY MASS INDEX: 23.91 KG/M2 | DIASTOLIC BLOOD PRESSURE: 76 MMHG

## 2025-01-21 DIAGNOSIS — Z09 S/P UMBILICAL HERNIA REPAIR, FOLLOW-UP EXAM: Primary | ICD-10-CM

## 2025-01-21 PROCEDURE — 99211 OFF/OP EST MAY X REQ PHY/QHP: CPT | Performed by: PHYSICIAN ASSISTANT

## 2025-01-21 NOTE — LETTER
January 21, 2025     Patient: Markie Robert  YOB: 1965  Date of Visit: 1/21/2025      To Whom it May Concern:    Markie Robert is under my professional care. Markie was seen in my office on 1/21/2025. Markie may return to work on February 17th 2025 with no restrictions .    If you have any questions or concerns, please don't hesitate to call.         Sincerely,          Farzaneh Sommer PA-C        CC: No Recipients

## 2025-01-21 NOTE — PROGRESS NOTES
Assessment/Plan:   Markie Robert is a 59 y.o.male who comes in today for postoperative check after : primary open umbilical hernia with Dr. Yuen at 1/6/25 with Dr. Yuen.     Pathology: Reviewed with patient, all questions answered.   Final Diagnosis   A. Hernia Sac, Umbilical, Umbilical Hernia Sac:  Mature fibroadipose / fibromembranous tissue and focal granulation tissue with calcification         Patient is very pleased with the outcome of their surgery.     Postoperative restrictions reviewed, including specific lifting and exercise restrictions. All questions answered.       ______________________________________________________  HPI:  Markie Robert is a 59 y.o.male who comes in today for postoperative check after recent primary open umbilical hernia with Dr. Yuen at 1/6/25 with Dr. Yuen.     Currently doing well without problems, no fever or chills,no nausea and no vomiting. No chest pain or trouble breathing.     Reports no issues.    ROS:  General ROS: negative for - chills, fatigue, fever or night sweats, weight loss  Respiratory ROS: no cough, shortness of breath, or wheezing  Cardiovascular ROS: no chest pain or dyspnea on exertion  Genito-Urinary ROS: no dysuria, trouble voiding, or hematuria  Musculoskeletal ROS: negative for - gait disturbance, joint pain or muscle pain  Neurological ROS: no TIA or stroke symptoms  GI ROS: see HPI  Skin ROS: no new rashes or lesions   Lymphatic ROS: no new adenopathy noted by pt.   GYN ROS: see HPI, no new GYN history or bleeding noted  Psy ROS: no new mental or behavioral disturbances       Patient Active Problem List   Diagnosis    Umbilical hernia without obstruction and without gangrene    HLD (hyperlipidemia)    Grade II hemorrhoids    Dyspepsia    History of Lyme disease    Hypertension    Arthralgia    Insomnia    Nephrolithiasis    Paroxysmal atrial tachycardia (HCC)    Sinus pause    s/p atrial tachycardia ablation 2/16/2024    Bilateral inguinal  hernia without obstruction or gangrene    TAYE (obstructive sleep apnea)    Essential hypertension    Sleep related hypoxia    Snoring    Witnessed episode of apnea         Patient has no known allergies.      Current Outpatient Medications:     amLODIPine (NORVASC) 5 mg tablet, TAKE ONE TABLET BY MOUTH EVERY DAY, Disp: 90 tablet, Rfl: 1    simvastatin (ZOCOR) 20 mg tablet, take one tablet (20mg total) by mouth daily at bedtime, Disp: 90 tablet, Rfl: 1    valsartan (DIOVAN) 80 mg tablet, Take 1 tablet (80 mg total) by mouth daily, Disp: , Rfl:     Past Medical History:   Diagnosis Date    CPAP (continuous positive airway pressure) dependence     Hyperlipidemia     Hypertension     Kidney stone     s/p atrial tachycardia ablation 2/16/2024 02/17/2024    Sleep apnea        Past Surgical History:   Procedure Laterality Date    CARDIAC ELECTROPHYSIOLOGY PROCEDURE N/A 2/16/2024    Procedure: Cardiac eps/svt ablation;  Surgeon: Minor Vargas MD;  Location:  CARDIAC CATH LAB;  Service: Cardiology    HEMORRHOID SURGERY      HERNIA REPAIR      NH COLONOSCOPY FLX DX W/COLLJ SPEC WHEN PFRMD N/A 10/2/2018    Procedure: COLONOSCOPY;  Surgeon: Mo St MD;  Location: [facility] GI LAB;  Service: Gastroenterology    NH CYSTO/URETERO W/LITHOTRIPSY &INDWELL STENT INSRT Right 9/26/2023    Procedure: CYSTOSCOPY URETEROSCOPY WITH LITHOTRIPSY HOLMIUM LASER, AND INSERTION STENT URETERAL;  Surgeon: Shad Kenyon MD;  Location: [facility] OR;  Service: Urology    NH RPR AA HERNIA 1ST < 3 CM REDUCIBLE N/A 1/6/2025    Procedure: OPEN REPAIR HERNIA UMBILICAL;  Surgeon: Rod Yuen MD;  Location: [facility] OR;  Service: General       Family History   Problem Relation Age of Onset    Hypertension Mother     Hyperlipidemia Mother     Hyperlipidemia Father     No Known Problems Brother         reports that he has never smoked. He has never used smokeless tobacco. He reports current alcohol use. He reports that he does not use drugs.    Vitals:     01/21/25 0852   BP: 118/76   Pulse: 80   Resp: 15   Temp: 98.2 °F (36.8 °C)   SpO2: 97%       PHYSICAL EXAM  General: normal, cooperative, no distress  Abdominal: soft, nondistended, or nontender  Incision: clean, dry, and intact and healing well      Farzaneh Sommer PA-C    Date: 1/21/2025 Time: 8:57 AM

## 2025-02-03 ENCOUNTER — TELEPHONE (OUTPATIENT)
Age: 60
End: 2025-02-03

## 2025-02-03 DIAGNOSIS — I10 HYPERTENSION, UNSPECIFIED TYPE: ICD-10-CM

## 2025-02-03 RX ORDER — AMLODIPINE BESYLATE 5 MG/1
5 TABLET ORAL DAILY
Qty: 90 TABLET | Refills: 1 | Status: SHIPPED | OUTPATIENT
Start: 2025-02-03

## 2025-02-03 NOTE — TELEPHONE ENCOUNTER
Advised patient paper work is filled out and awaiting signature .  Provider will sign tomorrow 02/04 and will be faxed first thing in the morning.

## 2025-02-03 NOTE — TELEPHONE ENCOUNTER
Patient called in to see if his forms were sent to his workplace, he said he emailed Dulce Bravo the form last week.  Please let him know if it has been taken care of.  Thanks.

## 2025-02-27 ENCOUNTER — OFFICE VISIT (OUTPATIENT)
Dept: FAMILY MEDICINE CLINIC | Facility: CLINIC | Age: 60
End: 2025-02-27
Payer: COMMERCIAL

## 2025-02-27 VITALS
HEIGHT: 70 IN | BODY MASS INDEX: 24.34 KG/M2 | DIASTOLIC BLOOD PRESSURE: 80 MMHG | SYSTOLIC BLOOD PRESSURE: 118 MMHG | HEART RATE: 56 BPM | TEMPERATURE: 97.3 F | WEIGHT: 170 LBS | OXYGEN SATURATION: 95 %

## 2025-02-27 DIAGNOSIS — Z13.29 SCREENING FOR THYROID DISORDER: ICD-10-CM

## 2025-02-27 DIAGNOSIS — Z13.1 SCREENING FOR DIABETES MELLITUS: ICD-10-CM

## 2025-02-27 DIAGNOSIS — I10 PRIMARY HYPERTENSION: Primary | ICD-10-CM

## 2025-02-27 DIAGNOSIS — E78.2 MIXED HYPERLIPIDEMIA: ICD-10-CM

## 2025-02-27 DIAGNOSIS — I47.19 PAROXYSMAL ATRIAL TACHYCARDIA (HCC): ICD-10-CM

## 2025-02-27 DIAGNOSIS — Z13.0 SCREENING FOR DEFICIENCY ANEMIA: ICD-10-CM

## 2025-02-27 DIAGNOSIS — R53.83 FATIGUE, UNSPECIFIED TYPE: ICD-10-CM

## 2025-02-27 PROCEDURE — 99214 OFFICE O/P EST MOD 30 MIN: CPT

## 2025-02-27 NOTE — PROGRESS NOTES
Name: Markie Robert      : 1965      MRN: [id number]  Encounter Provider: Oneida Garza PA-C  Encounter Date: 2025   Encounter department: [facility]  :  Assessment & Plan  Primary hypertension  Blood pressure stable with current regimen, continue amlodipine 5 mg and valsartan 80 mg daily.  Continue to monitor blood pressure at home, reach out if he is getting elevated readings.  Follow-up in 6 months for annual physical with fasting labs prior.       Screening for deficiency anemia    Orders:  •  CBC and differential; Future    Screening for diabetes mellitus    Orders:  •  Comprehensive metabolic panel; Future    Screening for thyroid disorder    Orders:  •  TSH, 3rd generation with Free T4 reflex; Future    Fatigue, unspecified type  Requesting testosterone to be checked, has been low in the past.  Will send to urology if deficient.  Orders:  •  Testosterone, free, total; Future    Mixed hyperlipidemia  Due for lipid panel in August, continue simvastatin 20 mg.  Orders:  •  Lipid Panel with Direct LDL reflex; Future    Paroxysmal atrial tachycardia (HCC)  Doing well after ablation performed by cardiology.  Denies palpitations, lightheadedness or dizziness.              History of Present Illness   Presents today for blood pressure follow-up.  Is compliant with medications and reports home readings for blood pressure have been in good ranges.  No side effects from medications.  No palpitations, chest pain, shortness of breath, lightheadedness or dizziness.  Recently had umbilical hernia repaired and did well with procedure.  No concerns today.      Review of Systems   Constitutional:  Negative for chills, fatigue and fever.   Respiratory:  Negative for cough, chest tightness and shortness of breath.    Cardiovascular:  Negative for chest pain, palpitations and leg swelling.   Neurological:  Negative for dizziness, light-headedness and headaches.       Objective   /80  "(BP Location: Left arm, Patient Position: Sitting, Cuff Size: Standard)   Pulse 56   Temp (!) 97.3 °F (36.3 °C) (Tympanic)   Ht 5' 10\" (1.778 m)   Wt 77.1 kg (170 lb)   SpO2 95%   BMI 24.39 kg/m²      Physical Exam  Vitals and nursing note reviewed.   Constitutional:       General: He is not in acute distress.     Appearance: Normal appearance. He is normal weight. He is not ill-appearing.   HENT:      Head: Normocephalic and atraumatic.   Cardiovascular:      Rate and Rhythm: Normal rate and regular rhythm.      Pulses: Normal pulses.      Heart sounds: No murmur heard.  Pulmonary:      Effort: Pulmonary effort is normal. No respiratory distress.      Breath sounds: Normal breath sounds.   Neurological:      General: No focal deficit present.      Mental Status: He is alert and oriented to person, place, and time. Mental status is at baseline.   Psychiatric:         Mood and Affect: Mood normal.         Behavior: Behavior normal.         Judgment: Judgment normal.         "

## 2025-02-27 NOTE — ASSESSMENT & PLAN NOTE
Doing well after ablation performed by cardiology.  Denies palpitations, lightheadedness or dizziness.

## 2025-02-27 NOTE — ASSESSMENT & PLAN NOTE
Due for lipid panel in August, continue simvastatin 20 mg.  Orders:  •  Lipid Panel with Direct LDL reflex; Future

## 2025-02-27 NOTE — ASSESSMENT & PLAN NOTE
Blood pressure stable with current regimen, continue amlodipine 5 mg and valsartan 80 mg daily.  Continue to monitor blood pressure at home, reach out if he is getting elevated readings.  Follow-up in 6 months for annual physical with fasting labs prior.

## 2025-03-24 DIAGNOSIS — I10 HYPERTENSION, UNSPECIFIED TYPE: ICD-10-CM

## 2025-03-26 ENCOUNTER — TELEPHONE (OUTPATIENT)
Dept: CARDIOLOGY CLINIC | Facility: CLINIC | Age: 60
End: 2025-03-26

## 2025-03-26 RX ORDER — VALSARTAN 80 MG/1
80 TABLET ORAL 2 TIMES DAILY
Qty: 180 TABLET | Refills: 0 | Status: SHIPPED | OUTPATIENT
Start: 2025-03-26

## 2025-03-26 NOTE — TELEPHONE ENCOUNTER
Patient needs an appointment. Please contact the patient to schedule an appointment. Last office visit: 05.2024  90D refill provided

## 2025-04-02 ENCOUNTER — HOSPITAL ENCOUNTER (OUTPATIENT)
Dept: ULTRASOUND IMAGING | Facility: MEDICAL CENTER | Age: 60
Discharge: HOME | End: 2025-04-02
Payer: COMMERCIAL

## 2025-04-02 ENCOUNTER — APPOINTMENT (OUTPATIENT)
Dept: RADIOLOGY | Facility: MEDICAL CENTER | Age: 60
End: 2025-04-02
Payer: COMMERCIAL

## 2025-04-02 DIAGNOSIS — N20.0 NEPHROLITHIASIS: ICD-10-CM

## 2025-04-02 PROCEDURE — 76775 US EXAM ABDO BACK WALL LIM: CPT

## 2025-04-02 PROCEDURE — 74018 RADEX ABDOMEN 1 VIEW: CPT

## 2025-04-07 ENCOUNTER — HOSPITAL ENCOUNTER (OUTPATIENT)
Dept: CT IMAGING | Facility: HOSPITAL | Age: 60
Discharge: HOME | End: 2025-04-07
Payer: COMMERCIAL

## 2025-04-07 DIAGNOSIS — I77.819 AORTIC DILATATION (HCC): ICD-10-CM

## 2025-04-07 PROCEDURE — 71250 CT THORAX DX C-: CPT

## 2025-04-08 ENCOUNTER — OFFICE VISIT (OUTPATIENT)
Dept: UROLOGY | Facility: CLINIC | Age: 60
End: 2025-04-08
Payer: COMMERCIAL

## 2025-04-08 VITALS
BODY MASS INDEX: 23.53 KG/M2 | SYSTOLIC BLOOD PRESSURE: 120 MMHG | DIASTOLIC BLOOD PRESSURE: 70 MMHG | WEIGHT: 164 LBS | OXYGEN SATURATION: 98 % | HEART RATE: 67 BPM

## 2025-04-08 DIAGNOSIS — N20.0 NEPHROLITHIASIS: ICD-10-CM

## 2025-04-08 DIAGNOSIS — Z12.5 SCREENING FOR PROSTATE CANCER: Primary | ICD-10-CM

## 2025-04-08 PROCEDURE — 99213 OFFICE O/P EST LOW 20 MIN: CPT | Performed by: PHYSICIAN ASSISTANT

## 2025-04-08 NOTE — ASSESSMENT & PLAN NOTE
Acute right ureteral stone ureteroscopy in 2023-well recovered  Asymptomatic small renal calculi versus Julian plaques, no obstruction; favor observation  Continue dietary goals 80 ounce fluid intake daily and low-sodium diet.  Creatinine 0.9  Calcium 9.2  Orders:    XR abdomen 1 view kub; Future    US kidney and bladder; Future

## 2025-04-08 NOTE — PROGRESS NOTES
Name: Markie Robert      : 1965      MRN: [id number]  Encounter Provider: Amalia Garcia PA-C  Encounter Date: 2025   Encounter department: [facility] UROLOGY [place] END  :  Assessment & Plan  Screening for prostate cancer  PSA 0.4-2024 HNL care everywhere       Nephrolithiasis  Acute right ureteral stone ureteroscopy in -well recovered  Asymptomatic small renal calculi versus Julian plaques, no obstruction; favor observation  Continue dietary goals 80 ounce fluid intake daily and low-sodium diet.  Creatinine 0.9  Calcium 9.2  Orders:    XR abdomen 1 view kub; Future    US kidney and bladder; Future        History of Present Illness   Markie Robert is a 59 y.o. male who presents annual follow-up nephrolithiasis well treated acute right ureteral stone in .  Julian's plaques were seen on right nephroscopy.  He has other left renal calculi which have been nonobstructive and asymptomatic.  Presents today with updated ultrasound and KUB stable bilateral lower pole calculi.  No hydronephrosis.  KUB measures maximum 4 mm.  Prostate not enlarged 17 cc. No voiding bother. Good erections.        Flowsheet Row Most Recent Value   AUA SYMPTOM SCORE    How often have you had a sensation of not emptying your bladder completely after you finished urinating? 0 (P)     How often have you had to urinate again less than two hours after you finished urinating? 0 (P)     How often have you found you stopped and started again several times when you urinate? 0 (P)     How often have you found it difficult to postpone urination? 0 (P)     How often have you had a weak urinary stream? 5 (P)     How often have you had to push or strain to begin urination? 0 (P)     How many times did you most typically get up to urinate from the time you went to bed at night until the time you got up in the morning? 2 (P)     Quality of Life: If you were to spend the rest of your life with your urinary condition  just the way it is now, how would you feel about that? 2 (P)     AUA SYMPTOM SCORE 7 (P)            Review of Systems   Constitutional: Negative.    Respiratory: Negative.     Cardiovascular: Negative.    Genitourinary:  Negative for decreased urine volume, difficulty urinating, dysuria, flank pain, frequency, hematuria, testicular pain and urgency.   Musculoskeletal: Negative.           Objective   /70 (BP Location: Left arm, Patient Position: Sitting, Cuff Size: Adult)   Pulse 67   Wt 74.4 kg (164 lb)   SpO2 98%   BMI 23.53 kg/m²     Physical Exam  Vitals and nursing note reviewed.   Constitutional:       General: He is not in acute distress.     Appearance: He is well-developed. He is not diaphoretic.   HENT:      Head: Normocephalic and atraumatic.   Pulmonary:      Effort: Pulmonary effort is normal.   Musculoskeletal:      Right lower leg: No edema.      Left lower leg: No edema.   Skin:     General: Skin is warm.   Neurological:      Mental Status: He is alert and oriented to person, place, and time.           Results   Lab Results   Component Value Date    PSA 0.3 12/09/2021    PSA 0.2 07/14/2018     Lab Results   Component Value Date    CALCIUM 9.9 01/06/2025    K 3.9 01/06/2025    CO2 28 01/06/2025     01/06/2025    BUN 23 01/06/2025    CREATININE 0.96 01/06/2025     Lab Results   Component Value Date    WBC 5.6 12/26/2024    HGB 15.3 12/26/2024    HCT 44.5 12/26/2024    MCV 92 12/26/2024     12/26/2024       Office Urine Dip  No results found for this or any previous visit (from the past hour).

## 2025-04-24 ENCOUNTER — OFFICE VISIT (OUTPATIENT)
Dept: SLEEP CENTER | Facility: CLINIC | Age: 60
End: 2025-04-24
Payer: COMMERCIAL

## 2025-04-24 VITALS
HEIGHT: 70 IN | OXYGEN SATURATION: 98 % | SYSTOLIC BLOOD PRESSURE: 117 MMHG | DIASTOLIC BLOOD PRESSURE: 86 MMHG | BODY MASS INDEX: 24.91 KG/M2 | WEIGHT: 174 LBS | HEART RATE: 94 BPM

## 2025-04-24 DIAGNOSIS — G47.33 OSA (OBSTRUCTIVE SLEEP APNEA): Primary | ICD-10-CM

## 2025-04-24 PROCEDURE — 99213 OFFICE O/P EST LOW 20 MIN: CPT

## 2025-04-24 NOTE — PROGRESS NOTES
[facility]  Sleep Medicine Follow Up/Established Patient    PATIENT NAME: Markie Robert  DATE OF SERVICE: April 24, 2025  DATE OF LAST VISIT: 10/24/2025    ASSESSMENT/PLAN:  Markie Robert is a 59 y.o. male with PMHx of TAYE on CPAP, HTN, SVT s/p ablation, HLD, GERD and h/o Lyme disease who returns to the office for follow up.    TAYE (obstructive sleep apnea)  - The patient has a history of mild TAYE diagnosed on HSAT in 2024 (HOMER 6.8, supine HOMER 12.4, O2 connor 70%, TST <90% 88.3 min) and is currently prescribed auto CPAP 5-8 cmH2O.  He denies any issues using his device at this time.  - Therapy and compliance data reviewed: residual AHI 2.4, compliance 77% and mask leak is elevated, but patient denies this being overly bothersome.  - Continue with APAP 5-8 cmH2O with a full-face mask.  - Refill of supplies will be sent to the patient's DME.  - Explained the importance of keeping the machine clean, and that they should be eligible for refills of supplies every 3-6 months depending on insurance.  We also discussed the insurance compliance requirements.  - Encouraged healthy lifestyle with adequate sleep (7-9 hours per night), healthy balanced diet and routine exercise.  Explained the importance of avoiding driving while drowsy.  - Follow-up in 1 year.  -     PAP DME Resupply/Reorder    ________________________________________________________________________________________________    Interval History: Markie Robert is a 59 y.o. male with PMHx of TAYE on CPAP, HTN, SVT s/p ablation, HLD, GERD and h/o Lyme disease who returns to the office for follow up.  Patient reports that he continues to use his CPAP regularly and has no issues with use.  He still has not noticed much of a difference in how he feels with PAP therapy, but is willing to continue using the device.  He denies any aerophagia, pressure intolerance, poor mask fit, mask leak or rainout.  He is getting routine supply refills from  his DME and is given the device clean on a regular basis.    PAP History  Sleep Apnea Type: TAYE  Most Recent AHI: 6.8 in 2024  Treatment: APAP     DME: [facility]    Current PAP Settings: 5-8 cmH2O  Compliance Data: 77%, see below    Mask Type: full-face  PAP Issues: none  Uses Chin Strap: No  Uses Ramp Function: Yes   Uses Humidity: Yes     There is not a perceived benefit by the patient: doesn't notice a difference  Observers report no longer has snoring with APAP use.    Prior History: The patient initially established with [facility] sleep medicine in 6/2024 at which time he had symptoms concerning for sleep apnea and plan was for HSAT.  Testing revealed mild TAYE (HOMER 6.8, supine HOMER 12.4, O2 connor 70%, TST <90% 88.3 min) and recommendation was for initiation of auto CPAP 5-15 cmH2O.  He was also recommended to have an overnight oximetry once on CPAP to assure resolution of his event related hypoxia noted on HSAT.  Overnight oximetry showed normalization of saturations with PAP therapy and on initial follow up he was narrowed to 5-8 cmH2O.  He is now returning for continued follow up.    ESS: Total score: (Patient-Rptd) (P) 3/24  Greater or equal to 10 is positive for excessive daytime sleepiness  Pertinent Meds: None    Sleep-Wake Schedule:  Bedtime: 0100/0200  Wake Time: 0830  Difficulty Falling Asleep: No  Avg Number of Awakenings: 1-2x a night  Cause of Awakenings: bathroom  Weekend Sleep Schedule: 4218-4231  Naps: denies    Avg TST per 24 hours: 7 hours    Past Treatments:  APAP 5-15, 5-8 cmH2O    Prior Sleep Studies:  HSAT -- 7/12/2024 (Wt 166.0 lbs)  HOMER - 6.8  Sup HOMER - 12.4  O2 Connor - 78.0%  TST < 90% - 88.3 min    Other Relevant Labs and Studies:  Therapy and Compliance Data -- 3/25/2025 - 4/23/2025      Past Medical History:   Diagnosis Date    CPAP (continuous positive airway pressure) dependence     Hyperlipidemia     Hypertension     Kidney stone     s/p atrial tachycardia ablation 2/16/2024 02/17/2024     Sleep apnea      Past Surgical History:   Procedure Laterality Date    CARDIAC ELECTROPHYSIOLOGY PROCEDURE N/A 2/16/2024    Procedure: Cardiac eps/svt ablation;  Surgeon: Minor Vargas MD;  Location:  CARDIAC CATH LAB;  Service: Cardiology    HEMORRHOID SURGERY      HERNIA REPAIR      DE COLONOSCOPY FLX DX W/COLLJ SPEC WHEN PFRMD N/A 10/2/2018    Procedure: COLONOSCOPY;  Surgeon: Mo St MD;  Location: [facility] GI LAB;  Service: Gastroenterology    DE CYSTO/URETERO W/LITHOTRIPSY &INDWELL STENT INSRT Right 9/26/2023    Procedure: CYSTOSCOPY URETEROSCOPY WITH LITHOTRIPSY HOLMIUM LASER, AND INSERTION STENT URETERAL;  Surgeon: Shad Kenyon MD;  Location: [facility];  Service: Urology    DE RPR AA HERNIA 1ST < 3 CM REDUCIBLE N/A 1/6/2025    Procedure: OPEN REPAIR HERNIA UMBILICAL;  Surgeon: Rod Yuen MD;  Location: [facility];  Service: General     Patient Active Problem List   Diagnosis    Screening for prostate cancer    Umbilical hernia without obstruction and without gangrene    HLD (hyperlipidemia)    Grade II hemorrhoids    Dyspepsia    History of Lyme disease    Hypertension    Arthralgia    Insomnia    Nephrolithiasis    Paroxysmal atrial tachycardia (HCC)    Sinus pause    s/p atrial tachycardia ablation 2/16/2024    Bilateral inguinal hernia without obstruction or gangrene    TAYE (obstructive sleep apnea)    Essential hypertension    Sleep related hypoxia    Snoring    Witnessed episode of apnea     Allergies as of 04/24/2025    (No Known Allergies)     REVIEW OF SYSTEMS:  Review of Systems  10-point system review completed, all of which are negative except as mentioned above.    CURRENT MEDICATIONS:  Current Outpatient Medications   Medication Instructions    amLODIPine (NORVASC) 5 mg, Oral, Daily    simvastatin (ZOCOR) 20 mg tablet take one tablet (20mg total) by mouth daily at bedtime    valsartan (DIOVAN) 80 mg, Oral, 2 times daily     SOCIAL HISTORY:  Social History     Tobacco Use     "Smoking status: Never    Smokeless tobacco: Never   Vaping Use    Vaping status: Never Used   Substance Use Topics    Alcohol use: Yes     Comment: 1 or 2 drinks once or twice a month    Drug use: No     FAMILY HISTORY:  Family History   Problem Relation Age of Onset    Hypertension Mother     Hyperlipidemia Mother     Hyperlipidemia Father     No Known Problems Brother      PHYSICAL EXAMINATION:  Vital Signs:  /86 (BP Location: Right arm, Patient Position: Sitting, Cuff Size: Standard)   Pulse 94   Ht 5' 10\" (1.778 m)   Wt 78.9 kg (174 lb)   SpO2 98%   BMI 24.97 kg/m²   Body mass index is 24.97 kg/m².  Constitutional: NAD, well appearing   Mental Status: AAOx3  Skin: Warm, dry, no rashes noted   Eyes: PERRL, normal conjunctiva  ENT: Nasal congestion absent, nasal valve incompetence absent.  Chest: RRR, +S1/S2, CTA B/L, no W/R/R, no M/R/G  Extremities: No digital clubbing or pedal edema      Indira Siddiqi MD  Pulmonary-Critical Care and Sleep Medicine  04/24/25    Portions of the record may have been created with voice recognition software. Occasional wrong word or \"sound a like\" substitutions may have occurred due to the inherent limitations of voice recognition software. Please read the chart carefully and recognize, using context, where substitutions have occurred.   "

## 2025-04-25 ENCOUNTER — TELEPHONE (OUTPATIENT)
Dept: SLEEP CENTER | Facility: CLINIC | Age: 60
End: 2025-04-25

## 2025-04-25 LAB

## 2025-05-08 PROBLEM — Z12.5 SCREENING FOR PROSTATE CANCER: Status: RESOLVED | Noted: 2018-07-03 | Resolved: 2025-05-08

## 2025-07-18 DIAGNOSIS — E78.5 HYPERLIPIDEMIA, UNSPECIFIED HYPERLIPIDEMIA TYPE: ICD-10-CM

## 2025-07-20 RX ORDER — SIMVASTATIN 20 MG
TABLET ORAL
Qty: 90 TABLET | Refills: 1 | Status: SHIPPED | OUTPATIENT
Start: 2025-07-20

## (undated) DEVICE — GLOVE SRG BIOGEL 6.5

## (undated) DEVICE — MEDI-VAC YANKAUER SUCTION HANDLE W/BULBOUS AND CONTROL VENT: Brand: CARDINAL HEALTH

## (undated) DEVICE — SCD SEQUENTIAL COMPRESSION COMFORT SLEEVE MEDIUM KNEE LENGTH: Brand: KENDALL SCD

## (undated) DEVICE — CATH EP  INQUIRY 6F 2-5-2MM  MED CRV STERABLE  DECAPOLAR 110CM

## (undated) DEVICE — TUBING SUCTION 5MM X 12 FT

## (undated) DEVICE — STERILE SURGICAL LUBRICANT,  TUBE: Brand: SURGILUBE

## (undated) DEVICE — REF PATCH ENSITE X

## (undated) DEVICE — GLOVE SRG BIOGEL ECLIPSE 7.5

## (undated) DEVICE — PINNACLE INTRODUCER SHEATH: Brand: PINNACLE

## (undated) DEVICE — WORKING LENGTH 235CM, WORKING CHANNEL 2.8MM: Brand: RESOLUTION 360 CLIP

## (undated) DEVICE — FIBER STD QUANTA 272 MICRON

## (undated) DEVICE — CATH EP 5FR QUAD COURNAND-2

## (undated) DEVICE — GUIDE SHEATH SRO 8.5 FR

## (undated) DEVICE — UROLOGIC DRAIN BAG: Brand: UNBRANDED

## (undated) DEVICE — CATH EP 5FR QUAD SUPREME JSN

## (undated) DEVICE — PREMIUM DRY TRAY LF: Brand: MEDLINE INDUSTRIES, INC.

## (undated) DEVICE — EXOFIN PRECISION PEN HIGH VISCOSITY TOPICAL SKIN ADHESIVE: Brand: EXOFIN PRECISION PEN, 1G

## (undated) DEVICE — SHEATH URETERAL ACCESS 10/12FR 35CM PROXIS

## (undated) DEVICE — SPECIMEN CONTAINER STERILE PEEL PACK

## (undated) DEVICE — TACTIFLEX SE BI D CURVE F-J

## (undated) DEVICE — SUT VICRYL 2-0 SH 27 IN UNDYED J417H

## (undated) DEVICE — PACK TUR

## (undated) DEVICE — EXIDINE 4 PCT

## (undated) DEVICE — CATH URET .038 10FR 50CM DUAL LUMEN

## (undated) DEVICE — BETHLEHEM UNIVERSAL MINOR GEN: Brand: CARDINAL HEALTH

## (undated) DEVICE — CATH EP 7FR DI-DIR 10-POLE DEFL CS 2-8-2 FJ

## (undated) DEVICE — CATH EP 5FR QUAD SUPREME CRD

## (undated) DEVICE — SUT VICRYL 0 CT-1 36 IN J946H

## (undated) DEVICE — GLOVE INDICATOR PI UNDERGLOVE SZ 6.5 BLUE

## (undated) DEVICE — 2000CC GUARDIAN II: Brand: GUARDIAN

## (undated) DEVICE — GLOVE INDICATOR PI UNDERGLOVE SZ 8 BLUE

## (undated) DEVICE — GUIDEWIRE STRGHT TIP 0.035 IN  SOLO PLUS

## (undated) DEVICE — CATH EP ADVISOR HD GRID MAPPING 8F

## (undated) DEVICE — ENDOCUFF VISION MED BLUE ID 11.0

## (undated) DEVICE — INTENDED FOR TISSUE SEPARATION, AND OTHER PROCEDURES THAT REQUIRE A SHARP SURGICAL BLADE TO PUNCTURE OR CUT.: Brand: BARD-PARKER SAFETY BLADES SIZE 15, STERILE

## (undated) DEVICE — PLUMEPEN PRO 10FT

## (undated) DEVICE — ANTIBACTERIAL UNDYED BRAIDED (POLYGLACTIN 910), SYNTHETIC ABSORBABLE SUTURE: Brand: COATED VICRYL

## (undated) DEVICE — TUBING SET COOL POINT

## (undated) DEVICE — GLOVE SRG BIOGEL 7

## (undated) DEVICE — LAPAROTOMY DRAPE WITH POUCHES: Brand: CONVERTORS

## (undated) DEVICE — INTRO SHEATH 8FR 24CM ARROW-FLEX

## (undated) DEVICE — CHLORAPREP HI-LITE 26ML ORANGE

## (undated) DEVICE — SINGLE-USE DIGITAL FLEXIBLE URETEROSCOPE: Brand: APTRA

## (undated) DEVICE — DRAPE EQUIPMENT RF WAND